# Patient Record
Sex: MALE | Race: WHITE | Employment: OTHER | URBAN - METROPOLITAN AREA
[De-identification: names, ages, dates, MRNs, and addresses within clinical notes are randomized per-mention and may not be internally consistent; named-entity substitution may affect disease eponyms.]

---

## 2017-02-26 ENCOUNTER — HOSPITAL ENCOUNTER (OUTPATIENT)
Dept: SLEEP CENTER | Age: 42
Discharge: HOME | End: 2017-02-26
Payer: MEDICAID

## 2017-02-26 PROCEDURE — 95810 POLYSOM 6/> YRS 4/> PARAM: CPT

## 2017-03-19 ENCOUNTER — HOSPITAL ENCOUNTER (OUTPATIENT)
Dept: SLEEP CENTER | Age: 42
Discharge: HOME | End: 2017-03-19
Payer: MEDICAID

## 2017-03-19 PROCEDURE — 95811 POLYSOM 6/>YRS CPAP 4/> PARM: CPT

## 2017-06-10 ENCOUNTER — HOSPITAL ENCOUNTER (EMERGENCY)
Age: 42
Discharge: HOME | End: 2017-06-10
Attending: EMERGENCY MEDICINE
Payer: MEDICAID

## 2017-06-10 ENCOUNTER — APPOINTMENT (OUTPATIENT)
Dept: GENERAL RADIOLOGY | Age: 42
End: 2017-06-10
Payer: MEDICAID

## 2017-06-10 VITALS
SYSTOLIC BLOOD PRESSURE: 113 MMHG | DIASTOLIC BLOOD PRESSURE: 78 MMHG | HEIGHT: 71 IN | BODY MASS INDEX: 33.6 KG/M2 | WEIGHT: 240 LBS | HEART RATE: 80 BPM | OXYGEN SATURATION: 96 % | TEMPERATURE: 98.3 F | RESPIRATION RATE: 18 BRPM

## 2017-06-10 DIAGNOSIS — J01.90 ACUTE NON-RECURRENT SINUSITIS, UNSPECIFIED LOCATION: ICD-10-CM

## 2017-06-10 DIAGNOSIS — J20.9 ACUTE BRONCHITIS, UNSPECIFIED ORGANISM: Primary | ICD-10-CM

## 2017-06-10 PROCEDURE — 94640 AIRWAY INHALATION TREATMENT: CPT

## 2017-06-10 PROCEDURE — 6360000002 HC RX W HCPCS: Performed by: EMERGENCY MEDICINE

## 2017-06-10 PROCEDURE — 71020 XR CHEST STANDARD TWO VW: CPT

## 2017-06-10 PROCEDURE — 99283 EMERGENCY DEPT VISIT LOW MDM: CPT

## 2017-06-10 PROCEDURE — 96374 THER/PROPH/DIAG INJ IV PUSH: CPT

## 2017-06-10 RX ORDER — METHYLPREDNISOLONE 4 MG/1
TABLET ORAL
Qty: 1 KIT | Refills: 0 | Status: SHIPPED | OUTPATIENT
Start: 2017-06-10 | End: 2017-06-16

## 2017-06-10 RX ORDER — IPRATROPIUM BROMIDE AND ALBUTEROL SULFATE 2.5; .5 MG/3ML; MG/3ML
1 SOLUTION RESPIRATORY (INHALATION)
Status: DISCONTINUED | OUTPATIENT
Start: 2017-06-11 | End: 2017-06-11 | Stop reason: HOSPADM

## 2017-06-10 RX ORDER — ALBUTEROL SULFATE 2.5 MG/3ML
2.5 SOLUTION RESPIRATORY (INHALATION) ONCE
Status: DISCONTINUED | OUTPATIENT
Start: 2017-06-10 | End: 2017-06-10

## 2017-06-10 RX ORDER — METHYLPREDNISOLONE SODIUM SUCCINATE 125 MG/2ML
125 INJECTION, POWDER, LYOPHILIZED, FOR SOLUTION INTRAMUSCULAR; INTRAVENOUS ONCE
Status: COMPLETED | OUTPATIENT
Start: 2017-06-10 | End: 2017-06-10

## 2017-06-10 RX ORDER — LEVOFLOXACIN 750 MG/1
750 TABLET ORAL DAILY
Qty: 7 TABLET | Refills: 0 | Status: SHIPPED | OUTPATIENT
Start: 2017-06-10 | End: 2017-06-17

## 2017-06-10 RX ORDER — GUAIFENESIN AND PSEUDOEPHEDRINE HCL 1200; 120 MG/1; MG/1
1 TABLET, EXTENDED RELEASE ORAL 2 TIMES DAILY
Qty: 20 TABLET | Refills: 0 | Status: SHIPPED | OUTPATIENT
Start: 2017-06-10 | End: 2019-11-25

## 2017-06-10 RX ADMIN — ALBUTEROL SULFATE 2.5 MG: 2.5 SOLUTION RESPIRATORY (INHALATION) at 21:55

## 2017-06-10 RX ADMIN — METHYLPREDNISOLONE SODIUM SUCCINATE 125 MG: 125 INJECTION, POWDER, FOR SOLUTION INTRAMUSCULAR; INTRAVENOUS at 21:54

## 2017-06-10 ASSESSMENT — PAIN DESCRIPTION - FREQUENCY: FREQUENCY: CONTINUOUS

## 2017-06-10 ASSESSMENT — ENCOUNTER SYMPTOMS
RHINORRHEA: 0
CONSTIPATION: 0
APNEA: 0
SHORTNESS OF BREATH: 0
ABDOMINAL DISTENTION: 0
ABDOMINAL PAIN: 0
NAUSEA: 0
PHOTOPHOBIA: 0
DIARRHEA: 0
EYE PAIN: 0
COLOR CHANGE: 0
SORE THROAT: 0
VOMITING: 0
BACK PAIN: 0
COUGH: 1
WHEEZING: 0
SINUS PRESSURE: 1

## 2017-06-10 ASSESSMENT — PAIN SCALES - GENERAL: PAINLEVEL_OUTOF10: 7

## 2017-06-10 ASSESSMENT — PAIN DESCRIPTION - ORIENTATION: ORIENTATION: MID

## 2017-06-10 ASSESSMENT — PAIN DESCRIPTION - DESCRIPTORS: DESCRIPTORS: DULL;THROBBING

## 2017-06-10 ASSESSMENT — PAIN DESCRIPTION - DIRECTION: RADIATING_TOWARDS: THROAT

## 2017-06-10 ASSESSMENT — PAIN DESCRIPTION - LOCATION: LOCATION: CHEST

## 2017-06-10 ASSESSMENT — PAIN DESCRIPTION - PAIN TYPE: TYPE: ACUTE PAIN

## 2017-07-06 ENCOUNTER — APPOINTMENT (OUTPATIENT)
Dept: CT IMAGING | Age: 42
End: 2017-07-06
Payer: MEDICAID

## 2017-07-06 ENCOUNTER — HOSPITAL ENCOUNTER (EMERGENCY)
Age: 42
Discharge: HOME | End: 2017-07-06
Attending: EMERGENCY MEDICINE
Payer: MEDICAID

## 2017-07-06 VITALS
OXYGEN SATURATION: 96 % | DIASTOLIC BLOOD PRESSURE: 70 MMHG | HEIGHT: 71 IN | SYSTOLIC BLOOD PRESSURE: 111 MMHG | RESPIRATION RATE: 16 BRPM | HEART RATE: 57 BPM | TEMPERATURE: 97.7 F

## 2017-07-06 DIAGNOSIS — F12.90 CANNABINOID HYPEREMESIS SYNDROME: ICD-10-CM

## 2017-07-06 DIAGNOSIS — R10.84 GENERALIZED ABDOMINAL PAIN: Primary | ICD-10-CM

## 2017-07-06 DIAGNOSIS — R11.2 CANNABINOID HYPEREMESIS SYNDROME: ICD-10-CM

## 2017-07-06 LAB
ALBUMIN SERPL-MCNC: 5.1 G/DL (ref 3.9–4.9)
ALP BLD-CCNC: 138 U/L (ref 35–104)
ALT SERPL-CCNC: 73 U/L (ref 0–41)
ANION GAP SERPL CALCULATED.3IONS-SCNC: 30 MEQ/L (ref 7–13)
AST SERPL-CCNC: 46 U/L (ref 0–40)
BASOPHILS ABSOLUTE: 0.1 K/UL (ref 0–0.2)
BASOPHILS RELATIVE PERCENT: 0.8 %
BILIRUB SERPL-MCNC: 1.2 MG/DL (ref 0–1.2)
BUN BLDV-MCNC: 20 MG/DL (ref 6–20)
CALCIUM SERPL-MCNC: 10.6 MG/DL (ref 8.6–10.2)
CHLORIDE BLD-SCNC: 99 MEQ/L (ref 98–107)
CO2: 14 MEQ/L (ref 22–29)
CREAT SERPL-MCNC: 1.06 MG/DL (ref 0.7–1.2)
EOSINOPHILS ABSOLUTE: 0.2 K/UL (ref 0–0.7)
EOSINOPHILS RELATIVE PERCENT: 1.2 %
GFR AFRICAN AMERICAN: >60
GFR NON-AFRICAN AMERICAN: >60
GLOBULIN: 2.9 G/DL (ref 2.3–3.5)
GLUCOSE BLD-MCNC: 177 MG/DL (ref 74–109)
HCT VFR BLD CALC: 45.7 % (ref 42–52)
HEMOGLOBIN: 16.1 G/DL (ref 14–18)
LACTIC ACID: 4 MMOL/L (ref 0.5–2.2)
LIPASE: 33 U/L (ref 13–60)
LYMPHOCYTES ABSOLUTE: 2.2 K/UL (ref 1–4.8)
LYMPHOCYTES RELATIVE PERCENT: 18 %
MCH RBC QN AUTO: 31.2 PG (ref 27–31.3)
MCHC RBC AUTO-ENTMCNC: 35.3 % (ref 33–37)
MCV RBC AUTO: 88.4 FL (ref 80–100)
MONOCYTES ABSOLUTE: 0.7 K/UL (ref 0.2–0.8)
MONOCYTES RELATIVE PERCENT: 5.7 %
NEUTROPHILS ABSOLUTE: 9.1 K/UL (ref 1.4–6.5)
NEUTROPHILS RELATIVE PERCENT: 74.3 %
PDW BLD-RTO: 13.5 % (ref 11.5–14.5)
PLATELET # BLD: 359 K/UL (ref 130–400)
POTASSIUM SERPL-SCNC: 3.8 MEQ/L (ref 3.5–5.1)
RBC # BLD: 5.17 M/UL (ref 4.7–6.1)
SODIUM BLD-SCNC: 143 MEQ/L (ref 132–144)
TOTAL PROTEIN: 8 G/DL (ref 6.4–8.1)
WBC # BLD: 12.3 K/UL (ref 4.8–10.8)

## 2017-07-06 PROCEDURE — 2580000003 HC RX 258: Performed by: EMERGENCY MEDICINE

## 2017-07-06 PROCEDURE — 96374 THER/PROPH/DIAG INJ IV PUSH: CPT

## 2017-07-06 PROCEDURE — 96375 TX/PRO/DX INJ NEW DRUG ADDON: CPT

## 2017-07-06 PROCEDURE — 36415 COLL VENOUS BLD VENIPUNCTURE: CPT

## 2017-07-06 PROCEDURE — 99284 EMERGENCY DEPT VISIT MOD MDM: CPT

## 2017-07-06 PROCEDURE — 83605 ASSAY OF LACTIC ACID: CPT

## 2017-07-06 PROCEDURE — 85025 COMPLETE CBC W/AUTO DIFF WBC: CPT

## 2017-07-06 PROCEDURE — 74176 CT ABD & PELVIS W/O CONTRAST: CPT

## 2017-07-06 PROCEDURE — 83690 ASSAY OF LIPASE: CPT

## 2017-07-06 PROCEDURE — 6360000002 HC RX W HCPCS: Performed by: EMERGENCY MEDICINE

## 2017-07-06 PROCEDURE — 80053 COMPREHEN METABOLIC PANEL: CPT

## 2017-07-06 RX ORDER — MORPHINE SULFATE 4 MG/ML
4 INJECTION, SOLUTION INTRAMUSCULAR; INTRAVENOUS ONCE
Status: COMPLETED | OUTPATIENT
Start: 2017-07-06 | End: 2017-07-06

## 2017-07-06 RX ORDER — 0.9 % SODIUM CHLORIDE 0.9 %
1000 INTRAVENOUS SOLUTION INTRAVENOUS ONCE
Status: COMPLETED | OUTPATIENT
Start: 2017-07-06 | End: 2017-07-06

## 2017-07-06 RX ORDER — SODIUM CHLORIDE 9 MG/ML
INJECTION, SOLUTION INTRAVENOUS CONTINUOUS
Status: DISCONTINUED | OUTPATIENT
Start: 2017-07-06 | End: 2017-07-06 | Stop reason: HOSPADM

## 2017-07-06 RX ORDER — ONDANSETRON 2 MG/ML
4 INJECTION INTRAMUSCULAR; INTRAVENOUS ONCE
Status: COMPLETED | OUTPATIENT
Start: 2017-07-06 | End: 2017-07-06

## 2017-07-06 RX ORDER — MORPHINE SULFATE 4 MG/ML
4 INJECTION, SOLUTION INTRAMUSCULAR; INTRAVENOUS
Status: DISCONTINUED | OUTPATIENT
Start: 2017-07-06 | End: 2017-07-06 | Stop reason: HOSPADM

## 2017-07-06 RX ADMIN — SODIUM CHLORIDE 1000 ML: 9 INJECTION, SOLUTION INTRAVENOUS at 14:21

## 2017-07-06 RX ADMIN — MORPHINE SULFATE 4 MG: 4 INJECTION, SOLUTION INTRAMUSCULAR; INTRAVENOUS at 14:21

## 2017-07-06 RX ADMIN — ONDANSETRON 4 MG: 2 INJECTION INTRAMUSCULAR; INTRAVENOUS at 14:21

## 2017-07-06 RX ADMIN — MORPHINE SULFATE 4 MG: 4 INJECTION, SOLUTION INTRAMUSCULAR; INTRAVENOUS at 14:38

## 2017-07-06 ASSESSMENT — ENCOUNTER SYMPTOMS
RHINORRHEA: 0
CHEST TIGHTNESS: 0
DIARRHEA: 0
BLOOD IN STOOL: 0
ABDOMINAL PAIN: 1
VOMITING: 1
NAUSEA: 1
BACK PAIN: 0
TROUBLE SWALLOWING: 0
COUGH: 0
WHEEZING: 0
EYE PAIN: 0
SHORTNESS OF BREATH: 0

## 2017-07-06 ASSESSMENT — PAIN SCALES - GENERAL
PAINLEVEL_OUTOF10: 10
PAINLEVEL_OUTOF10: 7
PAINLEVEL_OUTOF10: 10

## 2017-07-06 ASSESSMENT — PAIN DESCRIPTION - PAIN TYPE: TYPE: ACUTE PAIN

## 2017-07-06 ASSESSMENT — PAIN DESCRIPTION - LOCATION: LOCATION: ABDOMEN

## 2018-01-18 ENCOUNTER — HOSPITAL ENCOUNTER (EMERGENCY)
Age: 43
Discharge: HOME | End: 2018-01-18
Attending: EMERGENCY MEDICINE
Payer: MEDICAID

## 2018-01-18 VITALS
HEART RATE: 72 BPM | SYSTOLIC BLOOD PRESSURE: 138 MMHG | TEMPERATURE: 97.8 F | DIASTOLIC BLOOD PRESSURE: 82 MMHG | RESPIRATION RATE: 17 BRPM | OXYGEN SATURATION: 94 % | WEIGHT: 235 LBS | BODY MASS INDEX: 32.9 KG/M2 | HEIGHT: 71 IN

## 2018-01-18 DIAGNOSIS — S16.1XXA ACUTE STRAIN OF NECK MUSCLE, INITIAL ENCOUNTER: Primary | ICD-10-CM

## 2018-01-18 DIAGNOSIS — G44.209 TENSION HEADACHE: ICD-10-CM

## 2018-01-18 PROCEDURE — 96372 THER/PROPH/DIAG INJ SC/IM: CPT

## 2018-01-18 PROCEDURE — 6360000002 HC RX W HCPCS: Performed by: EMERGENCY MEDICINE

## 2018-01-18 PROCEDURE — 99283 EMERGENCY DEPT VISIT LOW MDM: CPT

## 2018-01-18 RX ORDER — KETOROLAC TROMETHAMINE 30 MG/ML
30 INJECTION, SOLUTION INTRAMUSCULAR; INTRAVENOUS ONCE
Status: COMPLETED | OUTPATIENT
Start: 2018-01-18 | End: 2018-01-18

## 2018-01-18 RX ORDER — NAPROXEN 500 MG/1
500 TABLET ORAL 2 TIMES DAILY
Qty: 10 TABLET | Refills: 0 | Status: SHIPPED | OUTPATIENT
Start: 2018-01-18 | End: 2019-11-25

## 2018-01-18 RX ORDER — DIAZEPAM 5 MG/1
5 TABLET ORAL 2 TIMES DAILY PRN
Qty: 10 TABLET | Refills: 0 | Status: SHIPPED | OUTPATIENT
Start: 2018-01-18 | End: 2018-01-28

## 2018-01-18 RX ADMIN — KETOROLAC TROMETHAMINE 30 MG: 30 INJECTION, SOLUTION INTRAMUSCULAR at 20:10

## 2018-01-18 ASSESSMENT — ENCOUNTER SYMPTOMS
VOMITING: 0
SHORTNESS OF BREATH: 0
COUGH: 0

## 2018-01-18 ASSESSMENT — PAIN DESCRIPTION - LOCATION
LOCATION: NECK
LOCATION: NECK;HEAD

## 2018-01-18 ASSESSMENT — PAIN SCALES - GENERAL
PAINLEVEL_OUTOF10: 1
PAINLEVEL_OUTOF10: 7
PAINLEVEL_OUTOF10: 7

## 2018-01-18 ASSESSMENT — PAIN DESCRIPTION - DESCRIPTORS
DESCRIPTORS: ACHING;BURNING;THROBBING
DESCRIPTORS: ACHING
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
## 2018-02-14 ENCOUNTER — APPOINTMENT (OUTPATIENT)
Dept: GENERAL RADIOLOGY | Age: 43
End: 2018-02-14
Payer: MEDICAID

## 2018-02-14 ENCOUNTER — HOSPITAL ENCOUNTER (EMERGENCY)
Age: 43
Discharge: HOME | End: 2018-02-14
Attending: EMERGENCY MEDICINE
Payer: MEDICAID

## 2018-02-14 VITALS
RESPIRATION RATE: 16 BRPM | SYSTOLIC BLOOD PRESSURE: 134 MMHG | BODY MASS INDEX: 32.2 KG/M2 | DIASTOLIC BLOOD PRESSURE: 74 MMHG | HEART RATE: 88 BPM | TEMPERATURE: 98.6 F | HEIGHT: 71 IN | WEIGHT: 230 LBS | OXYGEN SATURATION: 95 %

## 2018-02-14 DIAGNOSIS — R05.9 COUGH: Primary | ICD-10-CM

## 2018-02-14 DIAGNOSIS — R09.81 NASAL CONGESTION: ICD-10-CM

## 2018-02-14 DIAGNOSIS — J40 BRONCHITIS: ICD-10-CM

## 2018-02-14 LAB
RAPID INFLUENZA  B AGN: NEGATIVE
RAPID INFLUENZA A AGN: NEGATIVE

## 2018-02-14 PROCEDURE — 99283 EMERGENCY DEPT VISIT LOW MDM: CPT

## 2018-02-14 PROCEDURE — 71046 X-RAY EXAM CHEST 2 VIEWS: CPT

## 2018-02-14 PROCEDURE — 86403 PARTICLE AGGLUT ANTBDY SCRN: CPT

## 2018-02-14 RX ORDER — BENZONATATE 100 MG/1
100 CAPSULE ORAL 3 TIMES DAILY PRN
Qty: 20 CAPSULE | Refills: 0 | Status: SHIPPED | OUTPATIENT
Start: 2018-02-14 | End: 2019-11-25

## 2018-02-14 RX ORDER — AZITHROMYCIN 250 MG/1
TABLET, FILM COATED ORAL
Qty: 6 TABLET | Refills: 0 | Status: SHIPPED | OUTPATIENT
Start: 2018-02-14 | End: 2018-02-24

## 2018-02-14 ASSESSMENT — ENCOUNTER SYMPTOMS
STRIDOR: 0
FACIAL SWELLING: 0
VOICE CHANGE: 0
BACK PAIN: 0
CHOKING: 0
CONSTIPATION: 0
RHINORRHEA: 1
EYE PAIN: 0
TROUBLE SWALLOWING: 0
COUGH: 1
BLOOD IN STOOL: 0
SORE THROAT: 0
VOMITING: 0
DIARRHEA: 0
SINUS PRESSURE: 1
SINUS PAIN: 1
EYE DISCHARGE: 0
EYE REDNESS: 0
WHEEZING: 0
ABDOMINAL PAIN: 0
CHEST TIGHTNESS: 0
SHORTNESS OF BREATH: 0

## 2018-02-14 ASSESSMENT — PAIN DESCRIPTION - FREQUENCY: FREQUENCY: INTERMITTENT

## 2018-02-14 ASSESSMENT — PAIN DESCRIPTION - DESCRIPTORS: DESCRIPTORS: ACHING

## 2018-02-14 ASSESSMENT — PAIN DESCRIPTION - LOCATION: LOCATION: THROAT;CHEST

## 2018-02-14 ASSESSMENT — PAIN SCALES - GENERAL: PAINLEVEL_OUTOF10: 8

## 2018-02-14 ASSESSMENT — PAIN DESCRIPTION - PAIN TYPE: TYPE: ACUTE PAIN

## 2018-02-14 NOTE — ED PROVIDER NOTES
Neck: Neck supple. No tracheal deviation present. No thyromegaly present. Cardiovascular: Normal rate, regular rhythm and normal heart sounds. Exam reveals no gallop and no friction rub. No murmur heard. Pulmonary/Chest: Breath sounds normal. No respiratory distress. He has no wheezes. He has no rales. Abdominal: Soft. Bowel sounds are normal. He exhibits no mass. There is no rebound. Musculoskeletal: Normal range of motion. He exhibits no edema or tenderness. Neurological: He is alert and oriented to person, place, and time. No cranial nerve deficit. He exhibits normal muscle tone. Skin: Skin is warm. No rash noted. No erythema. Psychiatric: His behavior is normal. Thought content normal.       DIAGNOSTIC RESULTS     EKG: All EKG's are interpreted by the Emergency Department Physician who either signs or Co-signs this chart in the absence of a cardiologist.        RADIOLOGY:   Non-plain film images such as CT, Ultrasound and MRI are read by the radiologist. Plain radiographic images are visualized and preliminarily interpreted by the emergency physician with the below findings:        Interpretation per the Radiologist below, if available at the time of this note:    XR CHEST STANDARD (2 VW)    (Results Pending)         ED BEDSIDE ULTRASOUND:   Performed by ED Physician - none    LABS:  Labs Reviewed   RAPID INFLUENZA A/B ANTIGENS       All other labs were within normal range or not returned as of this dictation. EMERGENCY DEPARTMENT COURSE and DIFFERENTIAL DIAGNOSIS/MDM:   Vitals:    Vitals:    02/14/18 1719   BP: (!) 125/92   Pulse: 88   Resp: 16   Temp: 98.6 °F (37 °C)   SpO2: 96%   Weight: 230 lb (104.3 kg)   Height: 5' 11\" (1.803 m)           MDM    CRITICAL CARE TIME   Total Critical Care time was  minutes, excluding separately reportable procedures.   There was a high probability of clinically significant/life threatening deterioration in the patient's condition which required my urgent

## 2018-02-14 NOTE — ED TRIAGE NOTES
Pt reporting generalized body aches x 3 days. Cough that is painful in chest and throat when coughing. Low grade fever at home.

## 2019-05-15 ENCOUNTER — HOSPITAL ENCOUNTER (EMERGENCY)
Age: 44
Discharge: HOME | End: 2019-05-15
Attending: EMERGENCY MEDICINE
Payer: MEDICAID

## 2019-05-15 VITALS
SYSTOLIC BLOOD PRESSURE: 108 MMHG | HEIGHT: 71 IN | TEMPERATURE: 97.9 F | WEIGHT: 240 LBS | BODY MASS INDEX: 33.6 KG/M2 | OXYGEN SATURATION: 96 % | DIASTOLIC BLOOD PRESSURE: 74 MMHG | HEART RATE: 62 BPM | RESPIRATION RATE: 18 BRPM

## 2019-05-15 DIAGNOSIS — F41.1 ANXIETY STATE: Primary | ICD-10-CM

## 2019-05-15 LAB
ALBUMIN SERPL-MCNC: 5 G/DL (ref 3.5–4.6)
ALP BLD-CCNC: 147 U/L (ref 35–104)
ALT SERPL-CCNC: 126 U/L (ref 0–41)
ANION GAP SERPL CALCULATED.3IONS-SCNC: 17 MEQ/L (ref 9–15)
AST SERPL-CCNC: 82 U/L (ref 0–40)
BASOPHILS ABSOLUTE: 0.1 K/UL (ref 0–0.2)
BASOPHILS RELATIVE PERCENT: 0.7 %
BILIRUB SERPL-MCNC: 1.1 MG/DL (ref 0.2–0.7)
BUN BLDV-MCNC: 20 MG/DL (ref 6–20)
CALCIUM SERPL-MCNC: 10.7 MG/DL (ref 8.5–9.9)
CHLORIDE BLD-SCNC: 102 MEQ/L (ref 95–107)
CO2: 22 MEQ/L (ref 20–31)
CREAT SERPL-MCNC: 0.94 MG/DL (ref 0.7–1.2)
EOSINOPHILS ABSOLUTE: 0.3 K/UL (ref 0–0.7)
EOSINOPHILS RELATIVE PERCENT: 3.3 %
GFR AFRICAN AMERICAN: >60
GFR NON-AFRICAN AMERICAN: >60
GLOBULIN: 3.2 G/DL (ref 2.3–3.5)
GLUCOSE BLD-MCNC: 150 MG/DL (ref 70–99)
HCT VFR BLD CALC: 45.1 % (ref 42–52)
HEMOGLOBIN: 15.6 G/DL (ref 14–18)
LYMPHOCYTES ABSOLUTE: 2.2 K/UL (ref 1–4.8)
LYMPHOCYTES RELATIVE PERCENT: 26.8 %
MCH RBC QN AUTO: 30.7 PG (ref 27–31.3)
MCHC RBC AUTO-ENTMCNC: 34.7 % (ref 33–37)
MCV RBC AUTO: 88.5 FL (ref 80–100)
MONOCYTES ABSOLUTE: 0.5 K/UL (ref 0.2–0.8)
MONOCYTES RELATIVE PERCENT: 6.2 %
NEUTROPHILS ABSOLUTE: 5.3 K/UL (ref 1.4–6.5)
NEUTROPHILS RELATIVE PERCENT: 63 %
PDW BLD-RTO: 12.9 % (ref 11.5–14.5)
PLATELET # BLD: 344 K/UL (ref 130–400)
POTASSIUM SERPL-SCNC: 4.2 MEQ/L (ref 3.4–4.9)
RBC # BLD: 5.1 M/UL (ref 4.7–6.1)
SODIUM BLD-SCNC: 141 MEQ/L (ref 135–144)
TOTAL PROTEIN: 8.2 G/DL (ref 6.3–8)
WBC # BLD: 8.4 K/UL (ref 4.8–10.8)

## 2019-05-15 PROCEDURE — 85025 COMPLETE CBC W/AUTO DIFF WBC: CPT

## 2019-05-15 PROCEDURE — 6360000002 HC RX W HCPCS: Performed by: EMERGENCY MEDICINE

## 2019-05-15 PROCEDURE — 99283 EMERGENCY DEPT VISIT LOW MDM: CPT

## 2019-05-15 PROCEDURE — 96374 THER/PROPH/DIAG INJ IV PUSH: CPT

## 2019-05-15 PROCEDURE — 2580000003 HC RX 258: Performed by: EMERGENCY MEDICINE

## 2019-05-15 PROCEDURE — 36415 COLL VENOUS BLD VENIPUNCTURE: CPT

## 2019-05-15 PROCEDURE — 80053 COMPREHEN METABOLIC PANEL: CPT

## 2019-05-15 RX ORDER — LORAZEPAM 2 MG/ML
1 INJECTION INTRAMUSCULAR ONCE
Status: COMPLETED | OUTPATIENT
Start: 2019-05-15 | End: 2019-05-15

## 2019-05-15 RX ORDER — HYDROXYZINE PAMOATE 25 MG/1
50 CAPSULE ORAL 3 TIMES DAILY PRN
Qty: 30 CAPSULE | Refills: 0 | Status: SHIPPED | OUTPATIENT
Start: 2019-05-15 | End: 2022-05-12

## 2019-05-15 RX ORDER — 0.9 % SODIUM CHLORIDE 0.9 %
1000 INTRAVENOUS SOLUTION INTRAVENOUS ONCE
Status: COMPLETED | OUTPATIENT
Start: 2019-05-15 | End: 2019-05-15

## 2019-05-15 RX ADMIN — LORAZEPAM 1 MG: 2 INJECTION INTRAMUSCULAR; INTRAVENOUS at 14:21

## 2019-05-15 RX ADMIN — SODIUM CHLORIDE 1000 ML: 9 INJECTION, SOLUTION INTRAVENOUS at 14:21

## 2019-05-15 ASSESSMENT — ENCOUNTER SYMPTOMS
SINUS PRESSURE: 0
CONSTIPATION: 0
DIARRHEA: 0
VOMITING: 0
VOICE CHANGE: 0
WHEEZING: 0
COUGH: 0
CHOKING: 0
CHEST TIGHTNESS: 0
FACIAL SWELLING: 0
TROUBLE SWALLOWING: 0
EYE REDNESS: 0
SORE THROAT: 0
STRIDOR: 0
ABDOMINAL PAIN: 0
EYE DISCHARGE: 0
EYE PAIN: 0
BLOOD IN STOOL: 0
BACK PAIN: 0
SHORTNESS OF BREATH: 0

## 2019-05-15 NOTE — ED PROVIDER NOTES
[address] [place] [address] ED  eMERGENCY dEPARTMENT eNCOUnter      Pt Name: David Lui  MRN: [id number]  [place] 1975  Date of evaluation: 5/15/2019  Provider: Mary Lou Concepcion MD    [address] [place] [address]       Chief Complaint   Patient presents with    Anxiety       HISTORY OF PRESENT ILLNESS   (Location/Symptom, Timing/Onset,Context/Setting, Quality, Duration, Modifying Factors, Severity)  Note limiting factors. David Lui is a 53 y.o. male who presents to the emergency department patient with a history of axis alcohol abuse last alcohol was 3 years ago patient with history of anxiety and depression denies any suicidal homicidal ideation at this time but has some legal issues for which she is more anxious denies drinking any too much caffeine or taking any energy drink patient is smoker comes with the chief complaint unable to function for the last 2 days not able to eat or drink much because the shakiness and requesting something for calming down patient had no chest tightness no fever no chills no nausea no vomiting     HPI    NursingNotes were reviewed. REVIEW OF SYSTEMS    (2-9 systems for level 4, 10 or more for level 5)     Review of Systems   Constitutional: Positive for activity change and appetite change. Negative for fever. HENT: Negative for congestion, drooling, facial swelling, mouth sores, nosebleeds, sinus pressure, sore throat, trouble swallowing and voice change. Eyes: Negative for pain, discharge, redness and visual disturbance. Respiratory: Negative for cough, choking, chest tightness, shortness of breath, wheezing and stridor. Cardiovascular: Negative for chest pain, palpitations and leg swelling. Gastrointestinal: Negative for abdominal pain, blood in stool, constipation, diarrhea and vomiting. Endocrine: Negative for cold intolerance, polyphagia and polyuria. Genitourinary: Negative for dysuria, flank pain, frequency, genital sores and urgency.    Musculoskeletal: Negative for back pain, joint swelling, neck pain and neck stiffness. Skin: Negative for pallor and rash. Neurological: Negative for tremors, seizures, syncope, weakness, numbness and headaches. Hematological: Negative for adenopathy. Does not bruise/bleed easily. Psychiatric/Behavioral: Positive for decreased concentration. Negative for agitation, behavioral problems, hallucinations and sleep disturbance. The patient is nervous/anxious and is hyperactive. All other systems reviewed and are negative. Except as noted above the remainder of the review of systems was reviewed and negative. PAST MEDICAL HISTORY     Past Medical History:   Diagnosis Date    Depression     ETOH abuse     Sleep apnea          SURGICALHISTORY     History reviewed. No pertinent surgical history. CURRENT MEDICATIONS       Discharge Medication List as of 5/15/2019  3:05 PM      CONTINUE these medications which have NOT CHANGED    Details   benzonatate (TESSALON PERLES) 100 MG capsule Take 1 capsule by mouth 3 times daily as needed for Cough, Disp-20 capsule, R-0Print      naproxen (NAPROSYN) 500 MG tablet Take 1 tablet by mouth 2 times daily, Disp-10 tablet, R-0Print      Pseudoephedrine-guaiFENesin (MUCINEX D) 120-1200 MG TB12 Take 1 tablet by mouth 2 times daily, Disp-20 tablet, R-0Print             ALLERGIES     Patient has no known allergies. FAMILY HISTORY     History reviewed. No pertinent family history.        SOCIAL HISTORY       Social History     Socioeconomic History    Marital status:      Spouse name: None    Number of children: None    Years of education: None    Highest education level: None   Occupational History    None   Social Needs    Financial resource strain: None    Food insecurity:     Worry: None     Inability: None    Transportation needs:     Medical: None     Non-medical: None   Tobacco Use    Smoking status: Former Smoker     Types: Cigarettes     Last attempt to quit: 2010 Years since quittin.3    Smokeless tobacco: Never Used   Substance and Sexual Activity    Alcohol use: Not Currently     Comment: recovering ETOH    Drug use: No     Comment: Daily    Sexual activity: None   Lifestyle    Physical activity:     Days per week: None     Minutes per session: None    Stress: None   Relationships    Social connections:     Talks on phone: None     Gets together: None     Attends Christianity service: None     Active member of club or organization: None     Attends meetings of clubs or organizations: None     Relationship status: None    Intimate partner violence:     Fear of current or ex partner: None     Emotionally abused: None     Physically abused: None     Forced sexual activity: None   Other Topics Concern    None   Social History Narrative    None       SCREENINGS      @FLOW([id number])@      PHYSICAL EXAM    (up to 7 for level 4, 8 or more for level 5)     ED Triage Vitals [05/15/19 1400]   BP Temp Temp Source Pulse Resp SpO2 Height Weight   115/77 97.9 °F (36.6 °C) Oral 77 18 96 % 5' 11\" (1.803 m) 240 lb (108.9 kg)       Physical Exam   Constitutional: He is oriented to person, place, and time. He appears well-developed and well-nourished. Active alert cooperative patient but very anxious unable to stay still nontoxic afebrile   HENT:   Head: Normocephalic and atraumatic. Right Ear: External ear normal.   Left Ear: External ear normal.   Nose: Nose normal.   Mouth/Throat: Oropharynx is clear and moist.   Eyes: Pupils are equal, round, and reactive to light. Neck: Neck supple. Cardiovascular: Normal rate and normal heart sounds. Exam reveals no gallop. No murmur heard. Pulmonary/Chest: Breath sounds normal. No respiratory distress. He has no wheezes. Abdominal: Soft. Bowel sounds are normal. He exhibits no mass. There is no rebound. Musculoskeletal: Normal range of motion. He exhibits no edema or tenderness.    Neurological: He is alert and oriented to person, place, and time. No cranial nerve deficit. He exhibits normal muscle tone. Skin: Skin is warm. No rash noted. No erythema. Psychiatric: His behavior is normal. Thought content normal.   Patient does have good eye contact but patient is very anxious and slight hyperventilation unable to stay still   Vitals reviewed. DIAGNOSTIC RESULTS     EKG: All EKG's are interpreted by the Emergency Department Physician who either signs or Co-signsthis chart in the absence of a cardiologist.        RADIOLOGY:   Bobby Settle such as CT, Ultrasound and MRI are read by the radiologist. Barb Alarcon radiographic images are visualized and preliminarily interpreted by the emergency physician with the below findings:        Interpretation per the Radiologist below, if available at the time ofthis note:    No orders to display         ED BEDSIDE ULTRASOUND:   Performed by ED Physician - none    LABS:  Labs Reviewed   COMPREHENSIVE METABOLIC PANEL - Abnormal; Notable for the following components:       Result Value    Anion Gap 17 (*)     Glucose 150 (*)     Calcium 10.7 (*)     Total Protein 8.2 (*)     Alb 5.0 (*)     Total Bilirubin 1.1 (*)     Alkaline Phosphatase 147 (*)      (*)     AST 82 (*)     All other components within normal limits   CBC WITH AUTO DIFFERENTIAL       All other labs were within normal range or not returned as of this dictation.     EMERGENCY DEPARTMENT COURSE and DIFFERENTIAL DIAGNOSIS/MDM:   Vitals:    Vitals:    05/15/19 1400 05/15/19 1504   BP: 115/77 108/74   Pulse: 77 62   Resp: 18 18   Temp: 97.9 °F (36.6 °C)    TempSrc: Oral    SpO2: 96% 96%   Weight: 240 lb (108.9 kg)    Height: 5' 11\" (1.803 m)            MDM  Number of Diagnoses or Management Options  Anxiety state:   Diagnosis management comments: Patient is increase in anxiety attack for the last 2 days time unable to function very well not eating very well for the last 2 days patient is given hydration as well as patient given Ativan and resting comfortably at this time patient is going home with the Vistaril will be nonirritating for him as patient requested no narcotic for him at this time because of his history of ex-alcohol abuse but denies any alcohol at this time       Amount and/or Complexity of Data Reviewed  Clinical lab tests: ordered and reviewed        CRITICAL CARE TIME   Total Critical Care time was  minutes, excluding separately reportableprocedures. There was a high probability of clinicallysignificant/life threatening deterioration in the patient's condition which required my urgent intervention. CONSULTS:  None    PROCEDURES:  Unless otherwise noted below, none     Procedures    FINAL IMPRESSION      1.  Anxiety state          DISPOSITION/PLAN   DISPOSITION        PATIENT REFERRED TO:  [facility] [id number] [phone]      As needed      DISCHARGE MEDICATIONS:  Discharge Medication List as of 5/15/2019  3:05 PM      START taking these medications    Details   hydrOXYzine (VISTARIL) 25 MG capsule Take 2 capsules by mouth 3 times daily as needed for Anxiety, Disp-30 capsule, R-0Print                (Please note that portions of this note were completed with a voice recognition program.  Efforts were made to edit the dictations but occasionally words are mis-transcribed.)    Michele Kingston MD (electronically signed)  Attending Emergency Physician       Michele Kingston MD  05/15/19 0884

## 2019-05-15 NOTE — ED NOTES
Nursing Adult Assessment    General Appearance  [] Facial Expressions, extremities, & body posture are relaxed. [x] Exceptions:anxious, unable to sit still    Cognitive  [x] Alert, make eye contact when prompted. [x] Oriented to person, place, & situation. [] Exceptions:    Respiratory  [x] Unlabored breathing   [x] Speaks in clear and complete sentences   [x] Chest expansion is symmetrical with breaths   [x] Breath sounds are clear bilaterally. [] Exceptions:    Cardiovascular  [x] Regular apical heart sounds   [x] Peripheral pulses are palpable   [x] Capillary refill < 3 seconds in all extremities. [] Exceptions:    Abdomen  [x] Non-tender   [x] Non-distended   [x] Bowel sounds are present. [] Exceptions:    Skin  [x] Color appropriate for ethnicity   [x] No rash or discoloration present at the area(s) of complaint   [x] Warm and dry to touch. [] Exceptions:    Extremities  [x] Non-tender   [x] Normal range of motion   [x] Normal sensation   [x] Normal Appearance, No Edema.   [] Exceptions:      Ha Chaudhari RN  05/15/19 1429

## 2019-05-15 NOTE — ED NOTES
Patient resting in bed states he feels more relaxed. No signs or symptoms of pain or distress noted.      Carmine Duran RN  05/15/19 8873

## 2019-05-15 NOTE — ED TRIAGE NOTES
Patient complains of anxiety r/t personal legal situation. Complains of anxiety since Monday. He states he is a recovering alcoholic and does not want any narcotics. He states his anxiety was so bad today he couldn't get out of bed to take his daughter to school.

## 2019-06-08 ENCOUNTER — HOSPITAL ENCOUNTER (EMERGENCY)
Age: 44
Discharge: HOME | End: 2019-06-09
Attending: EMERGENCY MEDICINE
Payer: MEDICAID

## 2019-06-08 DIAGNOSIS — F10.20 CHRONIC ALCOHOLISM (HCC): Primary | ICD-10-CM

## 2019-06-08 PROCEDURE — 99284 EMERGENCY DEPT VISIT MOD MDM: CPT

## 2019-06-09 VITALS
DIASTOLIC BLOOD PRESSURE: 70 MMHG | BODY MASS INDEX: 31.5 KG/M2 | WEIGHT: 225 LBS | TEMPERATURE: 99.1 F | SYSTOLIC BLOOD PRESSURE: 126 MMHG | OXYGEN SATURATION: 95 % | HEART RATE: 96 BPM | HEIGHT: 71 IN | RESPIRATION RATE: 16 BRPM

## 2019-06-09 LAB
ALBUMIN SERPL-MCNC: 4.8 G/DL (ref 3.5–4.6)
ALP BLD-CCNC: 193 U/L (ref 35–104)
ALT SERPL-CCNC: 97 U/L (ref 0–41)
AMPHETAMINE SCREEN, URINE: NORMAL
ANION GAP SERPL CALCULATED.3IONS-SCNC: 26 MEQ/L (ref 9–15)
AST SERPL-CCNC: 74 U/L (ref 0–40)
BACTERIA: NORMAL /HPF
BARBITURATE SCREEN URINE: NORMAL
BASOPHILS ABSOLUTE: 0.1 K/UL (ref 0–0.2)
BASOPHILS RELATIVE PERCENT: 1 %
BENZODIAZEPINE SCREEN, URINE: NORMAL
BILIRUB SERPL-MCNC: 0.9 MG/DL (ref 0.2–0.7)
BILIRUBIN URINE: NEGATIVE
BLOOD, URINE: ABNORMAL
BUN BLDV-MCNC: 13 MG/DL (ref 6–20)
CALCIUM SERPL-MCNC: 9.5 MG/DL (ref 8.5–9.9)
CANNABINOID SCREEN URINE: NORMAL
CHLORIDE BLD-SCNC: 97 MEQ/L (ref 95–107)
CLARITY: CLEAR
CO2: 18 MEQ/L (ref 20–31)
COCAINE METABOLITE SCREEN URINE: NORMAL
COLOR: YELLOW
CREAT SERPL-MCNC: 0.84 MG/DL (ref 0.7–1.2)
EOSINOPHILS ABSOLUTE: 0.1 K/UL (ref 0–0.7)
EOSINOPHILS RELATIVE PERCENT: 0.7 %
ETHANOL PERCENT: 0.25 G/DL
ETHANOL: 284 MG/DL (ref 0–0.08)
GFR AFRICAN AMERICAN: >60
GFR NON-AFRICAN AMERICAN: >60
GLOBULIN: 3 G/DL (ref 2.3–3.5)
GLUCOSE BLD-MCNC: 175 MG/DL (ref 70–99)
GLUCOSE URINE: NEGATIVE MG/DL
HCT VFR BLD CALC: 47.1 % (ref 42–52)
HEMOGLOBIN: 16.2 G/DL (ref 14–18)
KETONES, URINE: 80 MG/DL
LEUKOCYTE ESTERASE, URINE: NEGATIVE
LYMPHOCYTES ABSOLUTE: 2.3 K/UL (ref 1–4.8)
LYMPHOCYTES RELATIVE PERCENT: 28.2 %
Lab: NORMAL
MAGNESIUM: 2.3 MG/DL (ref 1.7–2.4)
MCH RBC QN AUTO: 30.5 PG (ref 27–31.3)
MCHC RBC AUTO-ENTMCNC: 34.4 % (ref 33–37)
MCV RBC AUTO: 88.7 FL (ref 80–100)
MONOCYTES ABSOLUTE: 0.4 K/UL (ref 0.2–0.8)
MONOCYTES RELATIVE PERCENT: 5.4 %
NEUTROPHILS ABSOLUTE: 5.2 K/UL (ref 1.4–6.5)
NEUTROPHILS RELATIVE PERCENT: 64.7 %
NITRITE, URINE: NEGATIVE
OPIATE SCREEN URINE: NORMAL
PDW BLD-RTO: 13.1 % (ref 11.5–14.5)
PH UA: 5.5 (ref 5–9)
PHENCYCLIDINE SCREEN URINE: NORMAL
PLATELET # BLD: 379 K/UL (ref 130–400)
POTASSIUM SERPL-SCNC: 4.1 MEQ/L (ref 3.4–4.9)
PROTEIN UA: 100 MG/DL
RBC # BLD: 5.31 M/UL (ref 4.7–6.1)
RBC UA: NORMAL /HPF (ref 0–2)
SODIUM BLD-SCNC: 141 MEQ/L (ref 135–144)
SPECIFIC GRAVITY UA: >=1.03 (ref 1–1.03)
TOTAL PROTEIN: 7.8 G/DL (ref 6.3–8)
TRICYCLIC, URINE: NORMAL
URINE REFLEX TO CULTURE: YES
UROBILINOGEN, URINE: 0.2 E.U./DL
WBC # BLD: 8.1 K/UL (ref 4.8–10.8)
WBC UA: NORMAL /HPF (ref 0–5)

## 2019-06-09 PROCEDURE — 81001 URINALYSIS AUTO W/SCOPE: CPT

## 2019-06-09 PROCEDURE — 87086 URINE CULTURE/COLONY COUNT: CPT

## 2019-06-09 PROCEDURE — 36415 COLL VENOUS BLD VENIPUNCTURE: CPT

## 2019-06-09 PROCEDURE — 80306 DRUG TEST PRSMV INSTRMNT: CPT

## 2019-06-09 PROCEDURE — 85025 COMPLETE CBC W/AUTO DIFF WBC: CPT

## 2019-06-09 PROCEDURE — 6360000002 HC RX W HCPCS: Performed by: EMERGENCY MEDICINE

## 2019-06-09 PROCEDURE — 80053 COMPREHEN METABOLIC PANEL: CPT

## 2019-06-09 PROCEDURE — 2580000003 HC RX 258: Performed by: EMERGENCY MEDICINE

## 2019-06-09 PROCEDURE — 83735 ASSAY OF MAGNESIUM: CPT

## 2019-06-09 PROCEDURE — 96374 THER/PROPH/DIAG INJ IV PUSH: CPT

## 2019-06-09 PROCEDURE — G0480 DRUG TEST DEF 1-7 CLASSES: HCPCS

## 2019-06-09 RX ORDER — CHLORDIAZEPOXIDE HYDROCHLORIDE 25 MG/1
25 CAPSULE, GELATIN COATED ORAL 3 TIMES DAILY PRN
Qty: 12 CAPSULE | Refills: 3 | Status: SHIPPED | OUTPATIENT
Start: 2019-06-09 | End: 2019-06-13

## 2019-06-09 RX ORDER — FOLIC ACID 5 MG/ML
1 INJECTION, SOLUTION INTRAMUSCULAR; INTRAVENOUS; SUBCUTANEOUS DAILY
Status: DISCONTINUED | OUTPATIENT
Start: 2019-06-09 | End: 2019-06-09 | Stop reason: HOSPADM

## 2019-06-09 RX ORDER — CLOTRIMAZOLE 10 MG/1
10 LOZENGE ORAL; TOPICAL
Qty: 50 TABLET | Refills: 0 | Status: SHIPPED | OUTPATIENT
Start: 2019-06-09 | End: 2019-06-19

## 2019-06-09 RX ORDER — 0.9 % SODIUM CHLORIDE 0.9 %
1000 INTRAVENOUS SOLUTION INTRAVENOUS ONCE
Status: COMPLETED | OUTPATIENT
Start: 2019-06-09 | End: 2019-06-09

## 2019-06-09 RX ORDER — THIAMINE HYDROCHLORIDE 100 MG/ML
100 INJECTION, SOLUTION INTRAMUSCULAR; INTRAVENOUS DAILY
Status: DISCONTINUED | OUTPATIENT
Start: 2019-06-09 | End: 2019-06-09 | Stop reason: HOSPADM

## 2019-06-09 RX ORDER — ONDANSETRON 2 MG/ML
4 INJECTION INTRAMUSCULAR; INTRAVENOUS ONCE
Status: COMPLETED | OUTPATIENT
Start: 2019-06-09 | End: 2019-06-09

## 2019-06-09 RX ADMIN — SODIUM CHLORIDE 1000 ML: 9 INJECTION, SOLUTION INTRAVENOUS at 00:12

## 2019-06-09 RX ADMIN — ONDANSETRON 4 MG: 2 INJECTION INTRAMUSCULAR; INTRAVENOUS at 00:29

## 2019-06-09 RX ADMIN — SODIUM CHLORIDE 1000 ML: 9 INJECTION, SOLUTION INTRAVENOUS at 02:42

## 2019-06-09 ASSESSMENT — ENCOUNTER SYMPTOMS
ABDOMINAL PAIN: 0
CONSTIPATION: 0
TROUBLE SWALLOWING: 0
CHEST TIGHTNESS: 0
DIARRHEA: 0
SORE THROAT: 0
EYE REDNESS: 0
SINUS PRESSURE: 0
BACK PAIN: 0
CHOKING: 0
BLOOD IN STOOL: 0
VOMITING: 0
EYE PAIN: 0
FACIAL SWELLING: 0
EYE DISCHARGE: 0
WHEEZING: 0
COUGH: 0
VOICE CHANGE: 0
SHORTNESS OF BREATH: 0
STRIDOR: 0

## 2019-06-09 NOTE — ED PROVIDER NOTES
Neurological: Negative for tremors, seizures, syncope, weakness, numbness and headaches. Hematological: Negative for adenopathy. Does not bruise/bleed easily. Psychiatric/Behavioral: Negative for agitation, behavioral problems, hallucinations and sleep disturbance. The patient is not hyperactive. All other systems reviewed and are negative. Except as noted above the remainder of the review of systems was reviewed and negative. PAST MEDICAL HISTORY     Past Medical History:   Diagnosis Date    Depression     ETOH abuse     Sleep apnea          SURGICALHISTORY     History reviewed. No pertinent surgical history. CURRENT MEDICATIONS       Discharge Medication List as of 6/9/2019  1:40 AM      CONTINUE these medications which have NOT CHANGED    Details   hydrOXYzine (VISTARIL) 25 MG capsule Take 2 capsules by mouth 3 times daily as needed for Anxiety, Disp-30 capsule, R-0Print      benzonatate (TESSALON PERLES) 100 MG capsule Take 1 capsule by mouth 3 times daily as needed for Cough, Disp-20 capsule, R-0Print      naproxen (NAPROSYN) 500 MG tablet Take 1 tablet by mouth 2 times daily, Disp-10 tablet, R-0Print      Pseudoephedrine-guaiFENesin (MUCINEX D) 120-1200 MG TB12 Take 1 tablet by mouth 2 times daily, Disp-20 tablet, R-0Print             ALLERGIES     Patient has no known allergies. FAMILY HISTORY     History reviewed. No pertinent family history.        SOCIAL HISTORY       Social History     Socioeconomic History    Marital status:      Spouse name: None    Number of children: None    Years of education: None    Highest education level: None   Occupational History    None   Social Needs    Financial resource strain: None    Food insecurity:     Worry: None     Inability: None    Transportation needs:     Medical: None     Non-medical: None   Tobacco Use    Smoking status: Former Smoker     Types: Cigarettes     Last attempt to quit: 2010     Years since quitting: 9. 4    Smokeless tobacco: Never Used   Substance and Sexual Activity    Alcohol use: Yes     Comment: recovering ETOH    Drug use: No     Comment: Daily    Sexual activity: None   Lifestyle    Physical activity:     Days per week: None     Minutes per session: None    Stress: None   Relationships    Social connections:     Talks on phone: None     Gets together: None     Attends Sikh service: None     Active member of club or organization: None     Attends meetings of clubs or organizations: None     Relationship status: None    Intimate partner violence:     Fear of current or ex partner: None     Emotionally abused: None     Physically abused: None     Forced sexual activity: None   Other Topics Concern    None   Social History Narrative    None       SCREENINGS      @FLOW([id number])@      PHYSICAL EXAM    (up to 7 for level 4, 8 or more for level 5)     ED Triage Vitals [06/08/19 2356]   BP Temp Temp Source Pulse Resp SpO2 Height Weight   -- 99.1 °F (37.3 °C) Oral 109 14 96 % 5' 11\" (1.803 m) 225 lb (102.1 kg)       Physical Exam   Constitutional: He is oriented to person, place, and time. He appears well-developed. Alert and cooperative moving all the extremities very well   HENT:   Head: Normocephalic and atraumatic. Right Ear: External ear normal.   Left Ear: External ear normal.   Mouth/Throat: Oropharynx is clear and moist.   Eyes: Pupils are equal, round, and reactive to light. Neck: Neck supple. Cardiovascular: Normal rate and normal heart sounds. Exam reveals no gallop. No murmur heard. Pulmonary/Chest: Breath sounds normal. No respiratory distress. He has no wheezes. Abdominal: Soft. Bowel sounds are normal. He exhibits no mass. There is no rebound. Musculoskeletal: Normal range of motion. He exhibits no edema or tenderness. Neurological: He is alert and oriented to person, place, and time. No cranial nerve deficit. He exhibits normal muscle tone. Skin: Skin is warm.  No rash noted. No erythema. Psychiatric: His behavior is normal. Thought content normal.   Nursing note and vitals reviewed. DIAGNOSTIC RESULTS     EKG: All EKG's are interpreted by the Emergency Department Physician who either signs or Co-signsthis chart in the absence of a cardiologist.        RADIOLOGY:   Maebelle Sensing such as CT, Ultrasound and MRI are read by the radiologist. Xenia Miller radiographic images are visualized and preliminarily interpreted by the emergency physician with the below findings:        Interpretation per the Radiologist below, if available at the time ofthis note:    No orders to display         ED BEDSIDE ULTRASOUND:   Performed by ED Physician - none    LABS:  Labs Reviewed   COMPREHENSIVE METABOLIC PANEL - Abnormal; Notable for the following components:       Result Value    CO2 18 (*)     Anion Gap 26 (*)     Glucose 175 (*)     Alb 4.8 (*)     Total Bilirubin 0.9 (*)     Alkaline Phosphatase 193 (*)     ALT 97 (*)     AST 74 (*)     All other components within normal limits   URINE RT REFLEX TO CULTURE - Abnormal; Notable for the following components:    Protein,  (*)     All other components within normal limits   URINE CULTURE    Narrative:     ORDER#: [id number]                          ORDERED BY: Tracy Ferreira  SOURCE: Urine Clean Catch                  COLLECTED:  06/09/19 03:54  ANTIBIOTICS AT [facility].:                      RECEIVED :  06/09/19 15:55   CBC WITH AUTO DIFFERENTIAL   MAGNESIUM   URINE DRUG SCREEN, COMPREHENSIVE   ETHANOL   MICROSCOPIC URINALYSIS       All other labs were within normal range or not returned as of this dictation.     EMERGENCY DEPARTMENT COURSE and DIFFERENTIAL DIAGNOSIS/MDM:   Vitals:    Vitals:    06/08/19 2356 06/09/19 0530 06/09/19 0650   BP:  130/65 126/70   Pulse: 109 101 96   Resp: 14 16 16   Temp: 99.1 °F (37.3 °C)     TempSrc: Oral     SpO2: 96% 93% 95%   Weight: 225 lb (102.1 kg)     Height: 5' 11\" (1.803 m) MDM    CRITICAL CARE TIME   Total Critical Care time was  minutes, excluding separately reportableprocedures. There was a high probability of clinicallysignificant/life threatening deterioration in the patient's condition which required my urgent intervention. ONSULTS:  None    PROCEDURES:  Unless otherwise noted below, none     Procedures    FINAL IMPRESSION      1. Chronic alcoholism [facility])          DISPOSITION/PLAN   DISPOSITION Decision To Discharge 06/09/2019 01:34:12 AM      PATIENT REFERRED TO:  [facility]  [facility] [id number] [phone]    As needed      DISCHARGE MEDICATIONS:  Discharge Medication List as of 6/9/2019  1:40 AM      START taking these medications    Details   chlordiazePOXIDE (LIBRIUM) 25 MG capsule Take 1 capsule by mouth 3 times daily as needed for Anxiety for up to 4 days. , Disp-12 capsule, R-3Print                (Please note that portions of this note were completed with a voice recognition program.  Efforts were made to edit the dictations but occasionally words are mis-transcribed.)    Bridgette Richardson MD (electronically signed)  Attending Emergency Physician       Bridgette Richardson MD  06/09/19 Vinh Garcia MD  06/12/19 3243

## 2019-06-11 LAB — URINE CULTURE, ROUTINE: NORMAL

## 2019-08-09 DIAGNOSIS — F10.939 ALCOHOL WITHDRAWAL SYNDROME WITH COMPLICATION (HCC): Primary | ICD-10-CM

## 2019-08-09 RX ORDER — CHLORDIAZEPOXIDE HYDROCHLORIDE 25 MG/1
25 CAPSULE, GELATIN COATED ORAL DAILY
Qty: 7 CAPSULE | Refills: 0 | Status: SHIPPED | OUTPATIENT
Start: 2019-08-09 | End: 2019-08-16

## 2019-11-25 ENCOUNTER — HOSPITAL ENCOUNTER (EMERGENCY)
Age: 44
Discharge: HOME | End: 2019-11-25
Attending: EMERGENCY MEDICINE
Payer: MEDICAID

## 2019-11-25 VITALS
BODY MASS INDEX: 30.8 KG/M2 | RESPIRATION RATE: 18 BRPM | HEIGHT: 71 IN | WEIGHT: 220 LBS | TEMPERATURE: 98.3 F | SYSTOLIC BLOOD PRESSURE: 130 MMHG | OXYGEN SATURATION: 99 % | DIASTOLIC BLOOD PRESSURE: 84 MMHG | HEART RATE: 98 BPM

## 2019-11-25 DIAGNOSIS — J11.1 INFLUENZA-LIKE SYNDROME: Primary | ICD-10-CM

## 2019-11-25 PROCEDURE — 99282 EMERGENCY DEPT VISIT SF MDM: CPT

## 2019-11-25 PROCEDURE — 96372 THER/PROPH/DIAG INJ SC/IM: CPT

## 2019-11-25 PROCEDURE — 6360000002 HC RX W HCPCS: Performed by: EMERGENCY MEDICINE

## 2019-11-25 RX ORDER — ONDANSETRON 4 MG/1
4 TABLET, FILM COATED ORAL EVERY 8 HOURS PRN
Qty: 20 TABLET | Refills: 0 | Status: SHIPPED | OUTPATIENT
Start: 2019-11-25 | End: 2022-05-12

## 2019-11-25 RX ORDER — AZITHROMYCIN 250 MG/1
TABLET, FILM COATED ORAL
Qty: 1 PACKET | Refills: 0 | Status: ON HOLD | OUTPATIENT
Start: 2019-11-25 | End: 2020-02-11 | Stop reason: HOSPADM

## 2019-11-25 RX ORDER — DEXAMETHASONE SODIUM PHOSPHATE 10 MG/ML
6 INJECTION, SOLUTION INTRAMUSCULAR; INTRAVENOUS ONCE
Status: COMPLETED | OUTPATIENT
Start: 2019-11-25 | End: 2019-11-25

## 2019-11-25 RX ADMIN — DEXAMETHASONE SODIUM PHOSPHATE 6 MG: 10 INJECTION, SOLUTION INTRAMUSCULAR; INTRAVENOUS at 20:27

## 2019-11-25 ASSESSMENT — PAIN DESCRIPTION - PROGRESSION: CLINICAL_PROGRESSION: NOT CHANGED

## 2019-11-25 ASSESSMENT — PAIN DESCRIPTION - ORIENTATION
ORIENTATION: LEFT
ORIENTATION: POSTERIOR

## 2019-11-25 ASSESSMENT — PAIN DESCRIPTION - FREQUENCY
FREQUENCY: CONTINUOUS
FREQUENCY: CONTINUOUS

## 2019-11-25 ASSESSMENT — PAIN SCALES - GENERAL
PAINLEVEL_OUTOF10: 5
PAINLEVEL_OUTOF10: 6

## 2019-11-25 ASSESSMENT — PAIN DESCRIPTION - LOCATION
LOCATION: GENERALIZED
LOCATION: HEAD

## 2019-11-25 ASSESSMENT — PAIN DESCRIPTION - DESCRIPTORS: DESCRIPTORS: THROBBING

## 2019-11-25 ASSESSMENT — PAIN DESCRIPTION - PAIN TYPE
TYPE: ACUTE PAIN
TYPE: ACUTE PAIN

## 2019-11-25 ASSESSMENT — PAIN DESCRIPTION - ONSET: ONSET: ON-GOING

## 2019-11-25 ASSESSMENT — PAIN - FUNCTIONAL ASSESSMENT: PAIN_FUNCTIONAL_ASSESSMENT: 0-10

## 2020-02-10 ENCOUNTER — HOSPITAL ENCOUNTER (OUTPATIENT)
Age: 45
Setting detail: OBSERVATION
LOS: 1 days | Discharge: HOME | End: 2020-02-11
Attending: EMERGENCY MEDICINE | Admitting: INTERNAL MEDICINE
Payer: MEDICAID

## 2020-02-10 ENCOUNTER — APPOINTMENT (OUTPATIENT)
Dept: GENERAL RADIOLOGY | Age: 45
End: 2020-02-10
Payer: MEDICAID

## 2020-02-10 LAB
ALBUMIN SERPL-MCNC: 4.9 G/DL (ref 3.5–4.6)
ALP BLD-CCNC: 215 U/L (ref 35–104)
ALT SERPL-CCNC: 83 U/L (ref 0–41)
ANION GAP SERPL CALCULATED.3IONS-SCNC: 18 MEQ/L (ref 9–15)
AST SERPL-CCNC: 40 U/L (ref 0–40)
BASE EXCESS ARTERIAL: -2
BILIRUB SERPL-MCNC: 0.6 MG/DL (ref 0.2–0.7)
BILIRUBIN URINE: NEGATIVE
BLOOD, URINE: NEGATIVE
BUN BLDV-MCNC: 14 MG/DL (ref 6–20)
CALCIUM SERPL-MCNC: 10.7 MG/DL (ref 8.5–9.9)
CHLORIDE BLD-SCNC: 89 MEQ/L (ref 95–107)
CLARITY: CLEAR
CO2: 21 MEQ/L (ref 20–31)
COLOR: YELLOW
CREAT SERPL-MCNC: 0.89 MG/DL (ref 0.7–1.2)
GFR AFRICAN AMERICAN: >60
GFR NON-AFRICAN AMERICAN: >60
GLOBULIN: 2.7 G/DL (ref 2.3–3.5)
GLUCOSE BLD-MCNC: 704 MG/DL (ref 70–99)
GLUCOSE BLD-MCNC: >600 MG/DL (ref 60–115)
GLUCOSE URINE: 500 MG/DL
HCO3 ARTERIAL: 23 MMOL/L (ref 21–29)
HCT VFR BLD CALC: 42.4 % (ref 42–52)
HEMOGLOBIN: 14.7 G/DL (ref 14–18)
KETONES, URINE: NEGATIVE MG/DL
LACTATE: 2.43 MMOL/L (ref 0.4–2)
LACTIC ACID: 3 MMOL/L (ref 0.5–2.2)
LEUKOCYTE ESTERASE, URINE: NEGATIVE
LIPASE: 28 U/L (ref 12–95)
MCH RBC QN AUTO: 30.7 PG (ref 27–31.3)
MCHC RBC AUTO-ENTMCNC: 34.6 % (ref 33–37)
MCV RBC AUTO: 88.8 FL (ref 80–100)
NITRITE, URINE: NEGATIVE
O2 SAT, ARTERIAL: 97 % (ref 93–100)
PCO2 ARTERIAL: 36 MM HG (ref 35–45)
PDW BLD-RTO: 12.7 % (ref 11.5–14.5)
PERFORMED ON: ABNORMAL
PERFORMED ON: ABNORMAL
PH ARTERIAL: 7.42 (ref 7.35–7.45)
PH UA: 5.5 (ref 5–9)
PLATELET # BLD: 238 K/UL (ref 130–400)
PO2 ARTERIAL: 90 MM HG (ref 75–108)
POC SAMPLE TYPE: ABNORMAL
POTASSIUM SERPL-SCNC: 4.2 MEQ/L (ref 3.4–4.9)
PROTEIN UA: NEGATIVE MG/DL
RBC # BLD: 4.77 M/UL (ref 4.7–6.1)
SODIUM BLD-SCNC: 128 MEQ/L (ref 135–144)
SPECIFIC GRAVITY UA: 1.01 (ref 1–1.03)
TCO2 ARTERIAL: 24
TOTAL PROTEIN: 7.6 G/DL (ref 6.3–8)
URINE REFLEX TO CULTURE: ABNORMAL
UROBILINOGEN, URINE: 0.2 E.U./DL
WBC # BLD: 6 K/UL (ref 4.8–10.8)

## 2020-02-10 PROCEDURE — 96375 TX/PRO/DX INJ NEW DRUG ADDON: CPT

## 2020-02-10 PROCEDURE — 36600 WITHDRAWAL OF ARTERIAL BLOOD: CPT

## 2020-02-10 PROCEDURE — 71045 X-RAY EXAM CHEST 1 VIEW: CPT

## 2020-02-10 PROCEDURE — 36415 COLL VENOUS BLD VENIPUNCTURE: CPT

## 2020-02-10 PROCEDURE — 82803 BLOOD GASES ANY COMBINATION: CPT

## 2020-02-10 PROCEDURE — 96366 THER/PROPH/DIAG IV INF ADDON: CPT

## 2020-02-10 PROCEDURE — 2580000003 HC RX 258: Performed by: EMERGENCY MEDICINE

## 2020-02-10 PROCEDURE — 80053 COMPREHEN METABOLIC PANEL: CPT

## 2020-02-10 PROCEDURE — 81003 URINALYSIS AUTO W/O SCOPE: CPT

## 2020-02-10 PROCEDURE — 83690 ASSAY OF LIPASE: CPT

## 2020-02-10 PROCEDURE — 83605 ASSAY OF LACTIC ACID: CPT

## 2020-02-10 PROCEDURE — 82948 REAGENT STRIP/BLOOD GLUCOSE: CPT

## 2020-02-10 PROCEDURE — 85027 COMPLETE CBC AUTOMATED: CPT

## 2020-02-10 PROCEDURE — 96365 THER/PROPH/DIAG IV INF INIT: CPT

## 2020-02-10 PROCEDURE — 93005 ELECTROCARDIOGRAM TRACING: CPT

## 2020-02-10 PROCEDURE — 99285 EMERGENCY DEPT VISIT HI MDM: CPT

## 2020-02-10 PROCEDURE — 6370000000 HC RX 637 (ALT 250 FOR IP): Performed by: EMERGENCY MEDICINE

## 2020-02-10 RX ORDER — IBUPROFEN 400 MG/1
800 TABLET ORAL ONCE
Status: COMPLETED | OUTPATIENT
Start: 2020-02-10 | End: 2020-02-10

## 2020-02-10 RX ORDER — 0.9 % SODIUM CHLORIDE 0.9 %
1000 INTRAVENOUS SOLUTION INTRAVENOUS ONCE
Status: COMPLETED | OUTPATIENT
Start: 2020-02-10 | End: 2020-02-10

## 2020-02-10 RX ORDER — ONDANSETRON 2 MG/ML
4 INJECTION INTRAMUSCULAR; INTRAVENOUS ONCE
Status: DISCONTINUED | OUTPATIENT
Start: 2020-02-10 | End: 2020-02-11 | Stop reason: HOSPADM

## 2020-02-10 RX ORDER — 0.9 % SODIUM CHLORIDE 0.9 %
2500 INTRAVENOUS SOLUTION INTRAVENOUS ONCE
Status: COMPLETED | OUTPATIENT
Start: 2020-02-10 | End: 2020-02-11

## 2020-02-10 RX ORDER — SODIUM CHLORIDE 0.9 % (FLUSH) 0.9 %
3 SYRINGE (ML) INJECTION EVERY 8 HOURS
Status: DISCONTINUED | OUTPATIENT
Start: 2020-02-10 | End: 2020-02-11 | Stop reason: HOSPADM

## 2020-02-10 RX ORDER — SODIUM CHLORIDE 9 MG/ML
INJECTION, SOLUTION INTRAVENOUS
Status: DISCONTINUED
Start: 2020-02-10 | End: 2020-02-11 | Stop reason: HOSPADM

## 2020-02-10 RX ADMIN — SODIUM CHLORIDE 10 UNITS: 9 INJECTION, SOLUTION INTRAVENOUS at 22:57

## 2020-02-10 RX ADMIN — SODIUM CHLORIDE 1000 ML: 9 INJECTION, SOLUTION INTRAVENOUS at 22:31

## 2020-02-10 RX ADMIN — Medication 3 ML: at 22:45

## 2020-02-10 RX ADMIN — IBUPROFEN 800 MG: 400 TABLET, FILM COATED ORAL at 23:18

## 2020-02-10 RX ADMIN — SODIUM CHLORIDE 2500 ML: 9 INJECTION, SOLUTION INTRAVENOUS at 23:01

## 2020-02-10 RX ADMIN — INSULIN HUMAN 10 UNITS: 100 INJECTION, SOLUTION PARENTERAL at 22:57

## 2020-02-10 ASSESSMENT — PAIN DESCRIPTION - DESCRIPTORS: DESCRIPTORS: THROBBING

## 2020-02-10 ASSESSMENT — PAIN SCALES - GENERAL
PAINLEVEL_OUTOF10: 4
PAINLEVEL_OUTOF10: 4

## 2020-02-10 ASSESSMENT — PAIN DESCRIPTION - LOCATION: LOCATION: HEAD;FACE

## 2020-02-10 ASSESSMENT — PAIN DESCRIPTION - PAIN TYPE: TYPE: ACUTE PAIN

## 2020-02-10 ASSESSMENT — PAIN DESCRIPTION - FREQUENCY: FREQUENCY: INTERMITTENT

## 2020-02-10 NOTE — LETTER
[facility] Med Surg [facility]  [facility] [address]  [place] [address]  Phone: [phone]             February 11, 2020    Patient: Tahira Chacon   YOB: 1975   Date of Visit: 2/10/2020       To Whom It May Concern:    Tahira Chacon was seen and treated in our facility  beginning 2/10/2020 until 2/11/20  . He may return to work on February 14.       Sincerely,       Moriah Dillon MD         Signature:__________________________________

## 2020-02-11 VITALS
HEIGHT: 68 IN | WEIGHT: 234.44 LBS | BODY MASS INDEX: 35.53 KG/M2 | DIASTOLIC BLOOD PRESSURE: 98 MMHG | RESPIRATION RATE: 20 BRPM | OXYGEN SATURATION: 98 % | SYSTOLIC BLOOD PRESSURE: 150 MMHG | HEART RATE: 60 BPM | TEMPERATURE: 97.7 F

## 2020-02-11 PROBLEM — E13.10 SECONDARY DM WITH DKA (HCC): Status: ACTIVE | Noted: 2020-02-11

## 2020-02-11 LAB
ANION GAP SERPL CALCULATED.3IONS-SCNC: 14 MEQ/L (ref 9–15)
ANION GAP SERPL CALCULATED.3IONS-SCNC: 16 MEQ/L (ref 9–15)
BUN BLDV-MCNC: 13 MG/DL (ref 6–20)
BUN BLDV-MCNC: 13 MG/DL (ref 6–20)
CALCIUM SERPL-MCNC: 8.9 MG/DL (ref 8.5–9.9)
CALCIUM SERPL-MCNC: 9.1 MG/DL (ref 8.5–9.9)
CHLORIDE BLD-SCNC: 100 MEQ/L (ref 95–107)
CHLORIDE BLD-SCNC: 103 MEQ/L (ref 95–107)
CO2: 21 MEQ/L (ref 20–31)
CO2: 21 MEQ/L (ref 20–31)
CREAT SERPL-MCNC: 0.73 MG/DL (ref 0.7–1.2)
CREAT SERPL-MCNC: 0.75 MG/DL (ref 0.7–1.2)
EKG ATRIAL RATE: 58 BPM
EKG ATRIAL RATE: 88 BPM
EKG P AXIS: 10 DEGREES
EKG P AXIS: 29 DEGREES
EKG P-R INTERVAL: 168 MS
EKG P-R INTERVAL: 174 MS
EKG Q-T INTERVAL: 370 MS
EKG Q-T INTERVAL: 430 MS
EKG QRS DURATION: 104 MS
EKG QRS DURATION: 106 MS
EKG QTC CALCULATION (BAZETT): 422 MS
EKG QTC CALCULATION (BAZETT): 447 MS
EKG R AXIS: 12 DEGREES
EKG R AXIS: 12 DEGREES
EKG T AXIS: -3 DEGREES
EKG T AXIS: -5 DEGREES
EKG VENTRICULAR RATE: 58 BPM
EKG VENTRICULAR RATE: 88 BPM
GFR AFRICAN AMERICAN: >60
GFR AFRICAN AMERICAN: >60
GFR NON-AFRICAN AMERICAN: >60
GFR NON-AFRICAN AMERICAN: >60
GLUCOSE BLD-MCNC: 235 MG/DL (ref 60–115)
GLUCOSE BLD-MCNC: 249 MG/DL (ref 60–115)
GLUCOSE BLD-MCNC: 268 MG/DL (ref 70–99)
GLUCOSE BLD-MCNC: 274 MG/DL (ref 70–99)
GLUCOSE BLD-MCNC: 286 MG/DL (ref 60–115)
GLUCOSE BLD-MCNC: 393 MG/DL (ref 60–115)
HBA1C MFR BLD: 9.8 % (ref 4.8–5.9)
HCT VFR BLD CALC: 35.6 % (ref 42–52)
HEMOGLOBIN: 12.4 G/DL (ref 14–18)
MAGNESIUM: 2 MG/DL (ref 1.7–2.4)
MCH RBC QN AUTO: 30.9 PG (ref 27–31.3)
MCHC RBC AUTO-ENTMCNC: 34.7 % (ref 33–37)
MCV RBC AUTO: 89.1 FL (ref 80–100)
PDW BLD-RTO: 12.5 % (ref 11.5–14.5)
PERFORMED ON: ABNORMAL
PLATELET # BLD: 203 K/UL (ref 130–400)
POTASSIUM REFLEX MAGNESIUM: 3.9 MEQ/L (ref 3.4–4.9)
POTASSIUM SERPL-SCNC: 3.7 MEQ/L (ref 3.4–4.9)
RBC # BLD: 3.99 M/UL (ref 4.7–6.1)
SODIUM BLD-SCNC: 137 MEQ/L (ref 135–144)
SODIUM BLD-SCNC: 138 MEQ/L (ref 135–144)
TROPONIN: <0.01 NG/ML (ref 0–0.01)
TROPONIN: <0.01 NG/ML (ref 0–0.01)
WBC # BLD: 4.9 K/UL (ref 4.8–10.8)

## 2020-02-11 PROCEDURE — 85027 COMPLETE CBC AUTOMATED: CPT

## 2020-02-11 PROCEDURE — 83735 ASSAY OF MAGNESIUM: CPT

## 2020-02-11 PROCEDURE — 96372 THER/PROPH/DIAG INJ SC/IM: CPT

## 2020-02-11 PROCEDURE — 83036 HEMOGLOBIN GLYCOSYLATED A1C: CPT

## 2020-02-11 PROCEDURE — 84484 ASSAY OF TROPONIN QUANT: CPT

## 2020-02-11 PROCEDURE — 36415 COLL VENOUS BLD VENIPUNCTURE: CPT

## 2020-02-11 PROCEDURE — 93010 ELECTROCARDIOGRAM REPORT: CPT | Performed by: INTERNAL MEDICINE

## 2020-02-11 PROCEDURE — 93005 ELECTROCARDIOGRAM TRACING: CPT

## 2020-02-11 PROCEDURE — G0378 HOSPITAL OBSERVATION PER HR: HCPCS

## 2020-02-11 PROCEDURE — 80048 BASIC METABOLIC PNL TOTAL CA: CPT

## 2020-02-11 PROCEDURE — 6360000002 HC RX W HCPCS: Performed by: INTERNAL MEDICINE

## 2020-02-11 PROCEDURE — 6370000000 HC RX 637 (ALT 250 FOR IP): Performed by: INTERNAL MEDICINE

## 2020-02-11 RX ORDER — DEXTROSE MONOHYDRATE 25 G/50ML
12.5 INJECTION, SOLUTION INTRAVENOUS PRN
Status: DISCONTINUED | OUTPATIENT
Start: 2020-02-11 | End: 2020-02-11 | Stop reason: HOSPADM

## 2020-02-11 RX ORDER — SODIUM CHLORIDE 0.9 % (FLUSH) 0.9 %
10 SYRINGE (ML) INJECTION EVERY 12 HOURS SCHEDULED
Status: DISCONTINUED | OUTPATIENT
Start: 2020-02-11 | End: 2020-02-11 | Stop reason: HOSPADM

## 2020-02-11 RX ORDER — HYDROXYZINE HYDROCHLORIDE 25 MG/1
50 TABLET, FILM COATED ORAL 3 TIMES DAILY PRN
Status: DISCONTINUED | OUTPATIENT
Start: 2020-02-11 | End: 2020-02-11 | Stop reason: HOSPADM

## 2020-02-11 RX ORDER — NICOTINE POLACRILEX 4 MG
15 LOZENGE BUCCAL PRN
Status: DISCONTINUED | OUTPATIENT
Start: 2020-02-11 | End: 2020-02-11 | Stop reason: HOSPADM

## 2020-02-11 RX ORDER — GLUCOSAMINE HCL/CHONDROITIN SU 500-400 MG
1 CAPSULE ORAL DAILY
Qty: 5 EACH | Refills: 3 | Status: SHIPPED | OUTPATIENT
Start: 2020-02-11

## 2020-02-11 RX ORDER — INSULIN GLARGINE 100 [IU]/ML
25 INJECTION, SOLUTION SUBCUTANEOUS NIGHTLY
Status: DISCONTINUED | OUTPATIENT
Start: 2020-02-11 | End: 2020-02-11 | Stop reason: HOSPADM

## 2020-02-11 RX ORDER — DEXTROSE MONOHYDRATE 50 MG/ML
100 INJECTION, SOLUTION INTRAVENOUS PRN
Status: DISCONTINUED | OUTPATIENT
Start: 2020-02-11 | End: 2020-02-11 | Stop reason: HOSPADM

## 2020-02-11 RX ORDER — INSULIN GLARGINE 100 [IU]/ML
10 INJECTION, SOLUTION SUBCUTANEOUS ONCE
Status: COMPLETED | OUTPATIENT
Start: 2020-02-11 | End: 2020-02-11

## 2020-02-11 RX ORDER — SODIUM CHLORIDE 0.9 % (FLUSH) 0.9 %
10 SYRINGE (ML) INJECTION PRN
Status: DISCONTINUED | OUTPATIENT
Start: 2020-02-11 | End: 2020-02-11 | Stop reason: HOSPADM

## 2020-02-11 RX ORDER — ACETAMINOPHEN 325 MG/1
650 TABLET ORAL EVERY 6 HOURS PRN
Status: DISCONTINUED | OUTPATIENT
Start: 2020-02-11 | End: 2020-02-11 | Stop reason: HOSPADM

## 2020-02-11 RX ORDER — ONDANSETRON 2 MG/ML
4 INJECTION INTRAMUSCULAR; INTRAVENOUS EVERY 6 HOURS PRN
Status: DISCONTINUED | OUTPATIENT
Start: 2020-02-11 | End: 2020-02-11 | Stop reason: HOSPADM

## 2020-02-11 RX ORDER — SODIUM CHLORIDE AND POTASSIUM CHLORIDE .9; .15 G/100ML; G/100ML
SOLUTION INTRAVENOUS CONTINUOUS
Status: DISCONTINUED | OUTPATIENT
Start: 2020-02-11 | End: 2020-02-11 | Stop reason: HOSPADM

## 2020-02-11 RX ADMIN — INSULIN GLARGINE 25 UNITS: 100 INJECTION, SOLUTION SUBCUTANEOUS at 01:20

## 2020-02-11 RX ADMIN — POTASSIUM CHLORIDE AND SODIUM CHLORIDE: 900; 150 INJECTION, SOLUTION INTRAVENOUS at 01:21

## 2020-02-11 RX ADMIN — POTASSIUM CHLORIDE AND SODIUM CHLORIDE: 900; 150 INJECTION, SOLUTION INTRAVENOUS at 06:29

## 2020-02-11 RX ADMIN — SERTRALINE HYDROCHLORIDE 50 MG: 50 TABLET ORAL at 08:22

## 2020-02-11 RX ADMIN — INSULIN GLARGINE 10 UNITS: 100 INJECTION, SOLUTION SUBCUTANEOUS at 08:38

## 2020-02-11 RX ADMIN — ENOXAPARIN SODIUM 40 MG: 40 INJECTION SUBCUTANEOUS at 08:22

## 2020-02-11 ASSESSMENT — PAIN DESCRIPTION - LOCATION: LOCATION: HEAD

## 2020-02-11 ASSESSMENT — PAIN DESCRIPTION - PROGRESSION: CLINICAL_PROGRESSION: GRADUALLY IMPROVING

## 2020-02-11 ASSESSMENT — PAIN SCALES - GENERAL
PAINLEVEL_OUTOF10: 2
PAINLEVEL_OUTOF10: 0

## 2020-02-11 ASSESSMENT — PAIN DESCRIPTION - DESCRIPTORS: DESCRIPTORS: ACHING

## 2020-02-11 NOTE — ED PROVIDER NOTES
SURGICALHISTORY     History reviewed. No pertinent surgical history. CURRENT MEDICATIONS       Previous Medications    AZITHROMYCIN (ZITHROMAX) 250 MG TABLET    Take 2 tablets (500 mg) on Day 1, followed by 1 tablet (250 mg) once daily on Days 2 through 5. HYDROXYZINE (VISTARIL) 25 MG CAPSULE    Take 2 capsules by mouth 3 times daily as needed for Anxiety    ONDANSETRON (ZOFRAN) 4 MG TABLET    Take 1 tablet by mouth every 8 hours as needed for Nausea    SERTRALINE (ZOLOFT) 50 MG TABLET    Take 50 mg by mouth daily       ALLERGIES     Patient has no known allergies. FAMILY HISTORY     History reviewed. No pertinent family history.        SOCIAL HISTORY       Social History     Socioeconomic History    Marital status:      Spouse name: None    Number of children: None    Years of education: None    Highest education level: None   Occupational History    None   Social Needs    Financial resource strain: None    Food insecurity:     Worry: None     Inability: None    Transportation needs:     Medical: None     Non-medical: None   Tobacco Use    Smoking status: Former Smoker     Types: Cigarettes     Last attempt to quit: 2010     Years since quitting: 10.1    Smokeless tobacco: Never Used   Substance and Sexual Activity    Alcohol use: Yes     Comment: recovering ETOH last drink in August    Drug use: No     Comment: Daily    Sexual activity: None   Lifestyle    Physical activity:     Days per week: None     Minutes per session: None    Stress: None   Relationships    Social connections:     Talks on phone: None     Gets together: None     Attends Scientology service: None     Active member of club or organization: None     Attends meetings of clubs or organizations: None     Relationship status: None    Intimate partner violence:     Fear of current or ex partner: None     Emotionally abused: None     Physically abused: None     Forced sexual activity: None   Other Topics Concern    None   Social History Narrative    None       SCREENINGS    [place] Coma Scale  Eye Opening: Spontaneous  Best Verbal Response: Oriented  Best Motor Response: Obeys commands  [place] Coma Scale Score: 15 @FLOW([id number])@      PHYSICAL EXAM    (up to 7 for level 4, 8 or more for level 5)     ED Triage Vitals   BP Temp Temp Source Pulse Resp SpO2 Height Weight   02/10/20 2158 02/10/20 2158 02/10/20 2158 02/10/20 2217 02/10/20 2158 02/10/20 2158 02/10/20 2158 02/10/20 2158   (!) 148/90 98.4 °F (36.9 °C) Oral 86 18 94 % 5' 11\" (1.803 m) 235 lb (106.6 kg)       Physical Exam     CONST: -Well-developed well-nourished ;                -In no acute distress. -Vitals reviewed. EYES: -EOM intact, SHELLY:              -Sclera normal and conjunctiva: clear bilaterally. ENT: - Normal pharynx pink and moist.    NECK: -Supple (chin-to-chest). CARD: -Rate and rhythm: Regular              -Murmurs: No  RESP: -Respiratory effort and chest excursion with respirations: Normal             -Breath sounds equal bilaterally: Clear             -Wheezes: No             -Rales: No    BACK: -Flank pain: No              -Pain on palpation: No    ABD: -Distended: No           -Bruits: No           -Bowel sounds: Normal.           -Deep palpation: Non-tender           -Organomegaly palpable: No           -Abnormal masses: No    EXT: Gross appearance and use of all four extremities: Normal     SKIN: -Good turgor warm and dry. -Apparent lesions or rashes: No    NEURO: -Patient: alert                -Oriented to: person, place and time. -Appearance and judgment: appropriate.                -Cranial Nerves: Normal.                -Speech: Normal          DIAGNOSTIC RESULTS     EKG: All EKG's are interpreted by the Emergency Department Physician who either signs or Co-signsthis chart in the absence of a cardiologist.    EKG was revewed  and revealed normal sinus rhythm, rate is 70-85.  no acute ST elevations,no flipped T waves , no Q waves. RI interval is normal.QRS duration is normal. Axes are normal. There are no PVCs    RADIOLOGY:   Non-plain filmimages such as CT, Ultrasound and MRI are read by the radiologist. Plain radiographic images are visualized and preliminarily interpreted by the emergency physician with the below findings:    Patient is not in diabetic ketoacidosis, sugars somewhat corrected here.   I will contact hospitalist regarding the potential for admission versus transfer    Interpretation per the Radiologist below, if available at the time ofthis note:    XR CHEST PORTABLE    (Results Pending)         ED BEDSIDE ULTRASOUND:   Performed by ED Physician - none    LABS:  Labs Reviewed   COMPREHENSIVE METABOLIC PANEL - Abnormal; Notable for the following components:       Result Value    Sodium 128 (*)     Chloride 89 (*)     Anion Gap 18 (*)     Glucose 704 (*)     Calcium 10.7 (*)     Alb 4.9 (*)     Alkaline Phosphatase 215 (*)     ALT 83 (*)     All other components within normal limits    Narrative:     Cabotsavage Valenzuela tel. [phone],  Chemistry results called to and read back by alex fitzpatrick, 02/10/2020 22:43, by  [facility]   URINE RT REFLEX TO CULTURE - Abnormal; Notable for the following components:    Glucose, Ur 500 (*)     All other components within normal limits   LACTIC ACID, PLASMA - Abnormal; Notable for the following components:    Lactic Acid 3.0 (*)     All other components within normal limits    Narrative:     ODILON CRUZ tel. [phone],  Chemistry results called to and read back by princess fitzpatrick, 02/10/2020 23:17, by  [facility]   POCT GLUCOSE - Abnormal; Notable for the following components:    POC Glucose >600 (*)     All other components within normal limits   POCT ARTERIAL - Abnormal; Notable for the following components:    pO2, Arterial 90 (*)     O2 Sat, Arterial 97 (*)     Lactate 2.43 (*)     All other components within normal limits   POCT GLUCOSE - Abnormal; Notable for the following components:    POC Glucose 393 (*)     All other components within normal limits   CBC   LIPASE    Narrative:     CALL  [place]  [facility] tel. [phone],  Chemistry results called to and read back by princess fitzpatrick, 02/10/2020 23:17, by  [address], ARTERIAL       All other labs were within normal range or not returned as of this dictation. EMERGENCY DEPARTMENT COURSE and DIFFERENTIAL DIAGNOSIS/MDM:   Vitals:    Vitals:    02/10/20 2158 02/10/20 2217 02/10/20 2321   BP: (!) 148/90  (!) 148/88   Pulse:  86 90   Resp: 18     Temp: 98.4 °F (36.9 °C)     TempSrc: Oral     SpO2: 94% 94% 97%   Weight: 235 lb (106.6 kg)     Height: 5' 11\" (1.803 m)             MDM    CRITICAL CARE TIME   Total Critical Care time was  minutes, excluding separately reportableprocedures. There was a high probability of clinicallysignificant/life threatening deterioration in the patient's condition which required my urgent intervention. CONSULTS:  None    PROCEDURES:  Unless otherwise noted below, none     Procedures    FINAL IMPRESSION      1. New onset type 2 diabetes mellitus ([facility] [address]    2. Hyponatremia          DISPOSITION/PLAN   DISPOSITION Decision To Admit 02/11/2020 12:16:01 AM      PATIENT REFERRED TO:  No follow-up provider specified.     DISCHARGE MEDICATIONS:  New Prescriptions    No medications on file          (Please note that portions of this note were completed with a voice recognition program.  Efforts were made to edit the dictations but occasionally words are mis-transcribed.)    Callie Coronado MD (electronically signed)  Attending Emergency Physician          Callie Coronado MD  02/11/20 2873

## 2020-02-11 NOTE — ED NOTES
Report to Mike Lee, pt care transferred to [facility] for transfer to floor.       [address] [facility]  02/11/20 4739

## 2020-02-11 NOTE — ED NOTES
Registration notified pt's bright blue minivan taxi parked in emergency room parking lot and aware pt will be admitted upstairs if any concerns regarding vehicle.      [address] [facility], [address] [place] [address]  02/11/20 8902

## 2020-02-11 NOTE — PROGRESS NOTES
Nursing at bedside for morning medications and assessment. Educated patient on diabetic diet and life style changes. Suggested that he attend the diabetic classes at [address] Provided discharge instructions to patient and spouse.  Patient left unit ambulatory accompanied by spouse and nursing staff

## 2020-02-11 NOTE — ED NOTES
Pt medicated per EMAR, pt refusing dilaudid d/t medication being a narcotic. Pt requesting something not narcotic.  Physician aware     Bonita Brink RN  02/10/20 4524

## 2020-02-11 NOTE — PLAN OF CARE
Problem: Discharge Planning:  Goal: Discharged to appropriate level of care  Description  Discharged to appropriate level of care  2/11/2020 1153 by Vinod Skelton RN  Outcome: Met This Shift  2/11/2020 0054 by Marcello Madrid RN  Outcome: Ongoing     Problem: Serum Glucose Level - Abnormal:  Goal: Ability to maintain appropriate glucose levels will improve  Description  Ability to maintain appropriate glucose levels will improve  2/11/2020 1153 by Vinod Skelton RN  Outcome: Ongoing  2/11/2020 0054 by Marcello Madrid RN  Outcome: Ongoing     Problem: Discharge Planning:  Goal: Discharged to appropriate level of care  Description  Discharged to appropriate level of care  2/11/2020 1153 by Vinod Skelton RN  Outcome: Met This Shift  2/11/2020 0054 by Marcello Madrid RN  Outcome: Ongoing     Problem: Serum Glucose Level - Abnormal:  Goal: Ability to maintain appropriate glucose levels will improve  Description  Ability to maintain appropriate glucose levels will improve  2/11/2020 1153 by Vinod Skelton RN  Outcome: Ongoing  2/11/2020 0054 by Marcello Madrid RN  Outcome: Ongoing

## 2020-02-11 NOTE — H&P
Reviewed in detail and negative for DM, CAD, Cancer, CVA. Positive as follows:    History reviewed. No pertinent family history. REVIEW OF SYSTEMS:   Pertinent positives as noted in the HPI. All other systems reviewed and negative. PHYSICAL EXAM:    BP (!) 150/98   Pulse 60   Temp 97.7 °F (36.5 °C)   Resp 20   Ht 5' 8\" (1.727 m)   Wt 234 lb 7 oz (106.3 kg)   SpO2 98%   BMI 35.65 kg/m²     General appearance:  No apparent distress, appears stated age and cooperative. HEENT:  Normal cephalic, atraumatic without obvious deformity. Pupils equal, round, and reactive to light. Extra ocular muscles intact. Conjunctivae/corneas clear. Neck: Supple, with full range of motion. No jugular venous distention. Trachea midline. Respiratory:  Normal respiratory effort. Clear to auscultation, bilaterally without Rales/Wheezes/Rhonchi. Cardiovascular:  Regular rate and rhythm with normal S1/S2 without murmurs, rubs or gallops. Abdomen: Soft, non-tender, non-distended with normal bowel sounds. Musculoskeletal:  No clubbing, cyanosis or edema bilaterally. Full range of motion without deformity. Skin: Skin color, texture, turgor normal.  No rashes or lesions. Neurologic:  Neurovascularly intact without any focal sensory/motor deficits. Cranial nerves: II-XII intact, grossly non-focal.  Psychiatric:  Alert and oriented, thought content appropriate, normal insight  Capillary Refill: Brisk,< 3 seconds   Peripheral Pulses: +2 palpable, equal bilaterally       Labs:     Recent Labs     02/10/20  2214 02/11/20  0802   WBC 6.0 4.9   HGB 14.7 12.4*   HCT 42.4 35.6*    203     Recent Labs     02/10/20  2214 02/11/20  0057 02/11/20  0802   * 137 138   K 4.2 3.7 3.9   CL 89* 100 103   CO2 21 21 21   BUN 14 13 13   CREATININE 0.89 0.75 0.73   CALCIUM 10.7* 9.1 8.9     Recent Labs     02/10/20  2214   AST 40   ALT 83*   BILITOT 0.6   ALKPHOS 215*     No results for input(s): INR in the last 72 hours.   Recent Labs     02/11/20  0239 02/11/20  0802   TROPONINI <0.010 <0.010       Urinalysis:      Lab Results   Component Value Date    NITRU Negative 02/10/2020    WBCUA 0-2 06/09/2019    BACTERIA None 06/09/2019    RBCUA None seen 06/09/2019    BLOODU Negative 02/10/2020    SPECGRAV 1.010 02/10/2020    GLUCOSEU 500 02/10/2020    GLUCOSEU NEG 04/03/2012           XR CHEST PORTABLE    (Results Pending)       ASSESSMENT:    Active Hospital Problems    Diagnosis Date Noted    Secondary DM with DKA ([facility] [address] [E13.10] 02/11/2020       PLAN:        DVT Prophylaxis:   Diet: DIET CARB CONTROL; Carb Control: 4 carb choices (60 gms)/meal  Code Status: Full Code    PT/OT Eval Status:     Dispo - New onset DM with beginner of DKA- improved after insulin/hydration initiated  Dehydration due to above- resolved with IV NS  Morbid obesity with BMI 35% due to excessive calory intake- supportive care  Medically stable for DC home with insulin script, will set up appointment with endocrinology to follow as outpatient        Bel Guevara MD    Thank you No primary care provider on file. for the opportunity to be involved in this patient's care. If you have any questions or concerns please feel free to contact me.

## 2020-03-16 ENCOUNTER — OFFICE VISIT (OUTPATIENT)
Dept: ENDOCRINOLOGY | Age: 45
End: 2020-03-16
Payer: MEDICAID

## 2020-03-16 VITALS
SYSTOLIC BLOOD PRESSURE: 143 MMHG | BODY MASS INDEX: 36.37 KG/M2 | DIASTOLIC BLOOD PRESSURE: 93 MMHG | HEART RATE: 72 BPM | WEIGHT: 240 LBS | HEIGHT: 68 IN

## 2020-03-16 LAB
CHP ED QC CHECK: NORMAL
GLUCOSE BLD-MCNC: 103 MG/DL

## 2020-03-16 PROCEDURE — 2022F DILAT RTA XM EVC RTNOPTHY: CPT | Performed by: INTERNAL MEDICINE

## 2020-03-16 PROCEDURE — 82962 GLUCOSE BLOOD TEST: CPT | Performed by: INTERNAL MEDICINE

## 2020-03-16 PROCEDURE — G8484 FLU IMMUNIZE NO ADMIN: HCPCS | Performed by: INTERNAL MEDICINE

## 2020-03-16 PROCEDURE — G8417 CALC BMI ABV UP PARAM F/U: HCPCS | Performed by: INTERNAL MEDICINE

## 2020-03-16 PROCEDURE — 99243 OFF/OP CNSLTJ NEW/EST LOW 30: CPT | Performed by: INTERNAL MEDICINE

## 2020-03-16 PROCEDURE — G8427 DOCREV CUR MEDS BY ELIG CLIN: HCPCS | Performed by: INTERNAL MEDICINE

## 2020-03-16 RX ORDER — INSULIN GLARGINE 100 [IU]/ML
20 INJECTION, SOLUTION SUBCUTANEOUS NIGHTLY
Qty: 5 PEN | Refills: 3 | Status: SHIPPED | OUTPATIENT
Start: 2020-03-16 | End: 2020-04-13 | Stop reason: SDUPTHER

## 2020-03-16 NOTE — PROGRESS NOTES
Subjective:      Patient ID: Tammy Watson is a 50 y.o. male. Pt referred for uncontrolled diabetes new onset diabetes admitted at [facility] 2/11/2020 reviewed H&P from Dr. Jhoana Allen complaint of polyuria weakness dizziness and blurred vision patient started on Lantus was treated in the hospital with IV fluids insulin  Blood sugars have been lower since his discharge currently on Lantus 20 5 at night metformin and Januvia  Initial glucose was 704 on the chemistries from 210  Diabetes   He presents for his initial diabetic visit. He has type 2 diabetes mellitus. His disease course has been improving. Hypoglycemia symptoms include dizziness. Associated symptoms include blurred vision, fatigue, polydipsia and polyuria. There are no hypoglycemic complications. Symptoms are improving. Current diabetic treatment includes insulin injections (lantus metformin plus [place] and [place] ). He is currently taking insulin at bedtime. His overall blood glucose range is 180-200 mg/dl. (Lab Results       Component                Value               Date                       LABA1C                   9.8 (H)             02/11/2020                Blood sugars currently running on the low side in the morning  )     Results for Geoffry Maizes (MRN [id number]) as of 3/22/2020 12:31   Ref.  Range 2/10/2020 22:14   Sodium Latest Ref Range: 135 - 144 mEq/L 128 (L)   Potassium Latest Ref Range: 3.4 - 4.9 mEq/L 4.2   Chloride Latest Ref Range: 95 - 107 mEq/L 89 (L)   CO2 Latest Ref Range: 20 - 31 mEq/L 21   BUN Latest Ref Range: 6 - 20 mg/dL 14   Creatinine Latest Ref Range: 0.70 - 1.20 mg/dL 0.89   Anion Gap Latest Ref Range: 9 - 15 mEq/L 18 (H)   GFR Non- Latest Ref Range: >60  >60.0   GFR African American Latest Ref Range: >60  >60.0   Glucose Latest Ref Range: 70 - 99 mg/dL 704 (HH)   Calcium Latest Ref Range: 8.5 - 9.9 mg/dL 10.7 (H)   Total Protein Latest Ref Range: 6.3 - 8.0 g/dL 7.6       Results for Geoffry Maizes (MRN [id number]) as of 3/16/2020 14:31   Ref. Range 2/11/2020 00:57 2/11/2020 01:19   Sodium Latest Ref Range: 135 - 144 mEq/L 137    Potassium Latest Ref Range: 3.4 - 4.9 mEq/L 3.7    Chloride Latest Ref Range: 95 - 107 mEq/L 100    CO2 Latest Ref Range: 20 - 31 mEq/L 21    BUN Latest Ref Range: 6 - 20 mg/dL 13    Creatinine Latest Ref Range: 0.70 - 1.20 mg/dL 0.75    Anion Gap Latest Ref Range: 9 - 15 mEq/L 16 (H)    GFR Non- Latest Ref Range: >60  >60.0    GFR African American Latest Ref Range: >60  >60.0    Magnesium Latest Ref Range: 1.7 - 2.4 mg/dL 2.0    Glucose Latest Ref Range: 70 - 99 mg/dL 274 (H)    POC Glucose Latest Ref Range: 60 - 115 mg/dl  235 (H)   Calcium Latest Ref Range: 8.5 - 9.9 mg/dL 9.1    Hemoglobin A1C Latest Ref Range: 4.8 - 5.9 % 9.8 (H)      Results for Ami Gains (MRN [id number]) as of 3/16/2020 14:31   Ref. Range 2/11/2020 00:57   Hemoglobin A1C Latest Ref Range: 4.8 - 5.9 % 9.8 (H)     Results for Ami Gains (MRN [id number]) as of 3/16/2020 14:31   Ref.  Range 2/11/2020 08:02 3/16/2020 14:23   Sodium Latest Ref Range: 135 - 144 mEq/L 138    Potassium Latest Ref Range: 3.4 - 4.9 mEq/L 3.9    Chloride Latest Ref Range: 95 - 107 mEq/L 103    CO2 Latest Ref Range: 20 - 31 mEq/L 21    BUN Latest Ref Range: 6 - 20 mg/dL 13    Creatinine Latest Ref Range: 0.70 - 1.20 mg/dL 0.73    Anion Gap Latest Ref Range: 9 - 15 mEq/L 14    GFR Non- Latest Ref Range: >60  >60.0    GFR African American Latest Ref Range: >60  >60.0    Glucose Latest Units: mg/dL 268 (H) 103   Calcium Latest Ref Range: 8.5 - 9.9 mg/dL 8.9    Troponin Latest Ref Range: 0.000 - 0.010 ng/mL <0.010    WBC Latest Ref Range: 4.8 - 10.8 K/uL 4.9    RBC Latest Ref Range: 4.70 - 6.10 M/uL 3.99 (L)    Hemoglobin Quant Latest Ref Range: 14.0 - 18.0 g/dL 12.4 (L)    Hematocrit Latest Ref Range: 42.0 - 52.0 % 35.6 (L)    MCV Latest Ref Range: 80.0 - 100.0 fL 89.1    MCH Latest Ref Range: 27.0 - 31.3 pg 30.9    MCHC Latest Ref Range: 33.0 - 37.0 % 34.7    RDW Latest Ref Range: 11.5 - 14.5 % 12.5    Platelet Count Latest Ref Range: 130 - 400 K/uL 203          Patient Active Problem List   Diagnosis    Secondary DM with DKA ([facility] [address]     Social History     Socioeconomic History    Marital status:      Spouse name: Not on file    Number of children: Not on file    Years of education: Not on file    Highest education level: Not on file   Occupational History    Not on file   Social Needs    Financial resource strain: Not on file    Food insecurity     Worry: Not on file     Inability: Not on file    Transportation needs     Medical: Not on file     Non-medical: Not on file   Tobacco Use    Smoking status: Former Smoker     Types: Cigarettes     Last attempt to quit: 2010     Years since quitting: 10.2    Smokeless tobacco: Never Used   Substance and Sexual Activity    Alcohol use: Yes     Comment: recovering ETOH last drink in August    Drug use: No     Comment: Daily    Sexual activity: Not on file   Lifestyle    Physical activity     Days per week: Not on file     Minutes per session: Not on file    Stress: Not on file   Relationships    Social connections     Talks on phone: Not on file     Gets together: Not on file     Attends Hindu service: Not on file     Active member of club or organization: Not on file     Attends meetings of clubs or organizations: Not on file     Relationship status: Not on file    Intimate partner violence     Fear of current or ex partner: Not on file     Emotionally abused: Not on file     Physically abused: Not on file     Forced sexual activity: Not on file   Other Topics Concern    Not on file   Social History Narrative    Not on file       No past surgical history on file. No family history on file.     No Known Allergies      Current Outpatient Medications:     insulin glargine (LANTUS SOLOSTAR) 100 UNIT/ML injection pen, Inject 20 Units into the skin nightly,

## 2020-03-22 PROBLEM — E13.10 SECONDARY DM WITH DKA (HCC): Status: RESOLVED | Noted: 2020-02-11 | Resolved: 2020-03-22

## 2020-03-22 PROBLEM — E11.65 UNCONTROLLED TYPE 2 DIABETES MELLITUS WITH HYPERGLYCEMIA (HCC): Status: ACTIVE | Noted: 2020-03-22

## 2020-03-22 ASSESSMENT — ENCOUNTER SYMPTOMS: BLURRED VISION: 1

## 2020-03-26 ENCOUNTER — TELEPHONE (OUTPATIENT)
Dept: ENDOCRINOLOGY | Age: 45
End: 2020-03-26

## 2020-04-13 ENCOUNTER — VIRTUAL VISIT (OUTPATIENT)
Dept: ENDOCRINOLOGY | Age: 45
End: 2020-04-13
Payer: MEDICAID

## 2020-04-13 PROCEDURE — 2022F DILAT RTA XM EVC RTNOPTHY: CPT | Performed by: INTERNAL MEDICINE

## 2020-04-13 PROCEDURE — 3046F HEMOGLOBIN A1C LEVEL >9.0%: CPT | Performed by: INTERNAL MEDICINE

## 2020-04-13 PROCEDURE — G8428 CUR MEDS NOT DOCUMENT: HCPCS | Performed by: INTERNAL MEDICINE

## 2020-04-13 PROCEDURE — 99213 OFFICE O/P EST LOW 20 MIN: CPT | Performed by: INTERNAL MEDICINE

## 2020-04-13 RX ORDER — INSULIN GLARGINE 100 [IU]/ML
30 INJECTION, SOLUTION SUBCUTANEOUS NIGHTLY
Qty: 15 PEN | Refills: 3
Start: 2020-04-13 | End: 2020-04-24 | Stop reason: SDUPTHER

## 2020-04-13 NOTE — PROGRESS NOTES
MD Awilda   ondansetron (ZOFRAN) 4 MG tablet Take 1 tablet by mouth every 8 hours as needed for Nausea  Brain MD Nawaf   hydrOXYzine (VISTARIL) 25 MG capsule Take 2 capsules by mouth 3 times daily as needed for Anxiety  Patient not taking: Reported on 3/16/2020  Tammy Juarez MD       Social History     Tobacco Use    Smoking status: Former Smoker     Types: Cigarettes     Last attempt to quit: 2010     Years since quitting: 10.2    Smokeless tobacco: Never Used   Substance Use Topics    Alcohol use: Yes     Comment: recovering ETOH last drink in August    Drug use: No     Comment: Daily            PHYSICAL EXAMINATION:  [ INSTRUCTIONS:  \"[x]\" Indicates a positive item  \"[]\" Indicates a negative item  -- DELETE ALL ITEMS NOT EXAMINED]  [] Alert  [] Oriented to person/place/time    [] No apparent distress  [] Toxic appearing    [] Face flushed appearing [] Sclera clear  [] Lips are cyanotic      [] Breathing appears normal  [] Appears tachypneic      [] Rash on visible skin    [] Cranial Nerves II-XII grossly intact    [] Motor grossly intact in visible upper extremities    [] Motor grossly intact in visible lower extremities    [] Normal Mood  [] Anxious appearing    [] Depressed appearing  [] Confused appearing      [] Poor short term memory  [] Poor long term memory    [] OTHER:      Due to this being a TeleHealth encounter, evaluation of the following organ systems is limited: Vitals/Constitutional/EENT/Resp/CV/GI//MS/Neuro/Skin/Heme-Lymph-Imm. ASSESSMENT/PLAN:   Diagnosis Orders   1.  Type 2 diabetes mellitus with hyperosmolarity without coma, without long-term current use of insulin (HCC)  Basic Metabolic Panel    Hemoglobin A1C     Orders Placed This Encounter   Procedures    Basic Metabolic Panel     Standing Status:   Future     Standing Expiration Date:   4/13/2021    Hemoglobin A1C     Standing Status:   Future     Standing Expiration Date:   4/13/2021     Orders Placed This Encounter

## 2020-04-24 RX ORDER — INSULIN GLARGINE 100 [IU]/ML
30 INJECTION, SOLUTION SUBCUTANEOUS NIGHTLY
Qty: 15 PEN | Refills: 3 | Status: SHIPPED | OUTPATIENT
Start: 2020-04-24 | End: 2020-04-27 | Stop reason: SDUPTHER

## 2020-04-27 RX ORDER — PEN NEEDLE, DIABETIC 33 GX5/32"
NEEDLE, DISPOSABLE MISCELLANEOUS
Qty: 100 EACH | Refills: 3 | Status: SHIPPED | OUTPATIENT
Start: 2020-04-27

## 2020-04-27 RX ORDER — INSULIN GLARGINE 100 [IU]/ML
30 INJECTION, SOLUTION SUBCUTANEOUS NIGHTLY
Qty: 15 PEN | Refills: 3 | Status: SHIPPED | OUTPATIENT
Start: 2020-04-27 | End: 2020-08-19 | Stop reason: ALTCHOICE

## 2020-04-27 NOTE — TELEPHONE ENCOUNTER
Patient needs scripts sent to [facility] [address], he also is requesting 33 gauge needles be sent.

## 2020-08-19 ENCOUNTER — OFFICE VISIT (OUTPATIENT)
Dept: ENDOCRINOLOGY | Age: 45
End: 2020-08-19
Payer: MEDICAID

## 2020-08-19 VITALS
WEIGHT: 231 LBS | OXYGEN SATURATION: 98 % | SYSTOLIC BLOOD PRESSURE: 113 MMHG | BODY MASS INDEX: 35.12 KG/M2 | HEART RATE: 74 BPM | DIASTOLIC BLOOD PRESSURE: 80 MMHG

## 2020-08-19 LAB
CHP ED QC CHECK: NORMAL
GLUCOSE BLD-MCNC: 122 MG/DL
HBA1C MFR BLD: 5.4 %

## 2020-08-19 PROCEDURE — G8427 DOCREV CUR MEDS BY ELIG CLIN: HCPCS | Performed by: INTERNAL MEDICINE

## 2020-08-19 PROCEDURE — 83036 HEMOGLOBIN GLYCOSYLATED A1C: CPT | Performed by: INTERNAL MEDICINE

## 2020-08-19 PROCEDURE — 82962 GLUCOSE BLOOD TEST: CPT | Performed by: INTERNAL MEDICINE

## 2020-08-19 PROCEDURE — 99213 OFFICE O/P EST LOW 20 MIN: CPT | Performed by: INTERNAL MEDICINE

## 2020-08-19 PROCEDURE — 3044F HG A1C LEVEL LT 7.0%: CPT | Performed by: INTERNAL MEDICINE

## 2020-08-19 PROCEDURE — G8417 CALC BMI ABV UP PARAM F/U: HCPCS | Performed by: INTERNAL MEDICINE

## 2020-08-19 PROCEDURE — 1036F TOBACCO NON-USER: CPT | Performed by: INTERNAL MEDICINE

## 2020-08-19 PROCEDURE — 2022F DILAT RTA XM EVC RTNOPTHY: CPT | Performed by: INTERNAL MEDICINE

## 2020-08-19 RX ORDER — LANCETS 33 GAUGE
EACH MISCELLANEOUS
Qty: 100 EACH | Refills: 5 | Status: SHIPPED | OUTPATIENT
Start: 2020-08-19

## 2020-08-19 RX ORDER — BLOOD SUGAR DIAGNOSTIC
1 STRIP MISCELLANEOUS DAILY
Qty: 100 EACH | Refills: 3 | Status: SHIPPED | OUTPATIENT
Start: 2020-08-19

## 2020-08-19 RX ORDER — ERTUGLIFLOZIN 5 MG/1
TABLET, FILM COATED ORAL
Qty: 30 TABLET | Refills: 3 | Status: SHIPPED | OUTPATIENT
Start: 2020-08-19 | End: 2022-05-12

## 2020-08-19 RX ORDER — BLOOD-GLUCOSE METER
EACH MISCELLANEOUS
Qty: 1 KIT | Refills: 0 | Status: SHIPPED | OUTPATIENT
Start: 2020-08-19

## 2020-08-19 NOTE — PROGRESS NOTES
Subjective:      Patient ID: Poly Ochoa is a 50 y.o. male. 4-month follow-up on type 2 diabetes patient has been having high readings in his meter also has had a readings A1c today was 5.4 down from 9.86 months ago denies any significant changes in his weight denies any increased urination thirst currently on Lantus 30 units at night plus metformin 500 mg twice daily  Diabetes   He presents for his follow-up diabetic visit. He has type 2 diabetes mellitus. Symptoms are improving. Current diabetic treatment includes insulin injections (Lantus plus metformin). He is currently taking insulin at bedtime. His overall blood glucose range is 110-130 mg/dl. (Lab Results       Component                Value               Date                       LABA1C                   5.4                 08/19/2020            )       Results for Ashli Ferraro (MRN [id number]) as of 8/19/2020 16:40   Ref. Range 3/16/2020 14:23 8/19/2020 16:28 8/19/2020 16:36   Glucose Latest Units: mg/dL 103 122    Hemoglobin A1C Latest Units: %   5.4     Results for Ashli Ferraro (MRN [id number]) as of 8/19/2020 16:40   Ref. Range 2/11/2020 00:57 8/19/2020 16:36   Hemoglobin A1C Latest Units: % 9.8 (H) 5.4     Patient Active Problem List   Diagnosis    Uncontrolled type 2 diabetes mellitus with hyperglycemia (HCC)     No Known Allergies    Current Outpatient Medications:     metFORMIN (GLUCOPHAGE) 500 MG tablet, Take 1 tablet by mouth 2 times daily (with meals), Disp: 60 tablet, Rfl: 3    SITagliptin (JANUVIA) 100 MG tablet, Take 1 tablet by mouth daily, Disp: 90 tablet, Rfl: 3    Ertugliflozin L-PyroglutamicAc (STEGLATRO) 5 MG TABS, Once a day, Disp: 30 tablet, Rfl: 3    OneTouch Delica Lancets 90Z MISC, bid, Disp: 100 each, Rfl: 5    blood glucose test strips (ONETOUCH VERIO) strip, 1 each by In Vitro route daily As needed. , Disp: 100 each, Rfl: 3    Blood Glucose Monitoring Suppl (ONETOUCH VERIO) w/Device KIT, As directed, Disp: 1 kit, Rfl: 0    blood glucose test strips (ONE TOUCH ULTRA TEST) strip, Test TID Dx E11.65, Disp: 100 each, Rfl: 3    Insulin Pen Needle (PEN NEEDLES) 33G X 4 MM MISC, Use 1 daily with insulin, Disp: 100 each, Rfl: 3    Blood Glucose Monitoring Suppl (ACCU-CHEK COMPACT CARE KIT) KIT, 1 kit by Does not apply route daily, Disp: 1 kit, Rfl: 0    Alcohol Swabs 70 % PADS, 1 box by Does not apply route daily, Disp: 5 each, Rfl: 3    sertraline (ZOLOFT) 50 MG tablet, Take 50 mg by mouth daily, Disp: , Rfl:     ondansetron (ZOFRAN) 4 MG tablet, Take 1 tablet by mouth every 8 hours as needed for Nausea, Disp: 20 tablet, Rfl: 0    hydrOXYzine (VISTARIL) 25 MG capsule, Take 2 capsules by mouth 3 times daily as needed for Anxiety, Disp: 30 capsule, Rfl: 0      Review of Systems    Vitals:    08/19/20 1629   BP: 113/80   Pulse: 74   SpO2: 98%   Weight: 231 lb (104.8 kg)       Objective:   Physical Exam  Constitutional:       Appearance: Normal appearance. He is obese. HENT:      Head: Normocephalic and atraumatic. Neck:      Musculoskeletal: Normal range of motion and neck supple. Cardiovascular:      Rate and Rhythm: Normal rate. Musculoskeletal: Normal range of motion. Neurological:      Mental Status: He is alert. Psychiatric:         Mood and Affect: Mood normal.         Assessment:       Diagnosis Orders   1.  Type 2 diabetes mellitus with hyperosmolarity without coma, without long-term current use of insulin ([facility])  POCT Glucose    POCT glycosylated hemoglobin (Hb A1C)           Plan:      Orders Placed This Encounter   Procedures    POCT Glucose    POCT glycosylated hemoglobin (Hb A1C)     Orders Placed This Encounter   Procedures    Basic Metabolic Panel     Standing Status:   Future     Standing Expiration Date:   8/19/2021    Hemoglobin A1C     Standing Status:   Future     Standing Expiration Date:   8/19/2021    Microalbumin / Creatinine Urine Ratio     Standing Status:   Future     Standing

## 2021-02-17 ENCOUNTER — APPOINTMENT (OUTPATIENT)
Dept: GENERAL RADIOLOGY | Age: 46
End: 2021-02-17
Payer: MEDICAID

## 2021-02-17 ENCOUNTER — HOSPITAL ENCOUNTER (EMERGENCY)
Age: 46
Discharge: HOME | End: 2021-02-17
Attending: EMERGENCY MEDICINE
Payer: MEDICAID

## 2021-02-17 VITALS
TEMPERATURE: 98 F | OXYGEN SATURATION: 98 % | HEART RATE: 62 BPM | WEIGHT: 210 LBS | DIASTOLIC BLOOD PRESSURE: 78 MMHG | HEIGHT: 71 IN | BODY MASS INDEX: 29.4 KG/M2 | RESPIRATION RATE: 16 BRPM | SYSTOLIC BLOOD PRESSURE: 124 MMHG

## 2021-02-17 DIAGNOSIS — S39.012A STRAIN OF LUMBAR REGION, INITIAL ENCOUNTER: Primary | ICD-10-CM

## 2021-02-17 DIAGNOSIS — S39.012A LUMBOSACRAL STRAIN, INITIAL ENCOUNTER: ICD-10-CM

## 2021-02-17 PROCEDURE — 6360000002 HC RX W HCPCS: Performed by: EMERGENCY MEDICINE

## 2021-02-17 PROCEDURE — 96372 THER/PROPH/DIAG INJ SC/IM: CPT

## 2021-02-17 PROCEDURE — 72110 X-RAY EXAM L-2 SPINE 4/>VWS: CPT

## 2021-02-17 PROCEDURE — 99285 EMERGENCY DEPT VISIT HI MDM: CPT

## 2021-02-17 RX ORDER — KETOROLAC TROMETHAMINE 10 MG/1
10 TABLET, FILM COATED ORAL EVERY 6 HOURS PRN
Qty: 20 TABLET | Refills: 0 | Status: SHIPPED | OUTPATIENT
Start: 2021-02-17 | End: 2021-04-25 | Stop reason: SDUPTHER

## 2021-02-17 RX ORDER — KETOROLAC TROMETHAMINE 30 MG/ML
60 INJECTION, SOLUTION INTRAMUSCULAR; INTRAVENOUS ONCE
Status: COMPLETED | OUTPATIENT
Start: 2021-02-17 | End: 2021-02-17

## 2021-02-17 RX ORDER — ORPHENADRINE CITRATE 30 MG/ML
60 INJECTION INTRAMUSCULAR; INTRAVENOUS ONCE
Status: COMPLETED | OUTPATIENT
Start: 2021-02-17 | End: 2021-02-17

## 2021-02-17 RX ORDER — CHLORZOXAZONE 500 MG/1
500 TABLET ORAL 4 TIMES DAILY PRN
Qty: 20 TABLET | Refills: 0 | Status: SHIPPED | OUTPATIENT
Start: 2021-02-17 | End: 2021-02-27

## 2021-02-17 RX ORDER — CYCLOBENZAPRINE HCL 10 MG
10 TABLET ORAL 3 TIMES DAILY PRN
Qty: 30 TABLET | Refills: 0 | Status: SHIPPED | OUTPATIENT
Start: 2021-02-17 | End: 2021-02-17 | Stop reason: CLARIF

## 2021-02-17 RX ADMIN — ORPHENADRINE CITRATE 60 MG: 30 INJECTION INTRAMUSCULAR; INTRAVENOUS at 21:33

## 2021-02-17 RX ADMIN — KETOROLAC TROMETHAMINE 60 MG: 30 INJECTION, SOLUTION INTRAMUSCULAR at 21:33

## 2021-02-17 ASSESSMENT — ENCOUNTER SYMPTOMS
NAUSEA: 0
SHORTNESS OF BREATH: 0
ABDOMINAL DISTENTION: 0
DIARRHEA: 0
EYE PAIN: 0
CONSTIPATION: 0
WHEEZING: 0
SORE THROAT: 0
RHINORRHEA: 0
APNEA: 0
VOMITING: 0
SINUS PRESSURE: 0
COLOR CHANGE: 0
PHOTOPHOBIA: 0
BACK PAIN: 1
COUGH: 0
ABDOMINAL PAIN: 0

## 2021-02-17 ASSESSMENT — PAIN DESCRIPTION - LOCATION: LOCATION: BACK

## 2021-02-17 ASSESSMENT — PAIN DESCRIPTION - FREQUENCY
FREQUENCY: CONTINUOUS
FREQUENCY: CONTINUOUS

## 2021-02-17 ASSESSMENT — PAIN DESCRIPTION - PAIN TYPE
TYPE: ACUTE PAIN
TYPE: ACUTE PAIN

## 2021-02-17 ASSESSMENT — PAIN DESCRIPTION - DESCRIPTORS: DESCRIPTORS: DULL

## 2021-02-17 ASSESSMENT — PAIN DESCRIPTION - ORIENTATION: ORIENTATION: LOWER;RIGHT

## 2021-02-17 ASSESSMENT — PAIN DESCRIPTION - ONSET: ONSET: SUDDEN

## 2021-02-17 ASSESSMENT — PAIN SCALES - GENERAL: PAINLEVEL_OUTOF10: 7

## 2021-02-18 NOTE — ED NOTES
Discharge instructions given. Patient verbalizes understanding and denies any questions. Scripts x 2 sent to pharmacy. Patient ambulating with steady gait upon discharge.         Michelle Reyes RN  02/17/21 [id number]

## 2021-02-18 NOTE — ED TRIAGE NOTES
Patient to room #6 for c/o right lower back pain radiating down the right leg after playing softball 3 days ago.

## 2021-02-18 NOTE — ED PROVIDER NOTES
[address] [place] [address] ED  eMERGENCY dEPARTMENT eNCOUnter      Pt Name: Akila Geiger  MRN: [id number]  [place] 1975  Date of evaluation: 2/17/2021  Provider: Candelaria Burroughs MD    [address] [place] [address]       Chief Complaint   Patient presents with    Back Pain     Began 3 days prior After playing soft ball         HISTORY OF PRESENT ILLNESS   (Location/Symptom, Timing/Onset,Context/Setting, Quality, Duration, Modifying Factors, Severity)  Note limiting factors. Akila Geiger is a 48 y.o. male who presents to the emergency department with complaint of right low back pain after playing softball 3 days ago. Pain radiates down right leg. No extremity numbness or weakness. No bladder or bowel dysfunction. Pain is 8 in a scale of 1-10 and worse with weightbearing. Denies any other injuries. Pain is persistent. HPI    Nursing Notes were reviewed. REVIEW OF SYSTEMS    (2-9 systems for level 4, 10 or more for level 5)     Review of Systems   Constitutional: Negative. Negative for activity change, appetite change, chills, fatigue and fever. HENT: Negative for congestion, ear discharge, ear pain, hearing loss, rhinorrhea, sinus pressure and sore throat. Eyes: Negative for photophobia, pain and visual disturbance. Respiratory: Negative for apnea, cough, shortness of breath and wheezing. Cardiovascular: Negative for chest pain, palpitations and leg swelling. Gastrointestinal: Negative for abdominal distention, abdominal pain, constipation, diarrhea, nausea and vomiting. Endocrine: Negative for cold intolerance, heat intolerance and polyuria. Genitourinary: Negative for dysuria, flank pain, frequency and urgency. Musculoskeletal: Positive for arthralgias, back pain and myalgias. Negative for gait problem and neck stiffness. Skin: Negative for color change, pallor and rash. Allergic/Immunologic: Negative for food allergies and immunocompromised state.    Neurological: Negative for dizziness, tremors, syncope, weakness, light-headedness and headaches. Psychiatric/Behavioral: Negative for agitation, confusion and hallucinations. All other systems reviewed and are negative. Except as noted above the remainder of the review of systems was reviewed and negative. PAST MEDICAL HISTORY     Past Medical History:   Diagnosis Date    Depression     ETOH abuse     Sleep apnea          SURGICAL HISTORY     History reviewed. No pertinent surgical history. CURRENT MEDICATIONS       Discharge Medication List as of 2/17/2021 10:04 PM      CONTINUE these medications which have NOT CHANGED    Details   sertraline (ZOLOFT) 50 MG tablet Take 50 mg by mouth dailyHistorical Med      metFORMIN (GLUCOPHAGE) 500 MG tablet Take 1 tablet by mouth 2 times daily (with meals), Disp-60 tablet,R-3Normal      SITagliptin (JANUVIA) 100 MG tablet Take 1 tablet by mouth daily, Disp-90 tablet,R-3Normal      Ertugliflozin L-PyroglutamicAc (STEGLATRO) 5 MG TABS Once a day, Disp-30 tablet,R-3Normal      OneTouch Delica Lancets 78Z MISC bid, Disp-100 each,R-5Normal      !! blood glucose test strips (ONETOUCH VERIO) strip 1 each by In Vitro route daily As needed. , Disp-100 each,R-3Normal      Blood Glucose Monitoring Suppl ([place]) w/Device KIT As directed, Disp-1 kit,R-0Normal      !! blood glucose test strips (ONE TOUCH ULTRA TEST) strip Test TID Dx E11.65, Disp-100 each,R-3Normal      Insulin Pen Needle (PEN NEEDLES) 33G X 4 MM MISC Use 1 daily with insulin, Disp-100 each,R-3Normal      Blood Glucose Monitoring Suppl (ACCU-CHEK COMPACT CARE KIT) KIT DAILY Starting Tue 2/11/2020, Disp-1 kit, R-0, Print      Alcohol Swabs 70 % PADS DAILY Starting Tue 2/11/2020, Disp-5 each, R-3, Print      ondansetron (ZOFRAN) 4 MG tablet Take 1 tablet by mouth every 8 hours as needed for Nausea, Disp-20 tablet, R-0Print      hydrOXYzine (VISTARIL) 25 MG capsule Take 2 capsules by mouth 3 times daily as needed for Anxiety, Disp-30 capsule, R-0Print       !! - Potential duplicate medications found. Please discuss with provider. ALLERGIES     Patient has no known allergies. FAMILY HISTORY     History reviewed. No pertinent family history.        SOCIAL HISTORY       Social History     Socioeconomic History    Marital status:      Spouse name: None    Number of children: None    Years of education: None    Highest education level: None   Occupational History    None   Social Needs    Financial resource strain: None    Food insecurity     Worry: None     Inability: None    Transportation needs     Medical: None     Non-medical: None   Tobacco Use    Smoking status: Former Smoker     Types: Cigarettes     Quit date:      Years since quittin.1    Smokeless tobacco: Never Used   Substance and Sexual Activity    Alcohol use: Yes     Comment: recovering ETOH last drink in August    Drug use: No     Comment: Daily    Sexual activity: None   Lifestyle    Physical activity     Days per week: None     Minutes per session: None    Stress: None   Relationships    Social connections     Talks on phone: None     Gets together: None     Attends [employer] service: None     Active member of club or organization: None     Attends meetings of clubs or organizations: None     Relationship status: None    Intimate partner violence     Fear of current or ex partner: None     Emotionally abused: None     Physically abused: None     Forced sexual activity: None   Other Topics Concern    None   Social History Narrative    None       SCREENINGS    [place] Coma Scale  Eye Opening: Spontaneous  Best Verbal Response: Oriented  Best Motor Response: Obeys commands  Aylett Coma Scale Score: 15        PHYSICAL EXAM    (up to 7 for level 4, 8 or more for level 5)     ED Triage Vitals [21 2102]   BP Temp Temp Source Pulse Resp SpO2 Height Weight   138/88 97.9 °F (36.6 °C) Oral 65 15 97 % 5' 11\" (1.803 m) 210 lb (95.3 kg) Physical Exam  Vitals signs and nursing note reviewed. Constitutional:       General: He is not in acute distress. Appearance: Normal appearance. He is well-developed and normal weight. He is not ill-appearing, toxic-appearing or diaphoretic. HENT:      Head: Normocephalic and atraumatic. Nose: Nose normal. No congestion or rhinorrhea. Mouth/Throat:      Mouth: Mucous membranes are moist.      Pharynx: Oropharynx is clear. No oropharyngeal exudate or posterior oropharyngeal erythema. Eyes:      General: No scleral icterus. Right eye: No discharge. Left eye: No discharge. Extraocular Movements: Extraocular movements intact. Conjunctiva/sclera: Conjunctivae normal.      Pupils: Pupils are equal, round, and reactive to light. Neck:      Musculoskeletal: Normal range of motion and neck supple. No neck rigidity or muscular tenderness. Thyroid: No thyromegaly. Vascular: No carotid bruit or JVD. Trachea: No tracheal deviation. Cardiovascular:      Rate and Rhythm: Normal rate and regular rhythm. Heart sounds: Normal heart sounds. No murmur. No friction rub. No gallop. Pulmonary:      Effort: Pulmonary effort is normal. No respiratory distress. Breath sounds: Normal breath sounds. No stridor. No wheezing, rhonchi or rales. Chest:      Chest wall: No tenderness. Abdominal:      General: Bowel sounds are normal. There is no distension. Palpations: Abdomen is soft. There is no mass. Tenderness: There is no abdominal tenderness. There is no right CVA tenderness, left CVA tenderness, guarding or rebound. Hernia: No hernia is present. Musculoskeletal: Normal range of motion. General: Tenderness and signs of injury present. No swelling or deformity. Right lower leg: No edema. Left lower leg: No edema. Comments: Right paraspinal lumbar tenderness and spasms.    Lymphadenopathy:      Cervical: No cervical radiology section of CPT®: reviewed and ordered    Risk of Complications, Morbidity, and/or Mortality  Presenting problems: moderate  Diagnostic procedures: moderate  Management options: moderate    Patient Progress  Patient progress: improved      CRITICAL CARE TIME   Total Critical Care time was  minutes, excluding separately reportable procedures. There was a high probability of clinically significant/life threatening deterioration in the patient's condition which required my urgentintervention. CONSULTS:  None    PROCEDURES:  Unless otherwise noted below, none     Procedures    FINAL IMPRESSION      1. Strain of lumbar region, initial encounter    2.  Lumbosacral strain, initial encounter          DISPOSITION/PLAN   DISPOSITION Decision To Discharge 02/17/2021 10:21:11 PM      PATIENT REFERRED TO:  Keyona Oakley MD  [facility] [phone]    In 3 days        DISCHARGE MEDICATIONS:  Discharge Medication List as of 2/17/2021 10:04 PM      START taking these medications    Details   ketorolac (TORADOL) 10 MG tablet Take 1 tablet by mouth every 6 hours as needed for Pain, Disp-20 tablet, R-0Normal      chlorzoxazone (PARAFON FORTE DSC) 500 MG tablet Take 1 tablet by mouth 4 times daily as needed for Muscle spasms, Disp-20 tablet, R-0Normal                (Please note that portions of this note were completed with a voice recognitionprogram.  Efforts were made to edit the dictations but occasionally words are mis-transcribed.)    Landy Evans MD (electronically signed)  Attending Emergency Physician          Landy Evans MD  02/17/21 [address], MD  02/17/21 [id number]

## 2021-02-24 ENCOUNTER — HOSPITAL ENCOUNTER (EMERGENCY)
Age: 46
Discharge: HOME | End: 2021-02-24
Attending: EMERGENCY MEDICINE
Payer: MEDICAID

## 2021-02-24 ENCOUNTER — APPOINTMENT (OUTPATIENT)
Dept: CT IMAGING | Age: 46
End: 2021-02-24
Payer: MEDICAID

## 2021-02-24 VITALS
HEART RATE: 62 BPM | DIASTOLIC BLOOD PRESSURE: 74 MMHG | WEIGHT: 210 LBS | OXYGEN SATURATION: 98 % | BODY MASS INDEX: 29.4 KG/M2 | SYSTOLIC BLOOD PRESSURE: 124 MMHG | HEIGHT: 71 IN | TEMPERATURE: 97.5 F | RESPIRATION RATE: 20 BRPM

## 2021-02-24 DIAGNOSIS — R31.0 GROSS HEMATURIA: ICD-10-CM

## 2021-02-24 DIAGNOSIS — N30.01 ACUTE CYSTITIS WITH HEMATURIA: ICD-10-CM

## 2021-02-24 DIAGNOSIS — S39.012A BACK STRAIN, INITIAL ENCOUNTER: Primary | ICD-10-CM

## 2021-02-24 LAB
ALBUMIN SERPL-MCNC: 4.8 G/DL (ref 3.5–4.6)
ALP BLD-CCNC: 119 U/L (ref 35–104)
ALT SERPL-CCNC: 24 U/L (ref 0–41)
ANION GAP SERPL CALCULATED.3IONS-SCNC: 10 MEQ/L (ref 9–15)
AST SERPL-CCNC: 16 U/L (ref 0–40)
BACTERIA: ABNORMAL /HPF
BASOPHILS ABSOLUTE: 0 K/UL (ref 0–0.2)
BASOPHILS RELATIVE PERCENT: 0.8 %
BILIRUB SERPL-MCNC: 0.6 MG/DL (ref 0.2–0.7)
BILIRUBIN URINE: ABNORMAL
BLOOD, URINE: ABNORMAL
BUN BLDV-MCNC: 17 MG/DL (ref 6–20)
CALCIUM SERPL-MCNC: 10.1 MG/DL (ref 8.5–9.9)
CHLORIDE BLD-SCNC: 104 MEQ/L (ref 95–107)
CLARITY: ABNORMAL
CO2: 26 MEQ/L (ref 20–31)
COLOR: ABNORMAL
CREAT SERPL-MCNC: 0.95 MG/DL (ref 0.7–1.2)
EOSINOPHILS ABSOLUTE: 0.3 K/UL (ref 0–0.7)
EOSINOPHILS RELATIVE PERCENT: 5.1 %
EPITHELIAL CELLS, UA: ABNORMAL /HPF
GFR AFRICAN AMERICAN: >60
GFR NON-AFRICAN AMERICAN: >60
GLOBULIN: 2.5 G/DL (ref 2.3–3.5)
GLUCOSE BLD-MCNC: 90 MG/DL (ref 70–99)
GLUCOSE URINE: NEGATIVE MG/DL
HCT VFR BLD CALC: 39 % (ref 42–52)
HEMOGLOBIN: 13.4 G/DL (ref 14–18)
INR BLD: 1
KETONES, URINE: ABNORMAL MG/DL
LEUKOCYTE ESTERASE, URINE: NEGATIVE
LYMPHOCYTES ABSOLUTE: 1.9 K/UL (ref 1–4.8)
LYMPHOCYTES RELATIVE PERCENT: 30.9 %
MCH RBC QN AUTO: 30.6 PG (ref 27–31.3)
MCHC RBC AUTO-ENTMCNC: 34.4 % (ref 33–37)
MCV RBC AUTO: 89.1 FL (ref 80–100)
MONOCYTES ABSOLUTE: 0.4 K/UL (ref 0.2–0.8)
MONOCYTES RELATIVE PERCENT: 6.4 %
NEUTROPHILS ABSOLUTE: 3.5 K/UL (ref 1.4–6.5)
NEUTROPHILS RELATIVE PERCENT: 56.8 %
NITRITE, URINE: NEGATIVE
PDW BLD-RTO: 13.1 % (ref 11.5–14.5)
PH UA: 5.5 (ref 5–9)
PLATELET # BLD: 275 K/UL (ref 130–400)
POTASSIUM SERPL-SCNC: 4.4 MEQ/L (ref 3.4–4.9)
PROTEIN UA: 30 MG/DL
PROTHROMBIN TIME: 13.3 SEC (ref 12.3–14.9)
RBC # BLD: 4.38 M/UL (ref 4.7–6.1)
RBC UA: >100 /HPF (ref 0–2)
SODIUM BLD-SCNC: 140 MEQ/L (ref 135–144)
SPECIFIC GRAVITY UA: >=1.03 (ref 1–1.03)
TOTAL PROTEIN: 7.3 G/DL (ref 6.3–8)
URINE REFLEX TO CULTURE: ABNORMAL
UROBILINOGEN, URINE: 0.2 E.U./DL
WBC # BLD: 6.2 K/UL (ref 4.8–10.8)
WBC UA: ABNORMAL /HPF (ref 0–5)

## 2021-02-24 PROCEDURE — 85025 COMPLETE CBC W/AUTO DIFF WBC: CPT

## 2021-02-24 PROCEDURE — 96374 THER/PROPH/DIAG INJ IV PUSH: CPT

## 2021-02-24 PROCEDURE — 6370000000 HC RX 637 (ALT 250 FOR IP): Performed by: EMERGENCY MEDICINE

## 2021-02-24 PROCEDURE — 6360000002 HC RX W HCPCS: Performed by: EMERGENCY MEDICINE

## 2021-02-24 PROCEDURE — 74176 CT ABD & PELVIS W/O CONTRAST: CPT

## 2021-02-24 PROCEDURE — 99285 EMERGENCY DEPT VISIT HI MDM: CPT

## 2021-02-24 PROCEDURE — 2580000003 HC RX 258: Performed by: EMERGENCY MEDICINE

## 2021-02-24 PROCEDURE — 85610 PROTHROMBIN TIME: CPT

## 2021-02-24 PROCEDURE — 81001 URINALYSIS AUTO W/SCOPE: CPT

## 2021-02-24 PROCEDURE — 36415 COLL VENOUS BLD VENIPUNCTURE: CPT

## 2021-02-24 PROCEDURE — 80053 COMPREHEN METABOLIC PANEL: CPT

## 2021-02-24 RX ORDER — CIPROFLOXACIN 500 MG/1
500 TABLET, FILM COATED ORAL 2 TIMES DAILY
Qty: 14 TABLET | Refills: 0 | Status: SHIPPED | OUTPATIENT
Start: 2021-02-24 | End: 2021-03-03

## 2021-02-24 RX ORDER — 0.9 % SODIUM CHLORIDE 0.9 %
1000 INTRAVENOUS SOLUTION INTRAVENOUS ONCE
Status: COMPLETED | OUTPATIENT
Start: 2021-02-24 | End: 2021-02-24

## 2021-02-24 RX ORDER — CIPROFLOXACIN 500 MG/1
500 TABLET, FILM COATED ORAL ONCE
Status: COMPLETED | OUTPATIENT
Start: 2021-02-24 | End: 2021-02-24

## 2021-02-24 RX ORDER — KETOROLAC TROMETHAMINE 30 MG/ML
30 INJECTION, SOLUTION INTRAMUSCULAR; INTRAVENOUS ONCE
Status: COMPLETED | OUTPATIENT
Start: 2021-02-24 | End: 2021-02-24

## 2021-02-24 RX ORDER — SODIUM CHLORIDE 0.9 % (FLUSH) 0.9 %
3 SYRINGE (ML) INJECTION EVERY 8 HOURS
Status: DISCONTINUED | OUTPATIENT
Start: 2021-02-24 | End: 2021-02-24 | Stop reason: HOSPADM

## 2021-02-24 RX ADMIN — CIPROFLOXACIN HYDROCHLORIDE 500 MG: 500 TABLET, FILM COATED ORAL at 17:51

## 2021-02-24 RX ADMIN — SODIUM CHLORIDE 1000 ML: 9 INJECTION, SOLUTION INTRAVENOUS at 16:39

## 2021-02-24 RX ADMIN — KETOROLAC TROMETHAMINE 30 MG: 30 INJECTION, SOLUTION INTRAMUSCULAR at 16:39

## 2021-02-24 RX ADMIN — Medication 3 ML: at 16:39

## 2021-02-24 ASSESSMENT — ENCOUNTER SYMPTOMS
FACIAL SWELLING: 0
SORE THROAT: 0
NAUSEA: 1
COUGH: 0
WHEEZING: 0
BACK PAIN: 1
EYE DISCHARGE: 0
VOICE CHANGE: 0
CHOKING: 0
CONSTIPATION: 0
TROUBLE SWALLOWING: 0
CHEST TIGHTNESS: 0
BLOOD IN STOOL: 0
SINUS PRESSURE: 0
EYE PAIN: 0
SHORTNESS OF BREATH: 0
VOMITING: 0
DIARRHEA: 0
EYE REDNESS: 0
STRIDOR: 0
ABDOMINAL PAIN: 1

## 2021-02-24 ASSESSMENT — PAIN DESCRIPTION - ORIENTATION: ORIENTATION: RIGHT;LOWER

## 2021-02-24 ASSESSMENT — PAIN SCALES - GENERAL: PAINLEVEL_OUTOF10: 1

## 2021-02-24 ASSESSMENT — PAIN DESCRIPTION - LOCATION: LOCATION: FLANK;BACK

## 2021-02-24 NOTE — ED TRIAGE NOTES
Patient presents to ED with c/o lower right side back/flank pain that started about 2 weeks ago and has not improved.  He reports this morning his urine was very dark colored and he went to his primary MD and was told to come to ED for eval because he had blood in his urine

## 2021-02-24 NOTE — ED PROVIDER NOTES
[address] ED  eMERGENCY dEPARTMENT eNCOUnter      Pt Name: Viola Vanegas  MRN: [id number]  [place] 1975  Date of evaluation: 2/24/2021  Provider: Thony Cabello MD    [address] [place] [address]       Chief Complaint   Patient presents with    Hematuria     Onset- 10am today.  Back Pain     Onset- 2 wks. - right lower flank region         HISTORY OF PRESENT ILLNESS   (Location/Symptom, Timing/Onset,Context/Setting, Quality, Duration, Modifying Factors, Severity)  Note limiting factors. Viola Vanegas is a 48 y.o. male who presents to the emergency department patient with history of anxiety depression alcohol abuse diabetes mellitus sleep apnea patient is a  seen in this emergency for back pain and patient was diagnosed with a lumbar sprain given pain medication and muscle relaxant patient noticed some blood in the urine today has positive history of kidney stone in the family no fever no chills no vomiting slight nausea but able to eat and drink. His pain is 6-7 out of 10 pain in the lower back slightly to the left no numbness tingling to the legs    HPI    NursingNotes were reviewed. REVIEW OF SYSTEMS    (2-9 systems for level 4, 10 or more for level 5)     Review of Systems   Constitutional: Negative. Negative for activity change and fever. HENT: Negative for congestion, drooling, facial swelling, mouth sores, nosebleeds, sinus pressure, sore throat, trouble swallowing and voice change. Eyes: Negative for pain, discharge, redness and visual disturbance. Respiratory: Negative for cough, choking, chest tightness, shortness of breath, wheezing and stridor. Cardiovascular: Negative for chest pain, palpitations and leg swelling. Gastrointestinal: Positive for abdominal pain and nausea. Negative for blood in stool, constipation, diarrhea and vomiting. Endocrine: Negative for cold intolerance, polyphagia and polyuria. Genitourinary: Positive for hematuria.  Negative for dysuria, flank pain, frequency, genital sores and urgency. Musculoskeletal: Positive for back pain. Negative for joint swelling, neck pain and neck stiffness. Skin: Negative for pallor and rash. Neurological: Negative for tremors, seizures, syncope, weakness, numbness and headaches. Hematological: Negative for adenopathy. Does not bruise/bleed easily. Psychiatric/Behavioral: Negative for agitation, behavioral problems, hallucinations and sleep disturbance. The patient is not hyperactive. All other systems reviewed and are negative. Except as noted above the remainder of the review of systems was reviewed and negative. PAST MEDICAL HISTORY     Past Medical History:   Diagnosis Date    Depression     Diabetes mellitus ([facility] [address]     ETOH abuse     Sleep apnea          SURGICALHISTORY     No past surgical history on file. CURRENT MEDICATIONS       Previous Medications    ALCOHOL SWABS 70 % PADS    1 box by Does not apply route daily    BLOOD GLUCOSE MONITORING SUPPL (ACCU-CHEK COMPACT CARE KIT) KIT    1 kit by Does not apply route daily    BLOOD GLUCOSE MONITORING SUPPL (ONETOUCH VERIO) W/DEVICE KIT    As directed    BLOOD GLUCOSE TEST STRIPS (ONE TOUCH ULTRA TEST) STRIP    Test TID Dx E11.65    BLOOD GLUCOSE TEST STRIPS (ONETOUCH VERIO) STRIP    1 each by In Vitro route daily As needed.     CHLORZOXAZONE (PARAFON FORTE DSC) 500 MG TABLET    Take 1 tablet by mouth 4 times daily as needed for Muscle spasms    ERTUGLIFLOZIN L-PYROGLUTAMICAC (STEGLATRO) 5 MG TABS    Once a day    HYDROXYZINE (VISTARIL) 25 MG CAPSULE    Take 2 capsules by mouth 3 times daily as needed for Anxiety    INSULIN PEN NEEDLE (PEN NEEDLES) 33G X 4 MM MISC    Use 1 daily with insulin    KETOROLAC (TORADOL) 10 MG TABLET    Take 1 tablet by mouth every 6 hours as needed for Pain    METFORMIN (GLUCOPHAGE) 500 MG TABLET    Take 1 tablet by mouth 2 times daily (with meals)    ONDANSETRON (ZOFRAN) 4 MG TABLET    Take 1 tablet by mouth every 8 hours as needed for Nausea    ONETOUCH DELICA LANCETS 74L MISC    bid    SERTRALINE (ZOLOFT) 50 MG TABLET    Take 50 mg by mouth daily    SITAGLIPTIN (JANUVIA) 100 MG TABLET    Take 1 tablet by mouth daily       ALLERGIES     Patient has no known allergies. FAMILY HISTORY     No family history on file.        SOCIAL HISTORY       Social History     Socioeconomic History    Marital status:      Spouse name: Not on file    Number of children: Not on file    Years of education: Not on file    Highest education level: Not on file   Occupational History    Not on file   Social Needs    Financial resource strain: Not on file    Food insecurity     Worry: Not on file     Inability: Not on file    Transportation needs     Medical: Not on file     Non-medical: Not on file   Tobacco Use    Smoking status: Former Smoker     Types: Cigarettes     Quit date:      Years since quittin.1    Smokeless tobacco: Never Used   Substance and Sexual Activity    Alcohol use: Yes     Comment: recovering ETOH last drink in August    Drug use: No     Comment: Daily    Sexual activity: Yes     Partners: Female   Lifestyle    Physical activity     Days per week: Not on file     Minutes per session: Not on file    Stress: Not on file   Relationships    Social connections     Talks on phone: Not on file     Gets together: Not on file     Attends Baptism service: Not on file     Active member of club or organization: Not on file     Attends meetings of clubs or organizations: Not on file     Relationship status: Not on file    Intimate partner violence     Fear of current or ex partner: Not on file     Emotionally abused: Not on file     Physically abused: Not on file     Forced sexual activity: Not on file   Other Topics Concern    Not on file   Social History Narrative    Not on file       SCREENINGS    [place] Coma Scale  Eye Opening: Spontaneous  Best Verbal Response: Oriented  Best Motor Response: Obeys commands  [place] Coma Scale Score: 15 @FLOW([id number])@      PHYSICAL EXAM    (up to 7 for level 4, 8 or more for level 5)     ED Triage Vitals [02/24/21 1614]   BP Temp Temp Source Pulse Resp SpO2 Height Weight   125/76 97.5 °F (36.4 °C) Oral 64 18 97 % 5' 11\" (1.803 m) 210 lb (95.3 kg)       Physical Exam  Vitals signs and nursing note reviewed. Constitutional:       General: He is not in acute distress. Appearance: Normal appearance. He is well-developed. He is not ill-appearing, toxic-appearing or diaphoretic. HENT:      Head: Normocephalic and atraumatic. Right Ear: Tympanic membrane, ear canal and external ear normal. There is no impacted cerumen. Left Ear: Tympanic membrane, ear canal and external ear normal.      Nose: No congestion or rhinorrhea. Mouth/Throat:      Pharynx: No oropharyngeal exudate or posterior oropharyngeal erythema. Eyes:      General: No scleral icterus. Right eye: No discharge. Left eye: No discharge. Extraocular Movements: Extraocular movements intact. Pupils: Pupils are equal, round, and reactive to light. Neck:      Musculoskeletal: Neck supple. No neck rigidity or muscular tenderness. Vascular: No carotid bruit. Cardiovascular:      Rate and Rhythm: Normal rate. Heart sounds: Normal heart sounds. No murmur. No gallop. Pulmonary:      Effort: No respiratory distress. Breath sounds: Normal breath sounds. No stridor. No wheezing or rhonchi. Chest:      Chest wall: No tenderness. Abdominal:      General: Bowel sounds are normal. There is no distension. Palpations: Abdomen is soft. There is no mass. Tenderness: There is no abdominal tenderness. There is no right CVA tenderness, guarding or rebound. Musculoskeletal: Normal range of motion. General: Tenderness present. No swelling or deformity. Right lower leg: No edema.       Comments: Attention to the back examined in supine and sitting position paralumbar spasm tenderness elicited on examination/CVA tenderness patient has no point tenderness no hematoma no bruise noted straight leg raising is negative has no sensory deficit to the lower extremities   Lymphadenopathy:      Cervical: No cervical adenopathy. Skin:     General: Skin is warm. Capillary Refill: Capillary refill takes less than 2 seconds. Findings: No erythema or rash. Neurological:      General: No focal deficit present. Mental Status: He is alert and oriented to person, place, and time. Cranial Nerves: No cranial nerve deficit. Motor: No abnormal muscle tone. Psychiatric:         Behavior: Behavior normal.         Thought Content: Thought content normal.         DIAGNOSTIC RESULTS     EKG: All EKG's are interpreted by the Emergency Department Physician who either signs or Co-signsthis chart in the absence of a cardiologist.        RADIOLOGY:   [place] such as CT, Ultrasound and MRI are read by the radiologist. Plain radiographic images are visualized and preliminarily interpreted by the emergency physician with the below findings:      Interpretation per the Radiologist below, if available at the time ofthis note:    CT ABDOMEN PELVIS WO CONTRAST Additional Contrast? None   Final Result   1. NO FINDINGS OF UROLITHIASIS. LACK OF IV CONTRAST LIMITS FULL EVALUATION. IF THERE IS FINDINGS OF HEMATURIA THEN CONSIDER FOLLOW-UP UROLOGY AND CT UROGRAM TO FURTHER EVALUATE   2. THE REMAINDER THE CT SCAN THE ABDOMEN PELVIS OTHERWISE UNREMARKABLE         All CT scans at this facility use dose modulation, iterative reconstruction, and/or weight based dosing when appropriate to reduce radiation dose to as low as reasonably achievable.             ED BEDSIDE ULTRASOUND:   Performed by ED Physician - none    LABS:  Labs Reviewed   COMPREHENSIVE METABOLIC PANEL - Abnormal; Notable for the following components:       Result Value    Calcium 10.1 (*)     Albumin 4.8 (*)     Alkaline Phosphatase 119 (*)     All other components within normal limits   CBC WITH AUTO DIFFERENTIAL - Abnormal; Notable for the following components:    RBC 4.38 (*)     Hemoglobin 13.4 (*)     Hematocrit 39.0 (*)     All other components within normal limits   URINE RT REFLEX TO CULTURE - Abnormal; Notable for the following components:    Protein, UA 30 (*)     All other components within normal limits   MICROSCOPIC URINALYSIS - Abnormal; Notable for the following components:    RBC, UA >100 (*)     Bacteria, UA RARE (*)     All other components within normal limits   PROTIME-INR       All other labs were within normal range or not returned as of this dictation. EMERGENCY DEPARTMENT COURSE and DIFFERENTIAL DIAGNOSIS/MDM:   Vitals:    Vitals:    02/24/21 1614   BP: 125/76   Pulse: 64   Resp: 18   Temp: 97.5 °F (36.4 °C)   TempSrc: Oral   SpO2: 97%   Weight: 210 lb (95.3 kg)   Height: 5' 11\" (1.803 m)           MDM    CRITICAL CARE TIME   Total Critical Care time was  minutes, excluding separately reportableprocedures. There was a high probability of clinicallysignificant/life threatening deterioration in the patient's condition which required my urgent intervention. CONSULTS:  None    PROCEDURES:  Unless otherwise noted below, none     Procedures    FINAL IMPRESSION      1. Back strain, initial encounter    2. Gross hematuria    3.  Acute cystitis with hematuria          DISPOSITION/PLAN   DISPOSITION Decision To Discharge 02/24/2021 05:33:45 PM      PATIENT REFERRED TO:  Kalie Pearson MD  [address]  [phone]    In 2 days  If symptoms worsen      DISCHARGE MEDICATIONS:  New Prescriptions    CIPROFLOXACIN (CIPRO) 500 MG TABLET    Take 1 tablet by mouth 2 times daily for 7 days          (Please note that portions of this note were completed with a voice recognition program.  Efforts were made to edit the dictations but occasionally words are mis-transcribed.)    Gail Coronado MD (electronically signed)  Attending Emergency Physician       Gail Coronado MD  02/24/21 2356

## 2021-04-25 ENCOUNTER — APPOINTMENT (OUTPATIENT)
Dept: GENERAL RADIOLOGY | Age: 46
End: 2021-04-25
Payer: MEDICAID

## 2021-04-25 ENCOUNTER — HOSPITAL ENCOUNTER (EMERGENCY)
Age: 46
Discharge: HOME | End: 2021-04-25
Attending: EMERGENCY MEDICINE
Payer: MEDICAID

## 2021-04-25 VITALS
HEART RATE: 64 BPM | OXYGEN SATURATION: 98 % | DIASTOLIC BLOOD PRESSURE: 77 MMHG | TEMPERATURE: 97.2 F | RESPIRATION RATE: 20 BRPM | SYSTOLIC BLOOD PRESSURE: 117 MMHG

## 2021-04-25 DIAGNOSIS — B34.9 ACUTE VIRAL SYNDROME: Primary | ICD-10-CM

## 2021-04-25 LAB — SARS-COV-2, NAAT: NOT DETECTED

## 2021-04-25 PROCEDURE — 99282 EMERGENCY DEPT VISIT SF MDM: CPT

## 2021-04-25 PROCEDURE — 87635 SARS-COV-2 COVID-19 AMP PRB: CPT

## 2021-04-25 PROCEDURE — 71045 X-RAY EXAM CHEST 1 VIEW: CPT

## 2021-04-25 RX ORDER — KETOROLAC TROMETHAMINE 10 MG/1
10 TABLET, FILM COATED ORAL EVERY 6 HOURS PRN
Qty: 20 TABLET | Refills: 0 | Status: SHIPPED | OUTPATIENT
Start: 2021-04-25 | End: 2022-05-12

## 2021-04-25 ASSESSMENT — ENCOUNTER SYMPTOMS
ABDOMINAL DISTENTION: 0
DIARRHEA: 0
VOMITING: 0
COLOR CHANGE: 0
RHINORRHEA: 0
SHORTNESS OF BREATH: 0
ABDOMINAL PAIN: 0
WHEEZING: 0
SORE THROAT: 0
APNEA: 0
PHOTOPHOBIA: 0
COUGH: 0
EYE PAIN: 0
NAUSEA: 0
CONSTIPATION: 0
BACK PAIN: 0
SINUS PRESSURE: 0

## 2021-04-25 ASSESSMENT — PAIN DESCRIPTION - DESCRIPTORS: DESCRIPTORS: ACHING

## 2021-04-25 ASSESSMENT — PAIN DESCRIPTION - PAIN TYPE: TYPE: ACUTE PAIN

## 2021-04-25 ASSESSMENT — PAIN DESCRIPTION - FREQUENCY: FREQUENCY: CONTINUOUS

## 2021-04-26 NOTE — ED PROVIDER NOTES
[address] [place] [address] ED  eMERGENCY dEPARTMENT eNCOUnter      Pt Name: Javier Jaicnto  MRN: [id number]  [place] 1975  Date of evaluation: 4/25/2021  Provider: Ivy Man MD    CHIEF COMPLAINT       Chief Complaint   Patient presents with    Illness     started today, pt c/o shortness of breath and body aches, daughter has been sick since [place]   (Location/Symptom, Timing/Onset,Context/Setting, Quality, Duration, Modifying Factors, Severity)  Note limiting factors. Javier Jacinto is a 48 y.o. male who presents to the emergency department with complaint of flulike symptoms since this morning. Daughter has had nonproductive cough, loss of taste and smell, generalized body aches for 3 days. No exposure to Covid. No other systemic symptoms. Pain is 5 in a scale 1-10 and achy. Pain does not radiate. HPI    Nursing Notes were reviewed. REVIEW OF SYSTEMS    (2-9 systems for level 4, 10 or more for level 5)     Review of Systems   Constitutional: Positive for fatigue. Negative for activity change, appetite change, chills and fever. HENT: Negative for congestion, ear discharge, ear pain, hearing loss, rhinorrhea, sinus pressure and sore throat. Eyes: Negative for photophobia, pain and visual disturbance. Respiratory: Negative for apnea, cough, shortness of breath and wheezing. Cardiovascular: Negative for chest pain, palpitations and leg swelling. Gastrointestinal: Negative for abdominal distention, abdominal pain, constipation, diarrhea, nausea and vomiting. Endocrine: Negative for cold intolerance, heat intolerance and polyuria. Genitourinary: Negative for dysuria, flank pain, frequency and urgency. Musculoskeletal: Positive for myalgias. Negative for arthralgias, back pain, gait problem and neck stiffness. Skin: Negative for color change, pallor and rash. Allergic/Immunologic: Negative for food allergies and immunocompromised state. Neurological: Negative for dizziness, tremors, syncope, weakness, light-headedness and headaches. Psychiatric/Behavioral: Negative for agitation, confusion and hallucinations. All other systems reviewed and are negative. Except as noted above the remainder of the review of systems was reviewed and negative. PAST MEDICAL HISTORY     Past Medical History:   Diagnosis Date    Depression     Diabetes mellitus ([facility] [address]     ETOH abuse     Sleep apnea          SURGICAL HISTORY     History reviewed. No pertinent surgical history. CURRENT MEDICATIONS       Discharge Medication List as of 4/25/2021  8:10 PM      CONTINUE these medications which have NOT CHANGED    Details   metFORMIN (GLUCOPHAGE) 500 MG tablet Take 1 tablet by mouth 2 times daily (with meals), Disp-60 tablet,R-3Normal      sertraline (ZOLOFT) 50 MG tablet Take 50 mg by mouth dailyHistorical Med      SITagliptin (JANUVIA) 100 MG tablet Take 1 tablet by mouth daily, Disp-90 tablet,R-3Normal      Ertugliflozin L-PyroglutamicAc (STEGLATRO) 5 MG TABS Once a day, Disp-30 tablet,R-3Normal      OneTouch Delica Lancets 11L MISC bid, Disp-100 each,R-5Normal      !! blood glucose test strips (ONETOUCH VERIO) strip 1 each by In Vitro route daily As needed. , Disp-100 each,R-3Normal      Blood Glucose Monitoring Suppl ([place]) w/Device KIT As directed, Disp-1 kit,R-0Normal      !! blood glucose test strips (ONE TOUCH ULTRA TEST) strip Test TID Dx E11.65, Disp-100 each,R-3Normal      Insulin Pen Needle (PEN NEEDLES) 33G X 4 MM MISC Use 1 daily with insulin, Disp-100 each,R-3Normal      Blood Glucose Monitoring Suppl (ACCU-CHEK COMPACT CARE KIT) KIT DAILY Starting Tue 2/11/2020, Disp-1 kit, R-0, Print      Alcohol Swabs 70 % PADS DAILY Starting Tue 2/11/2020, Disp-5 each, R-3, Print      ondansetron (ZOFRAN) 4 MG tablet Take 1 tablet by mouth every 8 hours as needed for Nausea, Disp-20 tablet, R-0Print      hydrOXYzine (VISTARIL) 25 MG capsule Take 2 capsules by mouth 3 times daily as needed for Anxiety, Disp-30 capsule, R-0Print       !! - Potential duplicate medications found. Please discuss with provider. ALLERGIES     Patient has no known allergies. FAMILY HISTORY     History reviewed. No pertinent family history. SOCIAL HISTORY       Social History     Socioeconomic History    Marital status:      Spouse name: None    Number of children: None    Years of education: None    Highest education level: None   Occupational History    None   Social Needs    Financial resource strain: None    Food insecurity     Worry: None     Inability: None    Transportation needs     Medical: None     Non-medical: None   Tobacco Use    Smoking status: Former Smoker     Types: Cigarettes     Quit date:      Years since quittin.3    Smokeless tobacco: Never Used   Substance and Sexual Activity    Alcohol use: Yes     Comment: recovering ETOH last drink in August    Drug use: No     Comment: Daily    Sexual activity: Yes     Partners: Female   Lifestyle    Physical activity     Days per week: None     Minutes per session: None    Stress: None   Relationships    Social connections     Talks on phone: None     Gets together: None     Attends [employer]ism service: None     Active member of club or organization: None     Attends meetings of clubs or organizations: None     Relationship status: None    Intimate partner violence     Fear of current or ex partner: None     Emotionally abused: None     Physically abused: None     Forced sexual activity: None   Other Topics Concern    None   Social History Narrative    None       SCREENINGS             PHYSICAL EXAM    (up to 7 for level 4, 8 or more for level 5)     ED Triage Vitals [21]   BP Temp Temp Source Pulse Resp SpO2 Height Weight   117/77 97.2 °F (36.2 °C) Temporal 64 20 98 % -- --       Physical Exam  Vitals signs and nursing note reviewed.    Constitutional: Neurological:      General: No focal deficit present. Mental Status: He is alert and oriented to person, place, and time. Mental status is at baseline. Cranial Nerves: No cranial nerve deficit. Sensory: No sensory deficit. Motor: No weakness or abnormal muscle tone. Coordination: Coordination normal.      Gait: Gait normal.      Deep Tendon Reflexes: Reflexes are normal and symmetric. Reflexes normal.   Psychiatric:         Behavior: Behavior normal.         Thought Content: Thought content normal.         Judgment: Judgment normal.         DIAGNOSTIC RESULTS     EKG: All EKG's are interpreted by the Emergency Department Physician who either signs or Co-signs this chart in the absence of a cardiologist.        RADIOLOGY:   Non-plain film images such as CT, Ultrasound and MRI are read by the radiologist. [place] radiographicimages are visualized and preliminarily interpreted by the emergency physician with the below findings:    Chest x-ray showed no acute cardiopulmonary process. Interpretation per the Radiologist below, if available at the time of this note:    XR CHEST PORTABLE    (Results Pending)         ED BEDSIDE ULTRASOUND:   Performed by ED Physician - none    LABS:  Labs Reviewed   COVID-19, RAPID       All other labs were within normal range or not returned as of this dictation.     EMERGENCY DEPARTMENT COURSE and DIFFERENTIALDIAGNOSIS/MDM:   Vitals:    Vitals:    04/25/21 1925   BP: 117/77   Pulse: 64   Resp: 20   Temp: 97.2 °F (36.2 °C)   TempSrc: Temporal   SpO2: 98%           MDM  Number of Diagnoses or Management Options     Amount and/or Complexity of Data Reviewed  Clinical lab tests: reviewed and ordered  Tests in the radiology section of CPT®: reviewed and ordered    Risk of Complications, Morbidity, and/or Mortality  Presenting problems: moderate  Diagnostic procedures: moderate  Management options: moderate    Patient Progress  Patient progress: improved      CRITICAL CARE TIME   Total Critical Care time was  minutes, excluding separately reportable procedures. There was a high probability of clinically significant/life threatening deterioration in the patient's condition which required my urgentintervention. CONSULTS:  None    PROCEDURES:  Unless otherwise noted below, none     Procedures    FINAL IMPRESSION      1.  Acute viral syndrome          DISPOSITION/PLAN   DISPOSITION Decision To Discharge 04/25/2021 08:08:59 PM      PATIENT REFERRED TO:  Little López MD  [facility]  [phone]    In 3 days        DISCHARGE MEDICATIONS:  Discharge Medication List as of 4/25/2021  8:10 PM             (Please note that portions of this note were completed with a voice recognitionprogram.  Efforts were made to edit the dictations but occasionally words are mis-transcribed.)    Sravan Weinstein MD (electronically signed)  Attending Emergency Physician          Sravan Weinstein MD  04/26/21 0025

## 2021-10-31 ENCOUNTER — HOSPITAL ENCOUNTER (EMERGENCY)
Age: 46
Discharge: HOME | End: 2021-10-31
Attending: EMERGENCY MEDICINE
Payer: MEDICAID

## 2021-10-31 ENCOUNTER — APPOINTMENT (OUTPATIENT)
Dept: GENERAL RADIOLOGY | Age: 46
End: 2021-10-31
Payer: MEDICAID

## 2021-10-31 VITALS
HEART RATE: 78 BPM | SYSTOLIC BLOOD PRESSURE: 160 MMHG | RESPIRATION RATE: 16 BRPM | TEMPERATURE: 98.1 F | WEIGHT: 240 LBS | BODY MASS INDEX: 33.6 KG/M2 | OXYGEN SATURATION: 97 % | DIASTOLIC BLOOD PRESSURE: 92 MMHG | HEIGHT: 71 IN

## 2021-10-31 DIAGNOSIS — J20.9 ACUTE BRONCHITIS, UNSPECIFIED ORGANISM: Primary | ICD-10-CM

## 2021-10-31 PROCEDURE — U0005 INFEC AGEN DETEC AMPLI PROBE: HCPCS

## 2021-10-31 PROCEDURE — 99282 EMERGENCY DEPT VISIT SF MDM: CPT

## 2021-10-31 PROCEDURE — 71045 X-RAY EXAM CHEST 1 VIEW: CPT

## 2021-10-31 PROCEDURE — U0003 INFECTIOUS AGENT DETECTION BY NUCLEIC ACID (DNA OR RNA); SEVERE ACUTE RESPIRATORY SYNDROME CORONAVIRUS 2 (SARS-COV-2) (CORONAVIRUS DISEASE [COVID-19]), AMPLIFIED PROBE TECHNIQUE, MAKING USE OF HIGH THROUGHPUT TECHNOLOGIES AS DESCRIBED BY CMS-2020-01-R: HCPCS

## 2021-10-31 RX ORDER — GUAIFENESIN 600 MG/1
1200 TABLET, EXTENDED RELEASE ORAL 2 TIMES DAILY
Qty: 40 TABLET | Refills: 0 | Status: SHIPPED | OUTPATIENT
Start: 2021-10-31 | End: 2021-11-10

## 2021-10-31 RX ORDER — AZITHROMYCIN 500 MG/1
500 TABLET, FILM COATED ORAL DAILY
Qty: 3 TABLET | Refills: 0 | Status: SHIPPED | OUTPATIENT
Start: 2021-10-31 | End: 2021-11-03

## 2021-10-31 RX ORDER — DEXAMETHASONE 4 MG/1
4 TABLET ORAL 2 TIMES DAILY WITH MEALS
Qty: 20 TABLET | Refills: 0 | Status: SHIPPED | OUTPATIENT
Start: 2021-10-31 | End: 2021-11-10

## 2021-10-31 RX ORDER — BENZONATATE 100 MG/1
100 CAPSULE ORAL 2 TIMES DAILY PRN
Qty: 20 CAPSULE | Refills: 0 | Status: SHIPPED | OUTPATIENT
Start: 2021-10-31 | End: 2021-11-07

## 2021-10-31 RX ORDER — AMOXICILLIN 500 MG/1
500 CAPSULE ORAL 3 TIMES DAILY
COMMUNITY
End: 2022-05-12

## 2021-10-31 ASSESSMENT — ENCOUNTER SYMPTOMS
PHOTOPHOBIA: 0
RHINORRHEA: 0
SORE THROAT: 0
COUGH: 1
NAUSEA: 0
ABDOMINAL DISTENTION: 0
CONSTIPATION: 0
VOMITING: 0
BACK PAIN: 0
EYE PAIN: 0
WHEEZING: 0
APNEA: 0
SINUS PRESSURE: 0
COLOR CHANGE: 0
ABDOMINAL PAIN: 0
SHORTNESS OF BREATH: 1
DIARRHEA: 0

## 2021-10-31 ASSESSMENT — PAIN SCALES - GENERAL: PAINLEVEL_OUTOF10: 7

## 2021-10-31 ASSESSMENT — PAIN DESCRIPTION - LOCATION: LOCATION: GENERALIZED;HEAD

## 2021-10-31 ASSESSMENT — PAIN DESCRIPTION - PAIN TYPE: TYPE: ACUTE PAIN

## 2021-10-31 ASSESSMENT — PAIN DESCRIPTION - ONSET: ONSET: SUDDEN

## 2021-10-31 ASSESSMENT — PAIN DESCRIPTION - DESCRIPTORS: DESCRIPTORS: ACHING

## 2021-10-31 ASSESSMENT — PAIN DESCRIPTION - FREQUENCY: FREQUENCY: CONTINUOUS

## 2021-10-31 ASSESSMENT — PAIN DESCRIPTION - PROGRESSION: CLINICAL_PROGRESSION: GRADUALLY WORSENING

## 2021-10-31 NOTE — Clinical Note
Patient discharged to home with instructions for bronchitis and concern for COVID. Increase fluid. Monitor for fever . Quarantine  Until your test is resulted then if test is Positive for 10 days.  Rx for amoxicillin

## 2021-10-31 NOTE — ED PROVIDER NOTES
[facility] ED  eMERGENCY dEPARTMENT eNCOUnter      Pt Name: Tahira Aponte  MRN: [id number]  [place] 1975  Date of evaluation: 10/31/2021  Provider: Arnaldo Bear MD    CHIEF COMPLAINT       Chief Complaint   Patient presents with    Cough     for the last three days     Chills     started today     Generalized Body Aches     started today     Shortness of Breath     started todday Pt talking in full sentences non labored breathing at this time          HISTORY OF PRESENT ILLNESS   (Location/Symptom, Timing/Onset,Context/Setting, Quality, Duration, Modifying Factors, Severity)  Note limiting factors. Tahira Aponte is a 48 y.o. male who presents to the emergency department with complaint of cough, congestion, generalized body aches, fever, chills and shortness of breath of 3 days duration. Cough is nonproductive. History of type 2 diabetes. Exposure to Covid or sick contacts. Pain is 7 in a scale of 1-10 and achy. Non-smoker. Did not get vaccinated against Covid. HPI    Nursing Notes were reviewed. REVIEW OF SYSTEMS    (2-9 systems for level 4, 10 or more for level 5)     Review of Systems   Constitutional: Positive for chills and fever. Negative for activity change, appetite change and fatigue. HENT: Positive for congestion. Negative for ear discharge, ear pain, hearing loss, rhinorrhea, sinus pressure and sore throat. Eyes: Negative for photophobia, pain and visual disturbance. Respiratory: Positive for cough and shortness of breath. Negative for apnea and wheezing. Cardiovascular: Negative for chest pain, palpitations and leg swelling. Gastrointestinal: Negative for abdominal distention, abdominal pain, constipation, diarrhea, nausea and vomiting. Endocrine: Negative for cold intolerance, heat intolerance and polyuria. Genitourinary: Negative for dysuria, flank pain, frequency and urgency. Musculoskeletal: Positive for myalgias.  Negative for arthralgias, back pain, gait problem and neck stiffness. Skin: Negative for color change, pallor and rash. Allergic/Immunologic: Negative for food allergies and immunocompromised state. Neurological: Negative for dizziness, tremors, syncope, weakness, light-headedness and headaches. Psychiatric/Behavioral: Negative for agitation, confusion and hallucinations. All other systems reviewed and are negative. Except as noted above the remainder of the review of systems was reviewed and negative. PAST MEDICAL HISTORY     Past Medical History:   Diagnosis Date    Depression     Diabetes mellitus ([facility] [address]     ETOH abuse     Sleep apnea          SURGICAL HISTORY     History reviewed. No pertinent surgical history. CURRENT MEDICATIONS       Previous Medications    ALCOHOL SWABS 70 % PADS    1 box by Does not apply route daily    AMOXICILLIN (AMOXIL) 500 MG CAPSULE    Take 500 mg by mouth 3 times daily    BLOOD GLUCOSE MONITORING SUPPL (ACCU-CHEK COMPACT CARE KIT) KIT    1 kit by Does not apply route daily    BLOOD GLUCOSE MONITORING SUPPL (ONETOUCH VERIO) W/DEVICE KIT    As directed    BLOOD GLUCOSE TEST STRIPS (ONE TOUCH ULTRA TEST) STRIP    Test TID Dx G56.57    BLOOD GLUCOSE TEST STRIPS (ONETOUCH VERIO) STRIP    1 each by In Vitro route daily As needed.     ERTUGLIFLOZIN L-PYROGLUTAMICAC (STEGLATRO) 5 MG TABS    Once a day    HYDROXYZINE (VISTARIL) 25 MG CAPSULE    Take 2 capsules by mouth 3 times daily as needed for Anxiety    INSULIN PEN NEEDLE (PEN NEEDLES) 33G X 4 MM MISC    Use 1 daily with insulin    KETOROLAC (TORADOL) 10 MG TABLET    Take 1 tablet by mouth every 6 hours as needed for Pain    METFORMIN (GLUCOPHAGE) 500 MG TABLET    Take 1 tablet by mouth 2 times daily (with meals)    ONDANSETRON (ZOFRAN) 4 MG TABLET    Take 1 tablet by mouth every 8 hours as needed for Nausea    [facility]J M[facility]    bid    SERTRALINE (ZOLOFT) 50 MG TABLET    Take 50 mg by mouth daily    SITAGLIPTIN ([place]) 100 MG TABLET    Take 1 tablet by mouth daily       ALLERGIES     Patient has no known allergies. FAMILY HISTORY     History reviewed. No pertinent family history. SOCIAL HISTORY       Social History     Socioeconomic History    Marital status:      Spouse name: None    Number of children: None    Years of education: None    Highest education level: None   Occupational History    None   Tobacco Use    Smoking status: Former Smoker     Types: Cigarettes     Quit date:      Years since quittin.8    Smokeless tobacco: Never Used   Substance and Sexual Activity    Alcohol use: Not Currently     Comment: recovering ETOH last drink in August    Drug use: No     Comment: Daily    Sexual activity: Yes     Partners: Female   Other Topics Concern    None   Social History Narrative    None     Social Determinants of Health     Financial Resource Strain:     Difficulty of Paying Living Expenses:    Food Insecurity:     Worried About Running Out of Food in the Last Year:     [address] in the Last Year:    Transportation Needs:     Lack of Transportation (Medical):      Lack of Transportation (Non-Medical):    Physical Activity:     Days of Exercise per Week:     Minutes of Exercise per Session:    Stress:     Feeling of Stress :    Social Connections:     Frequency of Communication with Friends and Family:     Frequency of Social Gatherings with Friends and Family:     Attends Rastafarian Services:     Active Member of Clubs or Organizations:     Attends Club or Organization Meetings:     Marital Status:    Intimate Partner Violence:     Fear of Current or Ex-Partner:     Emotionally Abused:     Physically Abused:     Sexually Abused:        SCREENINGS             PHYSICAL EXAM    (up to 7 for level 4, 8 or more for level 5)     ED Triage Vitals [10/31/21 1715]   BP Temp Temp Source Pulse Resp SpO2 Height Weight   (!) 160/92 98.1 °F (36.7 °C) Oral 78 16 97 % 5' 11\" (1.803 m) 240 lb (108.9 kg)       Physical Exam  Vitals and nursing note reviewed. Constitutional:       General: He is not in acute distress. Appearance: He is well-developed. He is obese. He is not ill-appearing, toxic-appearing or diaphoretic. Interventions: He is not intubated. HENT:      Head: Normocephalic and atraumatic. Nose: Nose normal.      Mouth/Throat:      Mouth: Mucous membranes are moist.      Pharynx: No pharyngeal swelling or oropharyngeal exudate. Eyes:      General: No scleral icterus. Right eye: No discharge. Left eye: No discharge. Conjunctiva/sclera: Conjunctivae normal.      Pupils: Pupils are equal, round, and reactive to light. Neck:      Thyroid: No thyromegaly. Vascular: No hepatojugular reflux or JVD. Trachea: No tracheal deviation. Cardiovascular:      Rate and Rhythm: Normal rate and regular rhythm. No extrasystoles are present. Pulses: Normal pulses. No decreased pulses. Heart sounds: Normal heart sounds. No murmur heard. No friction rub. No gallop. Pulmonary:      Effort: Pulmonary effort is normal. No tachypnea, bradypnea, accessory muscle usage or respiratory distress. He is not intubated. Breath sounds: Normal breath sounds. No stridor. No decreased breath sounds, wheezing, rhonchi or rales. Chest:      Chest wall: No mass, deformity, tenderness, crepitus or edema. There is no dullness to percussion. Abdominal:      General: Bowel sounds are normal. There is no distension. Palpations: Abdomen is soft. There is no hepatomegaly, splenomegaly or mass. Tenderness: There is no abdominal tenderness. There is no guarding or rebound. Genitourinary:     Rectum: Guaiac result negative. Musculoskeletal:         General: No tenderness or deformity. Normal range of motion. Cervical back: Normal range of motion and neck supple. Right lower leg: No tenderness. No edema.       Left lower leg: No tenderness. No edema. Lymphadenopathy:      Cervical: No cervical adenopathy. Skin:     General: Skin is warm and dry. Capillary Refill: Capillary refill takes less than 2 seconds. Coloration: Skin is not cyanotic or pale. Findings: No ecchymosis, erythema or rash. Nails: There is no clubbing. Neurological:      General: No focal deficit present. Mental Status: He is alert. He is disoriented. Cranial Nerves: No cranial nerve deficit. Motor: No weakness or abnormal muscle tone. Coordination: Coordination normal.      Deep Tendon Reflexes: Reflexes are normal and symmetric. Reflexes normal.   Psychiatric:         Mood and Affect: Mood is not anxious. Behavior: Behavior normal. Behavior is not agitated. Thought Content: Thought content normal.         Judgment: Judgment normal.         DIAGNOSTIC RESULTS     EKG: All EKG's are interpreted by the Emergency Department Physician who either signs or Co-signs this chart in the absence of a cardiologist.        RADIOLOGY:   Non-plain film images such as CT, Ultrasound and MRI are read by the radiologist. [facility] are [facility] and preliminarily interpreted by the emergency physician with the below findings:    Chest x-ray shows no acute cardiopulmonary process. Interpretation per the Radiologist below, if available at the time of this note:    XR CHEST PORTABLE    (Results Pending)         ED BEDSIDE ULTRASOUND:   Performed by ED Physician - none    LABS:  Labs Reviewed   COVID-19   COVID-19       All other labs were within normal range or not returned as of this dictation.     EMERGENCY DEPARTMENT COURSE and DIFFERENTIALDIAGNOSIS/MDM:   Vitals:    Vitals:    10/31/21 1715   BP: (!) 160/92   Pulse: 78   Resp: 16   Temp: 98.1 °F (36.7 °C)   TempSrc: Oral   SpO2: 97%   Weight: 240 lb (108.9 kg)   Height: 5' 11\" (1.803 m)           MDM  Number of Diagnoses or Management Options     Amount and/or Complexity of Data Reviewed  Clinical lab tests: ordered  Tests in the radiology section of CPT®: reviewed and ordered    Risk of Complications, Morbidity, and/or Mortality  Presenting problems: moderate  Diagnostic procedures: moderate  Management options: moderate    Patient Progress  Patient progress: improved      CRITICAL CARE TIME   Total Critical Care time was  minutes, excluding separately reportable procedures. There was a high probability of clinically significant/life threatening deterioration in the patient's condition which required my urgentintervention. CONSULTS:  None    PROCEDURES:  Unless otherwise noted below, none     Procedures    FINAL IMPRESSION      1.  Acute bronchitis, unspecified organism          DISPOSITION/PLAN   DISPOSITION Decision To Discharge 10/31/2021 06:10:04 PM      PATIENT REFERRED TO:  Gertrudis Marshall MD  [facility]  [phone]    In 3 days        DISCHARGE MEDICATIONS:  New Prescriptions    AZITHROMYCIN (ZITHROMAX) 500 MG TABLET    Take 1 tablet by mouth daily for 3 days    BENZONATATE (TESSALON) 100 MG CAPSULE    Take 1 capsule by mouth 2 times daily as needed for Cough    DEXAMETHASONE (DECADRON) 4 MG TABLET    Take 1 tablet by mouth 2 times daily (with meals) for 10 days    GUAIFENESIN (MUCINEX) 600 MG EXTENDED RELEASE TABLET    Take 2 tablets by mouth 2 times daily for 10 days          (Please note that portions of this note were completed with a voice recognitionprogram.  Efforts were made to edit the dictations but occasionally words are mis-transcribed.)    Ranjana Su MD (electronically signed)  Attending Emergency Physician          Ranjana Su MD  10/31/21 8806

## 2021-11-01 ENCOUNTER — CARE COORDINATION (OUTPATIENT)
Dept: CARE COORDINATION | Age: 46
End: 2021-11-01

## 2021-11-02 ENCOUNTER — CARE COORDINATION (OUTPATIENT)
Dept: CARE COORDINATION | Age: 46
End: 2021-11-02

## 2021-11-02 LAB
SARS-COV-2: DETECTED
SOURCE: ABNORMAL

## 2021-12-21 ENCOUNTER — APPOINTMENT (OUTPATIENT)
Dept: CT IMAGING | Age: 46
End: 2021-12-21
Payer: MEDICAID

## 2021-12-21 ENCOUNTER — HOSPITAL ENCOUNTER (EMERGENCY)
Age: 46
Discharge: HOME | End: 2021-12-21
Payer: MEDICAID

## 2021-12-21 VITALS
BODY MASS INDEX: 33.6 KG/M2 | RESPIRATION RATE: 18 BRPM | OXYGEN SATURATION: 96 % | DIASTOLIC BLOOD PRESSURE: 104 MMHG | SYSTOLIC BLOOD PRESSURE: 158 MMHG | TEMPERATURE: 98.6 F | WEIGHT: 240 LBS | HEART RATE: 73 BPM | HEIGHT: 71 IN
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
## 2021-12-21 DIAGNOSIS — S13.4XXA WHIPLASH INJURY TO NECK, INITIAL ENCOUNTER: Primary | ICD-10-CM

## 2021-12-21 DIAGNOSIS — S09.90XA CLOSED HEAD INJURY, INITIAL ENCOUNTER: ICD-10-CM

## 2021-12-21 PROCEDURE — 96372 THER/PROPH/DIAG INJ SC/IM: CPT

## 2021-12-21 PROCEDURE — 99284 EMERGENCY DEPT VISIT MOD MDM: CPT

## 2021-12-21 PROCEDURE — 6360000002 HC RX W HCPCS: Performed by: PHYSICIAN ASSISTANT

## 2021-12-21 PROCEDURE — 70450 CT HEAD/BRAIN W/O DYE: CPT

## 2021-12-21 PROCEDURE — 72125 CT NECK SPINE W/O DYE: CPT

## 2021-12-21 RX ORDER — CYCLOBENZAPRINE HCL 5 MG
5 TABLET ORAL 2 TIMES DAILY PRN
Qty: 10 TABLET | Refills: 0 | Status: SHIPPED | OUTPATIENT
Start: 2021-12-21 | End: 2021-12-31

## 2021-12-21 RX ORDER — ETODOLAC 400 MG/1
400 TABLET, FILM COATED ORAL 2 TIMES DAILY
Qty: 14 TABLET | Refills: 0 | Status: SHIPPED | OUTPATIENT
Start: 2021-12-21 | End: 2022-05-12

## 2021-12-21 RX ORDER — KETOROLAC TROMETHAMINE 30 MG/ML
60 INJECTION, SOLUTION INTRAMUSCULAR; INTRAVENOUS ONCE
Status: COMPLETED | OUTPATIENT
Start: 2021-12-21 | End: 2021-12-21

## 2021-12-21 RX ORDER — ORPHENADRINE CITRATE 30 MG/ML
60 INJECTION INTRAMUSCULAR; INTRAVENOUS ONCE
Status: COMPLETED | OUTPATIENT
Start: 2021-12-21 | End: 2021-12-21

## 2021-12-21 RX ADMIN — ORPHENADRINE CITRATE 60 MG: 30 INJECTION INTRAMUSCULAR; INTRAVENOUS at 19:58

## 2021-12-21 RX ADMIN — KETOROLAC TROMETHAMINE 60 MG: 30 INJECTION, SOLUTION INTRAMUSCULAR at 19:58

## 2021-12-21 ASSESSMENT — ENCOUNTER SYMPTOMS
BACK PAIN: 0
ALLERGIC/IMMUNOLOGIC NEGATIVE: 1
TROUBLE SWALLOWING: 0
APNEA: 0
EYE PAIN: 0
SHORTNESS OF BREATH: 0
COLOR CHANGE: 0
ABDOMINAL PAIN: 0

## 2021-12-21 ASSESSMENT — PAIN SCALES - GENERAL
PAINLEVEL_OUTOF10: 8
PAINLEVEL_OUTOF10: 8

## 2021-12-21 ASSESSMENT — PAIN DESCRIPTION - DESCRIPTORS: DESCRIPTORS: ACHING

## 2021-12-21 ASSESSMENT — PAIN DESCRIPTION - ORIENTATION: ORIENTATION: POSTERIOR

## 2021-12-21 ASSESSMENT — PAIN DESCRIPTION - LOCATION: LOCATION: HEAD;NECK

## 2021-12-21 ASSESSMENT — PAIN DESCRIPTION - PAIN TYPE: TYPE: ACUTE PAIN

## 2021-12-21 NOTE — Clinical Note
Antonio Tellez was seen and treated in our emergency department on 12/21/2021. He may return to work on 12/24/2021. If you have any questions or concerns, please don't hesitate to call.       Basilia Stewart PA-C
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
## 2022-05-12 ENCOUNTER — HOSPITAL ENCOUNTER (EMERGENCY)
Age: 47
Discharge: HOME | End: 2022-05-12
Attending: EMERGENCY MEDICINE
Payer: MEDICAID

## 2022-05-12 VITALS
HEIGHT: 71 IN | HEART RATE: 73 BPM | BODY MASS INDEX: 33.6 KG/M2 | RESPIRATION RATE: 16 BRPM | DIASTOLIC BLOOD PRESSURE: 94 MMHG | WEIGHT: 240 LBS | TEMPERATURE: 97.8 F | SYSTOLIC BLOOD PRESSURE: 154 MMHG | OXYGEN SATURATION: 95 %

## 2022-05-12 DIAGNOSIS — R73.9 HYPERGLYCEMIA: Primary | ICD-10-CM

## 2022-05-12 LAB
ANION GAP SERPL CALCULATED.3IONS-SCNC: 15 MEQ/L (ref 9–15)
BASOPHILS ABSOLUTE: 0.1 K/UL (ref 0–0.1)
BASOPHILS RELATIVE PERCENT: 1.2 % (ref 0.2–1.2)
BILIRUBIN URINE: NEGATIVE
BLOOD, URINE: NEGATIVE
BUN BLDV-MCNC: 13 MG/DL (ref 6–20)
CALCIUM SERPL-MCNC: 9.9 MG/DL (ref 8.5–9.9)
CHLORIDE BLD-SCNC: 104 MEQ/L (ref 95–107)
CLARITY: CLEAR
CO2: 21 MEQ/L (ref 20–31)
COLOR: YELLOW
CREAT SERPL-MCNC: 0.91 MG/DL (ref 0.7–1.2)
EOSINOPHILS ABSOLUTE: 0.3 K/UL (ref 0–0.5)
EOSINOPHILS RELATIVE PERCENT: 5.1 % (ref 0.8–7)
GFR AFRICAN AMERICAN: >60
GFR NON-AFRICAN AMERICAN: >60
GLUCOSE BLD-MCNC: 108 MG/DL (ref 70–99)
GLUCOSE BLD-MCNC: 111 MG/DL (ref 70–99)
GLUCOSE URINE: NEGATIVE MG/DL
HCT VFR BLD CALC: 37.9 % (ref 42–52)
HEMOGLOBIN: 13.1 G/DL (ref 13.7–17.5)
IMMATURE GRANULOCYTES #: 0 K/UL
IMMATURE GRANULOCYTES %: 0.5 %
KETONES, URINE: NEGATIVE MG/DL
LEUKOCYTE ESTERASE, URINE: NEGATIVE
LYMPHOCYTES ABSOLUTE: 1.8 K/UL (ref 1.3–3.6)
LYMPHOCYTES RELATIVE PERCENT: 31.2 %
MCH RBC QN AUTO: 29.5 PG (ref 25.7–32.2)
MCHC RBC AUTO-ENTMCNC: 34.6 % (ref 32.3–36.5)
MCV RBC AUTO: 85.4 FL (ref 79–92.2)
MONOCYTES ABSOLUTE: 0.3 K/UL (ref 0.3–0.8)
MONOCYTES RELATIVE PERCENT: 5.6 % (ref 5.3–12.2)
NEUTROPHILS ABSOLUTE: 3.2 K/UL (ref 1.8–5.4)
NEUTROPHILS RELATIVE PERCENT: 56.4 % (ref 34–67.9)
NITRITE, URINE: NEGATIVE
PDW BLD-RTO: 12.6 % (ref 11.6–14.4)
PERFORMED ON: ABNORMAL
PH UA: 5 (ref 5–9)
PLATELET # BLD: 314 K/UL (ref 163–337)
POTASSIUM SERPL-SCNC: 4.3 MEQ/L (ref 3.4–4.9)
PROTEIN UA: NEGATIVE MG/DL
RBC # BLD: 4.44 M/UL (ref 4.63–6.08)
SODIUM BLD-SCNC: 140 MEQ/L (ref 135–144)
SPECIFIC GRAVITY UA: 1.02 (ref 1–1.03)
URINE REFLEX TO CULTURE: NORMAL
UROBILINOGEN, URINE: 0.2 E.U./DL
WBC # BLD: 5.7 K/UL (ref 4.2–9)

## 2022-05-12 PROCEDURE — 80048 BASIC METABOLIC PNL TOTAL CA: CPT

## 2022-05-12 PROCEDURE — 99284 EMERGENCY DEPT VISIT MOD MDM: CPT

## 2022-05-12 PROCEDURE — 36415 COLL VENOUS BLD VENIPUNCTURE: CPT

## 2022-05-12 PROCEDURE — 85025 COMPLETE CBC W/AUTO DIFF WBC: CPT

## 2022-05-12 PROCEDURE — 81003 URINALYSIS AUTO W/O SCOPE: CPT

## 2022-05-12 PROCEDURE — 2580000003 HC RX 258: Performed by: EMERGENCY MEDICINE

## 2022-05-12 RX ORDER — 0.9 % SODIUM CHLORIDE 0.9 %
500 INTRAVENOUS SOLUTION INTRAVENOUS ONCE
Status: COMPLETED | OUTPATIENT
Start: 2022-05-12 | End: 2022-05-12

## 2022-05-12 RX ADMIN — SODIUM CHLORIDE 500 ML: 9 INJECTION, SOLUTION INTRAVENOUS at 14:32

## 2022-05-12 ASSESSMENT — ENCOUNTER SYMPTOMS
SHORTNESS OF BREATH: 0
NAUSEA: 0
ABDOMINAL PAIN: 0
DIARRHEA: 0
VOMITING: 0
BACK PAIN: 0
SINUS PAIN: 0
COUGH: 0
EYE REDNESS: 0
EYE DISCHARGE: 0
COLOR CHANGE: 0
SORE THROAT: 0

## 2022-05-12 ASSESSMENT — PAIN - FUNCTIONAL ASSESSMENT
PAIN_FUNCTIONAL_ASSESSMENT: NONE - DENIES PAIN
PAIN_FUNCTIONAL_ASSESSMENT: NONE - DENIES PAIN

## 2022-05-12 NOTE — ED PROVIDER NOTES
[address] [place] [address] ED  EMERGENCY DEPARTMENT ENCOUNTER      Pt Name: Ludivina Champion  MRN: [id number]  [place] 1975  Date of evaluation: 5/12/2022  Provider: Dat Vasquez DO    CHIEF COMPLAINT       Chief Complaint   Patient presents with    Hyperglycemia     last 3-4 days has been taking metformin for a year and has not been working last few days      Chief complaint: High blood sugars   History of chief complaint: This 24-year-old gentleman presents the emergency department complaining of his blood sugars are running high. Patient states he was previously on insulin states his doctor switched him to metformin about a year ago. Patient states the past month his blood sugar started running in the range of 200-250. Patient states he did call his doctor and went in and had blood work done, plan was that if his hemoglobin A1c is high may restart him on insulin. Patient states the last 3 to 4 days its been much higher in the 400-500 range and thought he should get checked. Patient states he called his doctor's office but they advised him just to come to the ER. Patient denies any cough cold congestion fevers or chills no chest pain palpitation short of breath weak or dizzy no abdominal pain nausea vomiting or diarrhea no dysuria hematuria frequency or urgency. Patient states he has been eating and drinking normal there is been no change in medications. Nursing Notes were reviewed. REVIEW OF SYSTEMS    (2-9 systems for level 4, 10 or more for level 5)     Review of Systems   Constitutional: Negative for chills, diaphoresis and fever. HENT: Negative for congestion, sinus pain and sore throat. Eyes: Negative for discharge and redness. Respiratory: Negative for cough and shortness of breath. Cardiovascular: Negative for chest pain, palpitations and leg swelling. Gastrointestinal: Negative for abdominal pain, diarrhea, nausea and vomiting.    Genitourinary: Negative for dysuria, flank pain and frequency. Musculoskeletal: Negative for back pain and neck pain. Skin: Negative for color change and rash. Neurological: Negative for dizziness, weakness, numbness and headaches. Hematological: Negative for adenopathy. Except as noted above the remainder of the review of systems was reviewed and negative. PAST MEDICAL HISTORY     Past Medical History:   Diagnosis Date    Depression     Diabetes mellitus ([facility] [address]     ETOH abuse     Sleep apnea          SURGICAL HISTORY     History reviewed. No pertinent surgical history. CURRENT MEDICATIONS       Previous Medications    ALCOHOL SWABS 70 % PADS    1 box by Does not apply route daily    BLOOD GLUCOSE MONITORING SUPPL (ACCU-CHEK COMPACT CARE KIT) KIT    1 kit by Does not apply route daily    BLOOD GLUCOSE MONITORING SUPPL (ONETOUCH VERIO) W/DEVICE KIT    As directed    BLOOD GLUCOSE TEST STRIPS (ONE TOUCH ULTRA TEST) STRIP    Test TID Dx E11.65    BLOOD GLUCOSE TEST STRIPS (ONETOUCH VERIO) STRIP    1 each by In Vitro route daily As needed. INSULIN PEN NEEDLE (PEN NEEDLES) 33G X 4 MM MISC    Use 1 daily with insulin    METFORMIN (GLUCOPHAGE) 500 MG TABLET    Take 1 tablet by mouth 2 times daily (with meals)    ONETOUCH DELICA LANCETS 68L MISC    bid    SERTRALINE (ZOLOFT) 50 MG TABLET    Take 50 mg by mouth daily       ALLERGIES     Patient has no known allergies. FAMILY HISTORY     History reviewed. No pertinent family history.        SOCIAL HISTORY       Social History     Socioeconomic History    Marital status:      Spouse name: None    Number of children: None    Years of education: None    Highest education level: None   Occupational History    None   Tobacco Use    Smoking status: Former Smoker     Types: Cigarettes     Quit date:      Years since quittin.3    Smokeless tobacco: Never Used   Vaping Use    Vaping Use: Never used   Substance and Sexual Activity    Alcohol use: Not Currently     Comment: recovering ETOH last drink in August    Drug use: No     Comment: Daily    Sexual activity: Yes     Partners: Female   Other Topics Concern    None   Social History Narrative    None     Social Determinants of Health     Financial Resource Strain:     Difficulty of Paying Living Expenses: Not on file   Food Insecurity:     Worried About Running Out of Food in the Last Year: Not on file    Dave of Food in the Last Year: Not on file   Transportation Needs:     Lack of Transportation (Medical): Not on file    Lack of Transportation (Non-Medical): Not on file   Physical Activity:     Days of Exercise per Week: Not on file    Minutes of Exercise per Session: Not on file   Stress:     Feeling of Stress : Not on file   Social Connections:     Frequency of Communication with Friends and Family: Not on file    Frequency of Social Gatherings with Friends and Family: Not on file    Attends Sikhism Services: Not on file    Active Member of [address] [place] [address] [employer] or Organizations: Not on file    Attends Club or Organization Meetings: Not on file    Marital Status: Not on file   Intimate Partner Violence:     Fear of Current or Ex-Partner: Not on file    Emotionally Abused: Not on file    Physically Abused: Not on file    Sexually Abused: Not on file   Housing Stability:     Unable to Pay for Housing in the Last Year: Not on file    Number of [place] in the Last Year: Not on file    Unstable Housing in the Last Year: Not on file         PHYSICAL EXAM    (up to 7 for level 4, 8 or more for level 5)     ED Triage Vitals [05/12/22 1330]   BP Temp Temp Source Pulse Resp SpO2 Height Weight   (!) 154/94 97.8 °F (36.6 °C) Temporal 73 16 95 % 5' 11\" (1.803 m) 240 lb (108.9 kg)       Physical Exam   General appearance: Patient is awake alert interactive appropriate nontoxic in no acute distress.    Head is atraumatic normocephalic  Eyes pupils are equal and reactive sclera white conjunctive are pink  Oral pharyngeal cavity is pink with good moisture, no exudates or ulcerations no asymmetry, the airway is widely patent  Neck: Supple no meningeal signs no adenopathy no JVD  Heart: Regular rate and rhythm no gross murmurs rubs or clicks  Lungs: Breath sounds are clear with good air movement throughout no active wheezes rales or rhonchi no respiratory distress  Abdomen: Soft nontender with good bowel sounds no rebound rigidity or guarding no firm or pulsatile masses, no gross distention, femoral pulses full and symmetric  Back: Nontender to palpation no costovertebral angle tenderness  Skin: Warm and dry without rashes  Lower extremities: No edema or calf tenderness or asymmetry. DIAGNOSTIC RESULTS     LABS:  Labs Reviewed   BASIC METABOLIC PANEL - Abnormal; Notable for the following components:       Result Value    Glucose 108 (*)     All other components within normal limits   CBC WITH AUTO DIFFERENTIAL - Abnormal; Notable for the following components:    RBC 4.44 (*)     Hemoglobin 13.1 (*)     Hematocrit 37.9 (*)     All other components within normal limits   POCT GLUCOSE - Abnormal; Notable for the following components:    POC Glucose 111 (*)     All other components within normal limits   URINALYSIS WITH REFLEX TO CULTURE   Laboratory review: CBC BMP within normal limits urinalysis reveals no ketones     All other labs were within normal range or not returned as of this dictation. EMERGENCY DEPARTMENT COURSE and DIFFERENTIAL DIAGNOSIS/MDM:   Vitals:    Vitals:    05/12/22 1330   BP: (!) 154/94   Pulse: 73   Resp: 16   Temp: 97.8 °F (36.6 °C)   TempSrc: Temporal   SpO2: 95%   Weight: 240 lb (108.9 kg)   Height: 5' 11\" (1.803 m)     Treatment and course: Bedside blood sugar check at 111. Patient had an IV established screening blood work was sent normal saline 500 mL IV bolus was given. Patient resting comfortably asymptomatic throughout the ED course. FINAL IMPRESSION      1.  Hyperglycemia DISPOSITION/PLAN   DISPOSITION    Patient discharged home advised to increase fluids decrease carbohydrates in the diet. Continue metformin as directed check your blood sugars at least twice a day and record them for your doctor. Monitor closely and return if any change or worsening nausea vomiting pain weak or dizzy. And to follow-up with primary physician for repeat assessment in the next 2 to 3 days    PATIENT REFERRED TO:  Your own family doctor    In 2 days        DISCHARGE MEDICATIONS:  New Prescriptions    No medications on file     Controlled Substances Monitoring:     No flowsheet data found.     (Please note that portions of this note were completed with a voice recognition program.  Efforts were made to edit the dictations but occasionally words are mis-transcribed.)    Moon Goncalves DO (electronically signed)  Attending Emergency Physician            Moon Goncalves DO  05/12/22 3067

## 2022-09-25 ENCOUNTER — HOSPITAL ENCOUNTER (EMERGENCY)
Age: 47
Discharge: HOME | End: 2022-09-25
Payer: MEDICAID

## 2022-09-25 VITALS
RESPIRATION RATE: 18 BRPM | TEMPERATURE: 97.4 F | DIASTOLIC BLOOD PRESSURE: 77 MMHG | WEIGHT: 240 LBS | HEIGHT: 71 IN | HEART RATE: 97 BPM | BODY MASS INDEX: 33.6 KG/M2 | SYSTOLIC BLOOD PRESSURE: 128 MMHG | OXYGEN SATURATION: 97 %

## 2022-09-25 DIAGNOSIS — R11.0 NAUSEA: ICD-10-CM

## 2022-09-25 DIAGNOSIS — F10.10 ALCOHOL ABUSE: ICD-10-CM

## 2022-09-25 DIAGNOSIS — K29.20 ALCOHOLIC GASTRITIS WITHOUT BLEEDING, UNSPECIFIED CHRONICITY: Primary | ICD-10-CM

## 2022-09-25 LAB
ALBUMIN SERPL-MCNC: 4.9 G/DL (ref 3.5–4.6)
ALP BLD-CCNC: 141 U/L (ref 35–104)
ALT SERPL-CCNC: 70 U/L (ref 0–41)
AMYLASE: 31 U/L (ref 22–93)
ANION GAP SERPL CALCULATED.3IONS-SCNC: 28 MEQ/L (ref 9–15)
AST SERPL-CCNC: 50 U/L (ref 0–40)
BASOPHILS ABSOLUTE: 0.1 K/UL (ref 0–0.1)
BASOPHILS RELATIVE PERCENT: 0.7 % (ref 0.2–1.2)
BILIRUB SERPL-MCNC: 0.6 MG/DL (ref 0.2–0.7)
BUN BLDV-MCNC: 21 MG/DL (ref 6–20)
CALCIUM SERPL-MCNC: 9.4 MG/DL (ref 8.5–9.9)
CHLORIDE BLD-SCNC: 96 MEQ/L (ref 95–107)
CO2: 14 MEQ/L (ref 20–31)
CREAT SERPL-MCNC: 0.9 MG/DL (ref 0.7–1.2)
EOSINOPHILS ABSOLUTE: 0 K/UL (ref 0–0.5)
EOSINOPHILS RELATIVE PERCENT: 0.3 % (ref 0.8–7)
ETHANOL PERCENT: 0.09 G/DL
ETHANOL: 107 MG/DL (ref 0–0.08)
GFR AFRICAN AMERICAN: >60
GFR NON-AFRICAN AMERICAN: >60
GLOBULIN: 3.2 G/DL (ref 2.3–3.5)
GLUCOSE BLD-MCNC: 170 MG/DL (ref 70–99)
HCT VFR BLD CALC: 42.9 % (ref 42–52)
HEMOGLOBIN: 15.2 G/DL (ref 13.7–17.5)
IMMATURE GRANULOCYTES #: 0.1 K/UL
IMMATURE GRANULOCYTES %: 0.5 %
LIPASE: 22 U/L (ref 12–95)
LYMPHOCYTES ABSOLUTE: 2.5 K/UL (ref 1.3–3.6)
LYMPHOCYTES RELATIVE PERCENT: 23 %
MCH RBC QN AUTO: 29.5 PG (ref 25.7–32.2)
MCHC RBC AUTO-ENTMCNC: 35.4 % (ref 32.3–36.5)
MCV RBC AUTO: 83.1 FL (ref 79–92.2)
MONOCYTES ABSOLUTE: 0.6 K/UL (ref 0.3–0.8)
MONOCYTES RELATIVE PERCENT: 5.4 % (ref 5.3–12.2)
NEUTROPHILS ABSOLUTE: 7.7 K/UL (ref 1.8–5.4)
NEUTROPHILS RELATIVE PERCENT: 70.1 % (ref 34–67.9)
PDW BLD-RTO: 12.4 % (ref 11.6–14.4)
PLATELET # BLD: 355 K/UL (ref 163–337)
POTASSIUM REFLEX MAGNESIUM: 4.4 MEQ/L (ref 3.4–4.9)
RBC # BLD: 5.16 M/UL (ref 4.63–6.08)
SODIUM BLD-SCNC: 138 MEQ/L (ref 135–144)
TOTAL PROTEIN: 8.1 G/DL (ref 6.3–8)
TROPONIN: <0.01 NG/ML (ref 0–0.01)
WBC # BLD: 11 K/UL (ref 4.2–9)

## 2022-09-25 PROCEDURE — 82150 ASSAY OF AMYLASE: CPT

## 2022-09-25 PROCEDURE — 80053 COMPREHEN METABOLIC PANEL: CPT

## 2022-09-25 PROCEDURE — 6370000000 HC RX 637 (ALT 250 FOR IP)

## 2022-09-25 PROCEDURE — 6360000002 HC RX W HCPCS

## 2022-09-25 PROCEDURE — 96374 THER/PROPH/DIAG INJ IV PUSH: CPT

## 2022-09-25 PROCEDURE — 96375 TX/PRO/DX INJ NEW DRUG ADDON: CPT

## 2022-09-25 PROCEDURE — 83690 ASSAY OF LIPASE: CPT

## 2022-09-25 PROCEDURE — 85025 COMPLETE CBC W/AUTO DIFF WBC: CPT

## 2022-09-25 PROCEDURE — 36415 COLL VENOUS BLD VENIPUNCTURE: CPT

## 2022-09-25 PROCEDURE — 2580000003 HC RX 258

## 2022-09-25 PROCEDURE — 99284 EMERGENCY DEPT VISIT MOD MDM: CPT

## 2022-09-25 PROCEDURE — 93005 ELECTROCARDIOGRAM TRACING: CPT

## 2022-09-25 PROCEDURE — 82077 ASSAY SPEC XCP UR&BREATH IA: CPT

## 2022-09-25 PROCEDURE — 84484 ASSAY OF TROPONIN QUANT: CPT

## 2022-09-25 PROCEDURE — 2500000003 HC RX 250 WO HCPCS

## 2022-09-25 RX ORDER — CHLORDIAZEPOXIDE HYDROCHLORIDE 10 MG/1
10 CAPSULE, GELATIN COATED ORAL 3 TIMES DAILY PRN
Qty: 15 CAPSULE | Refills: 0 | Status: SHIPPED | OUTPATIENT
Start: 2022-09-25 | End: 2022-09-30

## 2022-09-25 RX ORDER — 0.9 % SODIUM CHLORIDE 0.9 %
1000 INTRAVENOUS SOLUTION INTRAVENOUS ONCE
Status: COMPLETED | OUTPATIENT
Start: 2022-09-25 | End: 2022-09-25

## 2022-09-25 RX ORDER — ONDANSETRON 2 MG/ML
4 INJECTION INTRAMUSCULAR; INTRAVENOUS ONCE
Status: COMPLETED | OUTPATIENT
Start: 2022-09-25 | End: 2022-09-25

## 2022-09-25 RX ORDER — PANTOPRAZOLE SODIUM 20 MG/1
20 TABLET, DELAYED RELEASE ORAL DAILY
Qty: 30 TABLET | Refills: 0 | Status: SHIPPED | OUTPATIENT
Start: 2022-09-25

## 2022-09-25 RX ORDER — ONDANSETRON 4 MG/1
4 TABLET, ORALLY DISINTEGRATING ORAL EVERY 8 HOURS PRN
Qty: 12 TABLET | Refills: 0 | Status: SHIPPED | OUTPATIENT
Start: 2022-09-25

## 2022-09-25 RX ORDER — CHLORDIAZEPOXIDE HYDROCHLORIDE 5 MG/1
5 CAPSULE, GELATIN COATED ORAL ONCE
Status: COMPLETED | OUTPATIENT
Start: 2022-09-25 | End: 2022-09-25

## 2022-09-25 RX ADMIN — CHLORDIAZEPOXIDE HYDROCHLORIDE 5 MG: 5 CAPSULE ORAL at 15:32

## 2022-09-25 RX ADMIN — ONDANSETRON 4 MG: 2 INJECTION INTRAMUSCULAR; INTRAVENOUS at 14:11

## 2022-09-25 RX ADMIN — FAMOTIDINE 20 MG: 10 INJECTION, SOLUTION INTRAVENOUS at 14:12

## 2022-09-25 RX ADMIN — SODIUM CHLORIDE 1000 ML: 9 INJECTION, SOLUTION INTRAVENOUS at 14:11

## 2022-09-25 ASSESSMENT — PAIN DESCRIPTION - LOCATION: LOCATION: ABDOMEN

## 2022-09-25 ASSESSMENT — PAIN SCALES - GENERAL: PAINLEVEL_OUTOF10: 8

## 2022-09-25 ASSESSMENT — PAIN DESCRIPTION - PAIN TYPE: TYPE: ACUTE PAIN

## 2022-09-25 ASSESSMENT — PAIN DESCRIPTION - DESCRIPTORS: DESCRIPTORS: ACHING

## 2022-09-25 ASSESSMENT — PAIN - FUNCTIONAL ASSESSMENT: PAIN_FUNCTIONAL_ASSESSMENT: 0-10

## 2022-09-25 ASSESSMENT — PAIN DESCRIPTION - FREQUENCY: FREQUENCY: CONTINUOUS

## 2022-09-25 NOTE — ED TRIAGE NOTES
Pt arrives to ED, from home, via his son's personal vehicle. Pt presents with mid epigastric pain and \"vomiting blood. \"  Pt admits to going on a \"four day urbano\" with ETOH. Pt states his last drink was, today, \"a few hours ago. \"  Pt states his last drinks were \"shots of Vodka. \"  Pt admits he had been sober \"from alcohol for awhile, but I still smoked weed. \"

## 2022-09-25 NOTE — ED PROVIDER NOTES
[address] ED  eMERGENCYdEPARTMENT eNCOUnter      Pt Name: Elsie Delaney  MRN: [id number]  [place] 1975of evaluation: 9/25/2022  Provider:KEARA Obrien - NOLVIA    CHIEF COMPLAINT       Chief Complaint   Patient presents with    Alcohol Problem     Last drink was \"shots of Vodka, a few hour ago. \"           HISTORY OF PRESENT ILLNESS  (Location/Symptom, Timing/Onset, Context/Setting, Quality, Duration, Modifying Factors, Severity.)   Elsie Delaney is a 50 y.o. male with a hx of Depression, Sleep apnea, DM, and ETOH abuse who presents to the emergency department with complaints of nausea and emesis. Patient  states he has been drinking alcohol frequently the last 4 days without eating much food. He had 3 episodes of emesis this morning after drinking shots of alcohol and some epigastric discomfort. Patient states he has been feeling slightly depressed lately so he went back to drinking ETOH denies any SI/HI ideations. He had his son drive him to ED. Denies CP, SOB, fever, chills. HPI    Nursing Notes were reviewed and I agree. REVIEW OF SYSTEMS    (2-9 systems for level 4, 10 or more for level 5)     Review of Systems   Constitutional:  Negative for activity change, chills and fever. HENT:  Negative for ear pain and sore throat. Eyes:  Negative for visual disturbance. Respiratory:  Negative for cough and shortness of breath. Cardiovascular:  Negative for chest pain, palpitations and leg swelling. Gastrointestinal:  Positive for abdominal pain, nausea and vomiting. Negative for diarrhea. Genitourinary:  Negative for dysuria. Musculoskeletal:  Negative for back pain. Skin:  Negative for rash. Neurological:  Negative for dizziness and weakness. as noted above the remainder of the review of systems was reviewed and negative.        PAST MEDICAL HISTORY     Past Medical History:   Diagnosis Date    Depression     Diabetes mellitus ([facility] [address]     ETOH abuse     Sleep apnea SURGICAL HISTORY     History reviewed. No pertinent surgical history. CURRENT MEDICATIONS       Previous Medications    ALCOHOL SWABS 70 % PADS    1 box by Does not apply route daily    BLOOD GLUCOSE MONITORING SUPPL (ACCU-CHEK COMPACT CARE KIT) KIT    1 kit by Does not apply route daily    BLOOD GLUCOSE MONITORING SUPPL (ONETOUCH VERIO) W/DEVICE KIT    As directed    BLOOD GLUCOSE TEST STRIPS (ONE TOUCH ULTRA TEST) STRIP    Test TID Dx E11.65    BLOOD GLUCOSE TEST STRIPS (ONETOUCH VERIO) STRIP    1 each by In Vitro route daily As needed. INSULIN PEN NEEDLE (PEN NEEDLES) 33G X 4 MM MISC    Use 1 daily with insulin    METFORMIN (GLUCOPHAGE) 500 MG TABLET    Take 1 tablet by mouth 2 times daily (with meals)    ONETOUCH DELICA LANCETS 56N MISC    bid    SERTRALINE (ZOLOFT) 50 MG TABLET    Take 50 mg by mouth daily       ALLERGIES     Patient has no known allergies. HISTORY     History reviewed. No pertinent family history. SOCIAL HISTORY       Social History     Socioeconomic History    Marital status:      Spouse name: None    Number of children: None    Years of education: None    Highest education level: None   Tobacco Use    Smoking status: Former     Types: Cigarettes     Quit date:      Years since quittin.7    Smokeless tobacco: Never   Vaping Use    Vaping Use: Never used   Substance and Sexual Activity    Alcohol use: Not Currently     Comment: recovering ETOH last drink in August    Drug use: No     Comment: Daily    Sexual activity: Yes     Partners: Female       SCREENINGS           PHYSICAL EXAM    (up to 7 forlevel 4, 8 or more for level 5)     ED Triage Vitals   BP Temp Temp src Pulse Resp SpO2 Height Weight   -- -- -- -- -- -- -- --       Physical Exam  Vitals and nursing note reviewed. Constitutional:       Appearance: He is well-developed. HENT:      Head: Normocephalic.       Right Ear: External ear normal.      Left Ear: External ear normal.      Nose: Nose normal.      Mouth/Throat:      Mouth: Mucous membranes are moist.   Eyes:      Conjunctiva/sclera: Conjunctivae normal.      Pupils: Pupils are equal, round, and reactive to light. Cardiovascular:      Rate and Rhythm: Normal rate and regular rhythm. Pulses: Normal pulses. Heart sounds: Normal heart sounds. No murmur heard. No friction rub. Pulmonary:      Effort: Pulmonary effort is normal.      Breath sounds: Normal breath sounds. No wheezing or rhonchi. Abdominal:      General: Bowel sounds are normal. There is no distension. Palpations: Abdomen is soft. Tenderness: There is no abdominal tenderness. Musculoskeletal:         General: Normal range of motion. Cervical back: Normal range of motion and neck supple. Skin:     General: Skin is warm and dry. Capillary Refill: Capillary refill takes less than 2 seconds. Neurological:      General: No focal deficit present. Mental Status: He is alert and oriented to person, place, and time. Mental status is at baseline. Psychiatric:         Mood and Affect: Mood normal.         Behavior: Behavior normal.         Thought Content:  Thought content normal.         Judgment: Judgment normal.         DIAGNOSTIC RESULTS     RADIOLOGY:   Non-plain film images such as CT, Ultrasound and MRI are read by the radiologist. Plain radiographic images are visualized and preliminarilyinterpreted by KEARA Montoya CNP with the below findings:        Interpretation per the Radiologist below, if available at the time of this note:    No orders to display       LABS:  Labs Reviewed   CBC WITH AUTO DIFFERENTIAL - Abnormal; Notable for the following components:       Result Value    WBC 11.0 (*)     Platelets 770 (*)     Neutrophils % 70.1 (*)     Eosinophils % 0.3 (*)     Neutrophils Absolute 7.7 (*)     All other components within normal limits   COMPREHENSIVE METABOLIC PANEL W/ REFLEX TO MG FOR LOW K - Abnormal; Notable for the following components:    CO2 14 (*)     Anion Gap 28 (*)     Glucose 170 (*)     BUN 21 (*)     Total Protein 8.1 (*)     Albumin 4.9 (*)     Alkaline Phosphatase 141 (*)     ALT 70 (*)     AST 50 (*)     All other components within normal limits   AMYLASE   LIPASE   ETHANOL   TROPONIN       All other labs were within normal range or not returnedas of this dictation. EMERGENCYDEPARTMENT COURSE and DIFFERENTIAL DIAGNOSIS/MDM:   Vitals:    Vitals:    09/25/22 1357 09/25/22 1433 09/25/22 1503 09/25/22 1530   BP: 126/89 113/86 (!) 123/105 128/77   Pulse: (!) 110 95 89 97   Resp: 17 18 18 18   Temp: 97.4 °F (36.3 °C)      TempSrc: Oral      SpO2: 96% 96% 96% 97%   Weight: 240 lb (108.9 kg)      Height: 5' 11\" (1.803 m)          REASSESSMENT            MDM  RW 50year old male presents to ED with nausea, emesis, epigastric discomfort. Patient afebrile and hemodynamically stable. Medicated with NS IVF bolus, Zofran IV, and pepcid IV. EKG shows sinus tachycardia with PVC with , normal axis, normal intervals, no ST changes. Labs show ethanol level 0.094, Anion GAP 28, CO2 14, Glucose 170. Discussed labs with attending Dr. Fam Blankenship states patient has compensating metabolic acidosis r/t recent increased alcohol intake/dehydration. Patient tolerating oral fluids denies any abdominal pain/emesis. State he feels anxious and concerned about tremors/withdraw symptoms Librium po given. Offer to set patient up with [facility] and call them into the ER for consultation, he denied need and states he has AA contacts. Patient is requesting PRN medication for withdraw symptoms, discussed with Attending who advised to DC patient home with ETOH gastritis dx on Librium po. Patient states he has used Librium in the past PRN for treatment. Suspect patient has Alcohol induced gastritis, with alcohol abuse. He refused need for treatment or referral at this time. Rx Protonix, Zofran, and Librium given.  Patient states he feels much better refuses any further treatment. Discussed Rx, Dx, Tx, follow up, and reasons to return to ED for further treatment with patient and son both states understanding and denies any questions. Son to transport patient home. KEARA Regan CNP    PROCEDURES:    Procedures      FINAL IMPRESSION      1. Alcoholic gastritis without bleeding, unspecified chronicity Stable   2. Nausea Stable   3. Alcohol abuse Stable         DISPOSITION/PLAN   DISPOSITION Discharge - Pending Orders Complete 09/25/2022 04:21:33 PM      PATIENT REFERRED TO:  [facility]  [address] [facility]  [phone]    If symptoms worsen    DISCHARGE MEDICATIONS:  New Prescriptions    CHLORDIAZEPOXIDE (LIBRIUM) 10 MG CAPSULE    Take 1 capsule by mouth 3 times daily as needed for Anxiety for up to 5 days.     ONDANSETRON (ZOFRAN ODT) 4 MG DISINTEGRATING TABLET    Take 1 tablet by mouth every 8 hours as needed for Nausea or Vomiting    PANTOPRAZOLE (PROTONIX) 20 MG TABLET    Take 1 tablet by mouth daily       (Please note that portions of this note were completed with a voice recognition program.  Efforts were made to edit the dictations but occasionally words are mis-transcribed.)    KEAAR Regan - KEARA Byrd CNP  09/25/22 5751

## 2022-09-25 NOTE — ED PROVIDER NOTES
[address] ED  eMERGENCYdEPARTMENT eNCOUnter      Pt Name: Dylan Whelan  MRN: [id number]  [place] 1975of evaluation: 9/25/2022  Provider:KEARA Sanabria CNP    CHIEF COMPLAINT     No chief complaint on file. HISTORY OF PRESENT ILLNESS  (Location/Symptom, Timing/Onset, Context/Setting, Quality, Duration, Modifying Factors, Severity.)   Dylan Whelan is a 50 y.o. male with a hx of Depression, Sleep apnea, DM, and ETOH abuse who presents to the emergency department with complaints of nausea and emesis. Patient  states he has been drinking alcohol frequently the last 4 days without eating much food. He had 3 episodes of emesis this morning after drinking shots of alcohol and some epigastric discomfort. Patient states he has been feeling slightly depressed lately so he went back to drinking ETOH denies any SI/HI ideations. He had his son drive him to ED. Denies CP, SOB, fever, chills. HPI    Nursing Notes were reviewed and I agree. REVIEW OF SYSTEMS    (2-9 systems for level 4, 10 or more for level 5)     Review of Systems   Constitutional:  Negative for activity change, chills and fever. HENT:  Negative for ear pain and sore throat. Eyes:  Negative for visual disturbance. Respiratory:  Negative for cough and shortness of breath. Cardiovascular:  Negative for chest pain, palpitations and leg swelling. Gastrointestinal:  Positive for abdominal pain, nausea and vomiting. Negative for diarrhea. Genitourinary:  Negative for dysuria. Musculoskeletal:  Negative for back pain. Skin:  Negative for rash. Neurological:  Negative for dizziness and weakness. as noted above the remainder of the review of systems was reviewed and negative. PAST MEDICAL HISTORY     Past Medical History:   Diagnosis Date    Depression     Diabetes mellitus ([facility] [address]     ETOH abuse     Sleep apnea          SURGICAL HISTORY     No past surgical history on file.       CURRENT MEDICATIONS Normal rate and regular rhythm. Pulses: Normal pulses. Heart sounds: Normal heart sounds. No murmur heard. No friction rub. Pulmonary:      Effort: Pulmonary effort is normal.      Breath sounds: Normal breath sounds. No wheezing or rhonchi. Abdominal:      General: Bowel sounds are normal. There is no distension. Palpations: Abdomen is soft. Tenderness: There is no abdominal tenderness. Musculoskeletal:         General: Normal range of motion. Cervical back: Normal range of motion and neck supple. Skin:     General: Skin is warm and dry. Capillary Refill: Capillary refill takes less than 2 seconds. Neurological:      General: No focal deficit present. Mental Status: He is alert and oriented to person, place, and time. Mental status is at baseline. Psychiatric:         Mood and Affect: Mood normal.         Behavior: Behavior normal.         Thought Content: Thought content normal.         Judgment: Judgment normal.         DIAGNOSTIC RESULTS     RADIOLOGY:   Non-plain film images such as CT, Ultrasound and MRI are read by the radiologist. Plain radiographic images are visualized and preliminarilyinterpreted by KEARA Romo CNP with the below findings:    ***    Interpretation per the Radiologist below, if available at the time of this note:    No orders to display       LABS:  Labs Reviewed - No data to display    All other labs were within normal range or not returnedas of this dictation. EMERGENCYDEPARTMENT COURSE and DIFFERENTIAL DIAGNOSIS/MDM:   Vitals: There were no vitals filed for this visit. REASSESSMENT        ***    MDM  RW 50year old male presents to ED with nausea, emesis, epigastric discomfort. Patient afebrile and hemodynamically stable. Medicated with NS IVF bolus, Zofran IV, and pepcid IV. EKG shows NSR with HR ***, normal axis, normal intervals, no ST changes.         PROCEDURES:    Procedures      FINAL IMPRESSION    No diagnosis found.      DISPOSITION/PLAN   DISPOSITION        PATIENT REFERRED TO:  No follow-up provider specified.     DISCHARGE MEDICATIONS:  New Prescriptions    No medications on file       (Please note that portions of this note were completed with a voice recognition program.  Efforts were made to edit the dictations but occasionally words are mis-transcribed.)    KEARA Osullivan - CNP

## 2022-09-25 NOTE — ED NOTES
Pt is given d/c instructions and three scripts. Pt voiced understanding of d/c instructions without further questions.       Gemma Donahue RN  09/25/22 1981

## 2022-09-26 LAB
EKG ATRIAL RATE: 109 BPM
EKG P AXIS: 48 DEGREES
EKG P-R INTERVAL: 150 MS
EKG Q-T INTERVAL: 354 MS
EKG QRS DURATION: 90 MS
EKG QTC CALCULATION (BAZETT): 476 MS
EKG R AXIS: 47 DEGREES
EKG T AXIS: 18 DEGREES
EKG VENTRICULAR RATE: 109 BPM

## 2022-09-26 PROCEDURE — 93010 ELECTROCARDIOGRAM REPORT: CPT | Performed by: INTERNAL MEDICINE

## 2022-09-28 ENCOUNTER — HOSPITAL ENCOUNTER (EMERGENCY)
Age: 47
Discharge: HOME | End: 2022-09-28
Attending: EMERGENCY MEDICINE
Payer: MEDICAID

## 2022-09-28 VITALS
SYSTOLIC BLOOD PRESSURE: 121 MMHG | RESPIRATION RATE: 18 BRPM | HEART RATE: 85 BPM | OXYGEN SATURATION: 97 % | BODY MASS INDEX: 32.2 KG/M2 | DIASTOLIC BLOOD PRESSURE: 89 MMHG | WEIGHT: 230 LBS | TEMPERATURE: 97.9 F | HEIGHT: 71 IN

## 2022-09-28 DIAGNOSIS — F41.9 ANXIETY: Primary | ICD-10-CM

## 2022-09-28 DIAGNOSIS — Z76.0 ENCOUNTER FOR MEDICATION REFILL: ICD-10-CM

## 2022-09-28 DIAGNOSIS — F10.20 CHRONIC ALCOHOLISM (HCC): ICD-10-CM

## 2022-09-28 LAB
CHP ED QC CHECK: NORMAL
GLUCOSE BLD-MCNC: 179 MG/DL

## 2022-09-28 PROCEDURE — 99283 EMERGENCY DEPT VISIT LOW MDM: CPT

## 2022-09-28 PROCEDURE — 6370000000 HC RX 637 (ALT 250 FOR IP): Performed by: EMERGENCY MEDICINE

## 2022-09-28 RX ORDER — HYDROXYZINE HYDROCHLORIDE 50 MG/ML
50 INJECTION, SOLUTION INTRAMUSCULAR EVERY 6 HOURS PRN
Status: DISCONTINUED | OUTPATIENT
Start: 2022-09-28 | End: 2022-09-28 | Stop reason: HOSPADM

## 2022-09-28 RX ORDER — HYDROXYZINE HYDROCHLORIDE 25 MG/1
50 TABLET, FILM COATED ORAL ONCE
Status: COMPLETED | OUTPATIENT
Start: 2022-09-28 | End: 2022-09-28

## 2022-09-28 RX ORDER — HYDROXYZINE HYDROCHLORIDE 25 MG/1
25 TABLET, FILM COATED ORAL EVERY 8 HOURS PRN
Qty: 30 TABLET | Refills: 0 | Status: SHIPPED | OUTPATIENT
Start: 2022-09-28 | End: 2022-10-08

## 2022-09-28 RX ADMIN — HYDROXYZINE HYDROCHLORIDE 50 MG: 25 TABLET, FILM COATED ORAL at 17:17

## 2022-09-28 ASSESSMENT — ENCOUNTER SYMPTOMS
WHEEZING: 0
CONSTIPATION: 0
BACK PAIN: 0
DIARRHEA: 0
CHOKING: 0
VOICE CHANGE: 0
SHORTNESS OF BREATH: 0
SINUS PRESSURE: 0
COUGH: 0
FACIAL SWELLING: 0
SORE THROAT: 0
EYE DISCHARGE: 0
EYE REDNESS: 0
CHEST TIGHTNESS: 0
STRIDOR: 0
BLOOD IN STOOL: 0
TROUBLE SWALLOWING: 0
VOMITING: 0
ABDOMINAL PAIN: 0
EYE PAIN: 0

## 2022-09-28 ASSESSMENT — PAIN SCALES - GENERAL
PAINLEVEL_OUTOF10: 0
PAINLEVEL_OUTOF10: 0

## 2022-09-28 ASSESSMENT — PAIN - FUNCTIONAL ASSESSMENT: PAIN_FUNCTIONAL_ASSESSMENT: 0-10

## 2022-09-28 ASSESSMENT — LIFESTYLE VARIABLES
HOW OFTEN DO YOU HAVE A DRINK CONTAINING ALCOHOL: 4 OR MORE TIMES A WEEK
HOW MANY STANDARD DRINKS CONTAINING ALCOHOL DO YOU HAVE ON A TYPICAL DAY: 10 OR MORE

## 2022-09-28 NOTE — ED TRIAGE NOTES
Pt said he drinks 2 gallons of vodka a day.   Pt said that he quit drinking Sunday and received Librium for withdrawal.  Pt said he is having increased anxiety and withdrawal.

## 2022-09-28 NOTE — ED PROVIDER NOTES
[address] [place] [address] ED  eMERGENCY dEPARTMENT eNCOUnter      Pt Name: Stas Dover  MRN: [id number]  [place] 1975  Date of evaluation: 9/28/2022  Provider: Amber Richardson MD    [address] [place] [address]       Chief Complaint   Patient presents with    Anxiety     Pt said he quit drinking alcohol on Sunday and is feeling very anxious. HISTORY OF PRESENT ILLNESS   (Location/Symptom, Timing/Onset,Context/Setting, Quality, Duration, Modifying Factors, Severity)  Note limiting factors. Stas Dover is a 50 y.o. male who presents to the emergency department patient accompanied by his daughter here for further requesting less sedative medication than he is using at this time patient has taken Vistaril with some good results in the past at the patient he is trying to quit and last drink was Sunday and and given Librium for 5 days and he has 1 pill left but he would like to change medication patient does take metformin for his diabetes has no fever no chills no nausea no vomiting his request is to change medication to less sedative    HPI    NursingNotes were reviewed. REVIEW OF SYSTEMS    (2-9 systems for level 4, 10 or more for level 5)     Review of Systems   Constitutional: Negative. Negative for activity change and fever. HENT:  Negative for congestion, drooling, facial swelling, mouth sores, nosebleeds, sinus pressure, sore throat, trouble swallowing and voice change. Eyes:  Negative for pain, discharge, redness and visual disturbance. Respiratory:  Negative for cough, choking, chest tightness, shortness of breath, wheezing and stridor. Cardiovascular:  Negative for chest pain, palpitations and leg swelling. Gastrointestinal:  Negative for abdominal pain, blood in stool, constipation, diarrhea and vomiting. Endocrine: Negative for cold intolerance, polyphagia and polyuria. Genitourinary:  Negative for dysuria, flank pain, frequency, genital sores and urgency.    Musculoskeletal:  Negative for back pain, joint swelling, neck pain and neck stiffness. Skin:  Negative for pallor and rash. Neurological:  Negative for tremors, seizures, syncope, weakness, numbness and headaches. Hematological:  Negative for adenopathy. Does not bruise/bleed easily. Psychiatric/Behavioral:  Negative for agitation, behavioral problems, hallucinations and sleep disturbance. The patient is nervous/anxious. The patient is not hyperactive. All other systems reviewed and are negative. Except as noted above the remainder of the review of systems was reviewed and negative. PAST MEDICAL HISTORY     Past Medical History:   Diagnosis Date    Depression     Diabetes mellitus ([facility] [address]     ETOH abuse     Sleep apnea          SURGICALHISTORY     History reviewed. No pertinent surgical history. CURRENT MEDICATIONS       Previous Medications    ALCOHOL SWABS 70 % PADS    1 box by Does not apply route daily    BLOOD GLUCOSE MONITORING SUPPL (ACCU-CHEK COMPACT CARE KIT) KIT    1 kit by Does not apply route daily    BLOOD GLUCOSE MONITORING SUPPL (ONETOUCH VERIO) W/DEVICE KIT    As directed    BLOOD GLUCOSE TEST STRIPS (ONE TOUCH ULTRA TEST) STRIP    Test TID Dx E11.65    BLOOD GLUCOSE TEST STRIPS (ONETOUCH VERIO) STRIP    1 each by In Vitro route daily As needed. CHLORDIAZEPOXIDE (LIBRIUM) 10 MG CAPSULE    Take 1 capsule by mouth 3 times daily as needed for Anxiety for up to 5 days. INSULIN PEN NEEDLE (PEN NEEDLES) 33G X 4 MM MISC    Use 1 daily with insulin    METFORMIN (GLUCOPHAGE) 500 MG TABLET    Take 1 tablet by mouth 2 times daily (with meals)    ONDANSETRON (ZOFRAN ODT) 4 MG DISINTEGRATING TABLET    Take 1 tablet by mouth every 8 hours as needed for Nausea or Vomiting    ONETOUCH DELICA LANCETS 15D MISC    bid    PANTOPRAZOLE (PROTONIX) 20 MG TABLET    Take 1 tablet by mouth daily    SERTRALINE (ZOLOFT) 50 MG TABLET    Take 50 mg by mouth daily       ALLERGIES     Patient has no known allergies.     FAMILY HISTORY     History reviewed. No pertinent family history. SOCIAL HISTORY       Social History     Socioeconomic History    Marital status:      Spouse name: None    Number of children: None    Years of education: None    Highest education level: None   Tobacco Use    Smoking status: Former     Types: Cigarettes     Quit date:      Years since quittin.7    Smokeless tobacco: Never   Vaping Use    Vaping Use: Never used   Substance and Sexual Activity    Alcohol use: Not Currently     Comment: pt quit drinking on     Drug use: No     Comment: Daily    Sexual activity: Yes     Partners: Female       SCREENINGS    [place] Coma Scale  Eye Opening: Spontaneous  Best Verbal Response: Oriented  Best Motor Response: Obeys commands  [place] Coma Scale Score: 15 @FLOW([id number])@      PHYSICAL EXAM    (up to 7 for level 4, 8 or more for level 5)     ED Triage Vitals [22 1635]   BP Temp Temp src Heart Rate Resp SpO2 Height Weight   121/89 97.9 °F (36.6 °C) -- 85 18 97 % 5' 11\" (1.803 m) 230 lb (104.3 kg)       Physical Exam  Vitals and nursing note reviewed. Constitutional:       General: He is not in acute distress. Appearance: Normal appearance. He is well-developed. He is obese. He is not ill-appearing or diaphoretic. Comments: Cooperative patient nontoxic appearance no shakiness noted at the present time daughter is sitting next to him   HENT:      Head: Normocephalic and atraumatic. Right Ear: Tympanic membrane, ear canal and external ear normal.      Left Ear: Tympanic membrane, ear canal and external ear normal.      Nose: Nose normal. No congestion or rhinorrhea. Mouth/Throat:      Mouth: Mucous membranes are moist.      Pharynx: No oropharyngeal exudate or posterior oropharyngeal erythema. Eyes:      General: No scleral icterus. Right eye: No discharge. Left eye: No discharge. Extraocular Movements: Extraocular movements intact.       Pupils: Pupils are equal, round, and reactive to light. Neck:      Vascular: No carotid bruit. Cardiovascular:      Rate and Rhythm: Normal rate and regular rhythm. Pulses: Normal pulses. Heart sounds: Normal heart sounds. No murmur heard. No gallop. Pulmonary:      Effort: No respiratory distress. Breath sounds: Normal breath sounds. No stridor. No wheezing or rhonchi. Chest:      Chest wall: No tenderness. Abdominal:      General: Bowel sounds are normal.      Palpations: Abdomen is soft. There is no mass. Tenderness: There is no rebound. Musculoskeletal:         General: No swelling, tenderness or deformity. Normal range of motion. Cervical back: Neck supple. No rigidity or tenderness. Right lower leg: No edema. Lymphadenopathy:      Cervical: No cervical adenopathy. Skin:     General: Skin is warm. Capillary Refill: Capillary refill takes less than 2 seconds. Coloration: Skin is not jaundiced. Findings: No bruising, erythema, lesion or rash. Neurological:      Mental Status: He is alert and oriented to person, place, and time. Cranial Nerves: No cranial nerve deficit. Sensory: No sensory deficit. Motor: No weakness or abnormal muscle tone. Coordination: Coordination normal.      Gait: Gait normal.      Deep Tendon Reflexes: Reflexes normal.   Psychiatric:         Behavior: Behavior normal.         Thought Content:  Thought content normal.       DIAGNOSTIC RESULTS     EKG: All EKG's are interpreted by the Emergency Department Physician who either signs or Co-signsthis chart in the absence of a cardiologist.        RADIOLOGY:   Non-plain filmimages such as CT, Ultrasound and MRI are read by the radiologist. Plain radiographic images are visualized and preliminarily interpreted by the emergency physician with the below findings:        Interpretation per the Radiologist below, if available at the time ofthis note:    No orders to display ED BEDSIDE ULTRASOUND:   Performed by ED Physician - none    LABS:  Labs Reviewed   POCT GLUCOSE - Normal       All other labs were within normal range or not returned as of this dictation. EMERGENCY DEPARTMENT COURSE and DIFFERENTIAL DIAGNOSIS/MDM:   Vitals:    Vitals:    09/28/22 1635 09/28/22 1638   BP: 121/89 121/89   Pulse: 85 85   Resp: 18    Temp: 97.9 °F (36.6 °C)    SpO2: 97%    Weight: 230 lb (104.3 kg)    Height: 5' 11\" (1.803 m)            MDM      CRITICAL CARE TIME   Total Critical Care time was  minutes, excluding separately reportableprocedures. There was a high probability of clinicallysignificant/life threatening deterioration in the patient's condition which required my urgent intervention. ULTS:  None    PROCEDURES:  Unless otherwise noted below, none     Procedures    FINAL IMPRESSION      1. Anxiety    2. Encounter for medication refill    3.  Chronic alcoholism [facility])          DISPOSITION/PLAN   DISPOSITION        PATIENT REFERRED TO:  THE [facility] [place] [address] [phone]    In 1 week      DISCHARGE MEDICATIONS:  New Prescriptions    HYDROXYZINE HCL (ATARAX) 25 MG TABLET    Take 1 tablet by mouth every 8 hours as needed for Anxiety          (Please note that portions of this note were completed with a voice recognition program.  Efforts were made to edit the dictations but occasionally words are mis-transcribed.)    Caitlin French MD (electronically signed)  Attending Emergency Physician        Caitlin French MD  09/28/22 [id number]

## 2022-10-12 ENCOUNTER — HOSPITAL ENCOUNTER (EMERGENCY)
Age: 47
Discharge: HOME | End: 2022-10-12
Payer: MEDICAID

## 2022-10-12 VITALS
HEIGHT: 71 IN | DIASTOLIC BLOOD PRESSURE: 90 MMHG | SYSTOLIC BLOOD PRESSURE: 141 MMHG | HEART RATE: 80 BPM | RESPIRATION RATE: 18 BRPM | OXYGEN SATURATION: 98 % | TEMPERATURE: 97.6 F | WEIGHT: 227 LBS | BODY MASS INDEX: 31.78 KG/M2

## 2022-10-12 DIAGNOSIS — F41.9 ANXIETY: Primary | ICD-10-CM

## 2022-10-12 PROCEDURE — 99283 EMERGENCY DEPT VISIT LOW MDM: CPT

## 2022-10-12 PROCEDURE — 93005 ELECTROCARDIOGRAM TRACING: CPT

## 2022-10-12 RX ORDER — HYDROXYZINE HYDROCHLORIDE 25 MG/1
25 TABLET, FILM COATED ORAL EVERY 8 HOURS PRN
Qty: 30 TABLET | Refills: 0 | Status: SHIPPED | OUTPATIENT
Start: 2022-10-12 | End: 2022-10-22

## 2022-10-12 ASSESSMENT — ENCOUNTER SYMPTOMS
SHORTNESS OF BREATH: 0
DIARRHEA: 0
ABDOMINAL PAIN: 0
SORE THROAT: 0
BACK PAIN: 0
VOMITING: 0
NAUSEA: 0
COUGH: 0

## 2022-10-12 NOTE — ED NOTES
Patient states he does not feel he needs a complete cardiac workup at this time. He denies any chest pain or SOB.      Babs Christine RN  10/12/22 [id number]

## 2022-10-12 NOTE — ED PROVIDER NOTES
[facility] ED  eMERGENCYdEPARTMENT eNCOUnter      Pt Name: Virgilio Gordillo  MRN: [id number]  [place] 1975of evaluation: 10/12/2022  Provider:KEARA Saunders - CNP    CHIEF COMPLAINT       Chief Complaint   Patient presents with    Anxiety         HISTORY OF PRESENT ILLNESS  (Location/Symptom, Timing/Onset, Context/Setting, Quality, Duration, Modifying Factors, Severity.)   Virgilio Gordillo is a 50 y.o. male hx of anxiety, ETOH abuse, DM, who presents to the emergency department for anxiety. Patient was seen by [facility] today and states he was told to come to ED to have EKG done as they would not prescribe atarax. Patient denies any CP, SOB, fever, chills, recent work up, ETOH use. He is requesting EKG and medication refill. He refuses cardiac work up at this time. [facility]    Nursing Notes were reviewed and I agree. REVIEW OF SYSTEMS    (2-9 systems for level 4, 10 or more for level 5)     Review of Systems   Constitutional:  Negative for activity change, chills and fever. HENT:  Negative for ear pain and sore throat. Eyes:  Negative for visual disturbance. Respiratory:  Negative for cough and shortness of breath. Cardiovascular:  Negative for chest pain, palpitations and leg swelling. Gastrointestinal:  Negative for abdominal pain, diarrhea, nausea and vomiting. Genitourinary:  Negative for dysuria. Musculoskeletal:  Negative for back pain. Skin:  Negative for rash. Neurological:  Negative for dizziness and weakness. Psychiatric/Behavioral:  Positive for agitation (anxiety). as noted above the remainder of the review of systems was reviewed and negative. PAST MEDICAL HISTORY     Past Medical History:   Diagnosis Date    Depression     Diabetes mellitus ([facility] [address]     ETOH abuse     Sleep apnea          SURGICAL HISTORY     History reviewed. No pertinent surgical history.       CURRENT MEDICATIONS       Previous Medications    ALCOHOL SWABS 70 % PADS    1 box by Does not apply route daily    BLOOD GLUCOSE MONITORING SUPPL (ACCU-CHEK COMPACT CARE KIT) KIT    1 kit by Does not apply route daily    BLOOD GLUCOSE MONITORING SUPPL (ONETOUCH VERIO) W/DEVICE KIT    As directed    BLOOD GLUCOSE TEST STRIPS (ONE TOUCH ULTRA TEST) STRIP    Test TID Dx E11.65    BLOOD GLUCOSE TEST STRIPS (ONETOUCH VERIO) STRIP    1 each by In Vitro route daily As needed. INSULIN PEN NEEDLE (PEN NEEDLES) 33G X 4 MM MISC    Use 1 daily with insulin    METFORMIN (GLUCOPHAGE) 500 MG TABLET    Take 1 tablet by mouth 2 times daily (with meals)    ONDANSETRON (ZOFRAN ODT) 4 MG DISINTEGRATING TABLET    Take 1 tablet by mouth every 8 hours as needed for Nausea or Vomiting    ONETOUCH DELICA LANCETS 94O MISC    bid    PANTOPRAZOLE (PROTONIX) 20 MG TABLET    Take 1 tablet by mouth daily    SERTRALINE (ZOLOFT) 50 MG TABLET    Take 50 mg by mouth daily       ALLERGIES     Patient has no known allergies. HISTORY     History reviewed. No pertinent family history.        SOCIAL HISTORY       Social History     Socioeconomic History    Marital status:      Spouse name: None    Number of children: None    Years of education: None    Highest education level: None   Tobacco Use    Smoking status: Former     Types: Cigarettes     Quit date:      Years since quittin.7    Smokeless tobacco: Never   Vaping Use    Vaping Use: Never used   Substance and Sexual Activity    Alcohol use: Not Currently     Comment: pt quit drinking on     Drug use: No     Comment: Daily    Sexual activity: Yes     Partners: Female       SCREENINGS    [place] Coma Scale  Eye Opening: Spontaneous  Best Verbal Response: Oriented  Best Motor Response: Obeys commands  [place] Coma Scale Score: 15      PHYSICAL EXAM    (up to 7 forlevel 4, 8 or more for level 5)     ED Triage Vitals   BP Temp Temp Source Heart Rate Resp SpO2 Height Weight   10/12/22 1800 10/12/22 1755 10/12/22 1755 10/12/22 1755 10/12/22 1755 10/12/22 1755 10/12/22 1755 10/12/22 1755   (!) 141/90 97.6 °F (36.4 °C) Temporal 80 18 98 % 5' 11\" (1.803 m) 227 lb (103 kg)       Physical Exam  Vitals and nursing note reviewed. Constitutional:       General: He is not in acute distress. Appearance: He is well-developed. He is not ill-appearing. HENT:      Head: Normocephalic. Right Ear: External ear normal.      Left Ear: External ear normal.      Nose: Nose normal.      Mouth/Throat:      Mouth: Mucous membranes are moist.   Eyes:      Conjunctiva/sclera: Conjunctivae normal.      Pupils: Pupils are equal, round, and reactive to light. Cardiovascular:      Rate and Rhythm: Normal rate and regular rhythm. Pulses: Normal pulses. Heart sounds: Normal heart sounds. No murmur heard. No friction rub. Pulmonary:      Effort: Pulmonary effort is normal.      Breath sounds: Normal breath sounds. No wheezing or rhonchi. Abdominal:      General: Bowel sounds are normal. There is no distension. Palpations: Abdomen is soft. Tenderness: There is no abdominal tenderness. Musculoskeletal:         General: Normal range of motion. Cervical back: Normal range of motion and neck supple. Skin:     General: Skin is warm and dry. Capillary Refill: Capillary refill takes less than 2 seconds. Neurological:      General: No focal deficit present. Mental Status: He is alert and oriented to person, place, and time. Mental status is at baseline. Psychiatric:         Mood and Affect: Mood normal.         Behavior: Behavior normal.         Thought Content:  Thought content normal.         Judgment: Judgment normal.         DIAGNOSTIC RESULTS     RADIOLOGY:   Non-plain film images such as CT, Ultrasound and MRI are read by the radiologist. Plain radiographic images are visualized and preliminarilyinterpreted by KEARA Gonzales CNP with the below findings:        Interpretation per the Radiologist below, if available at the time of this note:    No orders to display       LABS:  Labs Reviewed - No data to display    All other labs were within normal range or not returnedas of this dictation. EMERGENCYDEPARTMENT COURSE and DIFFERENTIAL DIAGNOSIS/MDM:   Vitals:    Vitals:    10/12/22 1755 10/12/22 1800   BP:  (!) 141/90   Pulse: 80    Resp: 18    Temp: 97.6 °F (36.4 °C)    TempSrc: Temporal    SpO2: 98%    Weight: 227 lb (103 kg)    Height: 5' 11\" (1.803 m)        REASSESSMENT            MDM  RW 50year old male presents to ED for anxiety. Patient afebrile and hemodynamically stable. Spoke with patient at length he denies any CP/SOB, he refuses cardiac work up despite discussing risks/benefits. Discussed case with attending Dr. CONTI who reviewed EKG shows NSR HR 78, SD 170ms QRS 102ms, no acute changes per his reading. Advised to DC patient home with Dx Anxiety and Atarax Rx per his request. Patient agreeable to return to ED for further evaluation if he develops any symptoms of concern. Denies any SI/HI tendencies. Discussed Dx, Tx, Rx, follow up, reasons to return to ED for further treatment with patient he states understanding and denies any questions. PROCEDURES:    Procedures      FINAL IMPRESSION      1.  Anxiety Stable         DISPOSITION/PLAN   DISPOSITION Decision To Discharge 10/12/2022 06:46:36 PM      PATIENT REFERRED TO:  [facility] ED  [address] [place] [address]  [phone]    If symptoms worsen      F/U with your PCP in 2 days  In 2 days      DISCHARGE MEDICATIONS:  New Prescriptions    HYDROXYZINE HCL (ATARAX) 25 MG TABLET    Take 1 tablet by mouth every 8 hours as needed for Anxiety       (Please note that portions of this note were completed with a voice recognition program.  Efforts were made to edit the dictations but occasionally words are mis-transcribed.)    Sylvester [address], APRN - CNP  10/12/22 [id number]

## 2022-10-12 NOTE — ED TRIAGE NOTES
Patient in with anxiety that started today after his doctor told him he could not prescribed him vistaril due to his last EKG that was done here in the ER about a week ago. Author Lynette

## 2022-10-15 LAB
EKG ATRIAL RATE: 78 BPM
EKG P AXIS: 22 DEGREES
EKG P-R INTERVAL: 170 MS
EKG Q-T INTERVAL: 382 MS
EKG QRS DURATION: 102 MS
EKG QTC CALCULATION (BAZETT): 435 MS
EKG R AXIS: 9 DEGREES
EKG T AXIS: 13 DEGREES
EKG VENTRICULAR RATE: 78 BPM

## 2022-10-15 PROCEDURE — 93010 ELECTROCARDIOGRAM REPORT: CPT | Performed by: INTERNAL MEDICINE

## 2022-12-05 ENCOUNTER — HOSPITAL ENCOUNTER (EMERGENCY)
Age: 47
Discharge: HOME | End: 2022-12-05
Attending: EMERGENCY MEDICINE
Payer: MEDICAID

## 2022-12-05 ENCOUNTER — APPOINTMENT (OUTPATIENT)
Dept: GENERAL RADIOLOGY | Age: 47
End: 2022-12-05
Payer: MEDICAID

## 2022-12-05 VITALS
HEART RATE: 79 BPM | RESPIRATION RATE: 16 BRPM | TEMPERATURE: 97.7 F | SYSTOLIC BLOOD PRESSURE: 144 MMHG | DIASTOLIC BLOOD PRESSURE: 95 MMHG | OXYGEN SATURATION: 99 %

## 2022-12-05 DIAGNOSIS — M25.462 KNEE EFFUSION, LEFT: ICD-10-CM

## 2022-12-05 DIAGNOSIS — M25.562 ACUTE PAIN OF LEFT KNEE: Primary | ICD-10-CM

## 2022-12-05 PROCEDURE — 99283 EMERGENCY DEPT VISIT LOW MDM: CPT

## 2022-12-05 PROCEDURE — 6370000000 HC RX 637 (ALT 250 FOR IP): Performed by: EMERGENCY MEDICINE

## 2022-12-05 PROCEDURE — 73562 X-RAY EXAM OF KNEE 3: CPT

## 2022-12-05 PROCEDURE — 6360000002 HC RX W HCPCS: Performed by: EMERGENCY MEDICINE

## 2022-12-05 RX ORDER — IBUPROFEN 400 MG/1
800 TABLET ORAL ONCE
Status: COMPLETED | OUTPATIENT
Start: 2022-12-05 | End: 2022-12-05

## 2022-12-05 RX ORDER — DEXAMETHASONE 4 MG/1
4 TABLET ORAL ONCE
Status: COMPLETED | OUTPATIENT
Start: 2022-12-05 | End: 2022-12-05

## 2022-12-05 RX ORDER — NAPROXEN 500 MG/1
500 TABLET ORAL 2 TIMES DAILY
Qty: 30 TABLET | Refills: 0 | Status: SHIPPED | OUTPATIENT
Start: 2022-12-05 | End: 2022-12-15

## 2022-12-05 RX ADMIN — IBUPROFEN 800 MG: 400 TABLET, FILM COATED ORAL at 18:28

## 2022-12-05 RX ADMIN — DEXAMETHASONE 4 MG: 4 TABLET ORAL at 18:28

## 2022-12-05 ASSESSMENT — ENCOUNTER SYMPTOMS
EYE REDNESS: 0
BACK PAIN: 0
CONSTIPATION: 0
STRIDOR: 0
DIARRHEA: 0
ABDOMINAL PAIN: 0
COUGH: 0
FACIAL SWELLING: 0
VOMITING: 0
TROUBLE SWALLOWING: 0
WHEEZING: 0
BLOOD IN STOOL: 0
CHOKING: 0
SORE THROAT: 0
EYE DISCHARGE: 0
EYE PAIN: 0
SHORTNESS OF BREATH: 0
VOICE CHANGE: 0
SINUS PRESSURE: 0
CHEST TIGHTNESS: 0

## 2022-12-05 ASSESSMENT — PAIN DESCRIPTION - LOCATION
LOCATION: KNEE

## 2022-12-05 ASSESSMENT — PAIN SCALES - GENERAL
PAINLEVEL_OUTOF10: 7
PAINLEVEL_OUTOF10: 4
PAINLEVEL_OUTOF10: 7

## 2022-12-05 ASSESSMENT — PAIN DESCRIPTION - DESCRIPTORS
DESCRIPTORS: ACHING
DESCRIPTORS: ACHING

## 2022-12-05 ASSESSMENT — PAIN - FUNCTIONAL ASSESSMENT
PAIN_FUNCTIONAL_ASSESSMENT: 0-10
PAIN_FUNCTIONAL_ASSESSMENT: 0-10

## 2022-12-05 ASSESSMENT — PAIN DESCRIPTION - ONSET: ONSET: ON-GOING

## 2022-12-05 ASSESSMENT — PAIN DESCRIPTION - ORIENTATION
ORIENTATION: LEFT

## 2022-12-05 ASSESSMENT — PAIN DESCRIPTION - PAIN TYPE: TYPE: ACUTE PAIN

## 2022-12-05 NOTE — ED PROVIDER NOTES
[facility] ED  eMERGENCY dEPARTMENT eNCOUnter      Pt Name: Humberto Bobo  MRN: [id number]  [place] 1975  Date of evaluation: 12/5/2022  Provider: Lucy Wang MD    CHIEF COMPLAINT       Chief Complaint   Patient presents with    Knee Pain     C/o  left knee pain  states he thinks  he twisted it a few days  ago   states it keeps \"popping\"      pt can not have a narcotic pain meds       TORY OF PRESENT ILLNESS   (Location/Symptom, Timing/Onset,Context/Setting, Quality, Duration, Modifying Factors, Severity)  Note limiting factors. Humberto Bobo is a 50 y.o. male who presents to the emergency department patient well-known to me from encounters emergency history diabetes mellitus chronic alcohol abuse anxiety disorder complex emergency because the left knee issue which is going on for the past 2-3 this time after he twisted his ankle and felt a pop ever since he has a pain worse with ambulation and weightbearing no numbness tingling to the toes no other injuries    HPI    NursingNotes were reviewed. REVIEW OF SYSTEMS    (2-9 systems for level 4, 10 or more for level 5)     Review of Systems   Constitutional: Negative. Negative for activity change and fever. HENT:  Negative for congestion, drooling, facial swelling, mouth sores, nosebleeds, sinus pressure, sore throat, trouble swallowing and voice change. Eyes:  Negative for pain, discharge, redness and visual disturbance. Respiratory:  Negative for cough, choking, chest tightness, shortness of breath, wheezing and stridor. Cardiovascular:  Negative for chest pain, palpitations and leg swelling. Gastrointestinal:  Negative for abdominal pain, blood in stool, constipation, diarrhea and vomiting. Endocrine: Negative for cold intolerance, polyphagia and polyuria. Genitourinary:  Negative for dysuria, flank pain, frequency, genital sores and urgency. Musculoskeletal:  Positive for arthralgias and joint swelling.  Negative for back pain, neck pain and neck stiffness. Skin:  Negative for pallor and rash. Neurological:  Negative for tremors, seizures, syncope, weakness, numbness and headaches. Hematological:  Negative for adenopathy. Does not bruise/bleed easily. Psychiatric/Behavioral:  Negative for agitation, behavioral problems, hallucinations and sleep disturbance. The patient is not hyperactive. All other systems reviewed and are negative. Except as noted above the remainder of the review of systems was reviewed and negative. PAST MEDICAL HISTORY     Past Medical History:   Diagnosis Date    Depression     Diabetes mellitus ([facility] [address]     ETOH abuse     Sleep apnea          SURGICALHISTORY     No past surgical history on file. CURRENT MEDICATIONS       Previous Medications    ALCOHOL SWABS 70 % PADS    1 box by Does not apply route daily    BLOOD GLUCOSE MONITORING SUPPL (ACCU-CHEK COMPACT CARE KIT) KIT    1 kit by Does not apply route daily    BLOOD GLUCOSE MONITORING SUPPL (ONETOUCH VERIO) W/DEVICE KIT    As directed    BLOOD GLUCOSE TEST STRIPS (ONE TOUCH ULTRA TEST) STRIP    Test TID Dx E11.65    BLOOD GLUCOSE TEST STRIPS (ONETOUCH VERIO) STRIP    1 each by In Vitro route daily As needed. INSULIN PEN NEEDLE (PEN NEEDLES) 33G X 4 MM MISC    Use 1 daily with insulin    METFORMIN (GLUCOPHAGE) 500 MG TABLET    Take 1 tablet by mouth 2 times daily (with meals)    ONDANSETRON (ZOFRAN ODT) 4 MG DISINTEGRATING TABLET    Take 1 tablet by mouth every 8 hours as needed for Nausea or Vomiting    ONETOUCH DELICA LANCETS 82C MISC    bid    PANTOPRAZOLE (PROTONIX) 20 MG TABLET    Take 1 tablet by mouth daily    SERTRALINE (ZOLOFT) 50 MG TABLET    Take 50 mg by mouth daily       ALLERGIES     Patient has no known allergies. FAMILY HISTORY     No family history on file.        SOCIAL HISTORY       Social History     Socioeconomic History    Marital status:    Tobacco Use    Smoking status: Former     Types: Cigarettes Quit date:      Years since quittin.9    Smokeless tobacco: Never   Vaping Use    Vaping Use: Never used   Substance and Sexual Activity    Alcohol use: Not Currently     Comment: pt quit drinking on     Drug use: No     Comment: Daily    Sexual activity: Yes     Partners: Female       SCREENINGS    Mikie Coma Scale  Eye Opening: Spontaneous  Best Verbal Response: Oriented  Best Motor Response: Obeys commands  [place] Coma Scale Score: 15 @FLOW(01280999)@      PHYSICAL EXAM    (up to 7 for level 4, 8 or more for level 5)     ED Triage Vitals [22 1756]   BP Temp Temp src Heart Rate Resp SpO2 Height Weight   (!) 144/95 97.7 °F (36.5 °C) -- 79 16 99 % -- --       Physical Exam  Vitals reviewed. Constitutional:       General: He is not in acute distress. Appearance: Normal appearance. He is well-developed and normal weight. He is not ill-appearing, toxic-appearing or diaphoretic. HENT:      Head: Normocephalic and atraumatic. Right Ear: Tympanic membrane, ear canal and external ear normal.      Left Ear: Tympanic membrane, ear canal and external ear normal.      Nose: Nose normal. No congestion or rhinorrhea. Mouth/Throat:      Mouth: Mucous membranes are moist.      Pharynx: Posterior oropharyngeal erythema present. No oropharyngeal exudate. Eyes:      General:         Right eye: No discharge. Left eye: No discharge. Pupils: Pupils are equal, round, and reactive to light. Cardiovascular:      Rate and Rhythm: Normal rate. Pulses: Normal pulses. Heart sounds: Normal heart sounds. No murmur heard. No friction rub. No gallop. Pulmonary:      Effort: No respiratory distress. Breath sounds: Normal breath sounds. No stridor. No wheezing or rhonchi. Chest:      Chest wall: No tenderness. Abdominal:      General: Bowel sounds are normal. There is no distension. Palpations: Abdomen is soft. There is no mass. Tenderness:  There is no abdominal tenderness. There is no right CVA tenderness, left CVA tenderness, guarding or rebound. Hernia: No hernia is present. Musculoskeletal:         General: Swelling and tenderness present. No deformity or signs of injury. Normal range of motion. Cervical back: Neck supple. No rigidity. Right lower leg: No edema. Left lower leg: No edema. Comments: Attention  to the left knee compared to right knee patient has a hematoma no bruise noted patient does have tenderness to the medial aspect patient range of motion slightly diminished minimal joint effusion noticeable there is a less suspicion of any ligament injury at this time exam slightly limited because of the tenderness patient has no warmth no insect bite no cellulitis skin is intact   Lymphadenopathy:      Cervical: No cervical adenopathy. Skin:     General: Skin is warm. Coloration: Skin is not jaundiced. Findings: No bruising, erythema, lesion or rash. Neurological:      Mental Status: He is alert and oriented to person, place, and time. Cranial Nerves: No cranial nerve deficit. Sensory: No sensory deficit. Motor: No weakness or abnormal muscle tone. Coordination: Coordination normal.      Gait: Gait normal.   Psychiatric:         Behavior: Behavior normal.         Thought Content:  Thought content normal.       DIAGNOSTIC RESULTS     EKG: All EKG's are interpreted by the Emergency Department Physician who either signs or Co-signsthis chart in the absence of a cardiologist.        RADIOLOGY:   Deborra Lento such as CT, Ultrasound and MRI are read by the radiologist. Plain radiographic images are visualized and preliminarily interpreted by the emergency physician with the below findings:        Interpretation per the Radiologist below, if available at the time ofthis note:    XR KNEE LEFT (3 VIEWS)    (Results Pending)         ED BEDSIDE ULTRASOUND:   Performed by ED Physician - none    LABS:  Labs Reviewed - No data to display    All other labs were within normal range or not returned as of this dictation. EMERGENCY DEPARTMENT COURSE and DIFFERENTIAL DIAGNOSIS/MDM:   Vitals:    Vitals:    12/05/22 1756   BP: (!) 144/95   Pulse: 79   Resp: 16   Temp: 97.7 °F (36.5 °C)   SpO2: 99%           MDM      CRITICAL CARE TIME   Total Critical Care time was minutes, excluding separately reportableprocedures. There was a high probability of clinicallysignificant/life threatening deterioration in the patient's condition which required my urgent intervention. SULTS:  None    PROCEDURES:  Unless otherwise noted below, none     Procedures    FINAL IMPRESSION      1. Acute pain of left knee    2.  Knee effusion, left          DISPOSITION/PLAN   DISPOSITION Discharge - Pending Orders Complete 12/05/2022 06:18:31 PM      PATIENT REFERRED TO:  [facility]  [address] [facility]  In 1 week      DISCHARGE MEDICATIONS:  New Prescriptions    NAPROXEN (NAPROSYN) 500 MG TABLET    Take 1 tablet by mouth 2 times daily for 20 doses          (Please note that portions of this note were completed with a voice recognition program.  Efforts were made to edit the dictations but occasionally words are mis-transcribed.)    Haydee Hatchet, MD (electronically signed)  Attending Emergency Physician        Haydee Hatchet, MD  12/05/22 0787       Haydee Hatchet, MD  12/05/22 [phone]

## 2023-02-14 ENCOUNTER — HOSPITAL ENCOUNTER (EMERGENCY)
Age: 48
Discharge: HOME | End: 2023-02-14
Attending: EMERGENCY MEDICINE
Payer: MEDICAID

## 2023-02-14 VITALS
WEIGHT: 235 LBS | DIASTOLIC BLOOD PRESSURE: 90 MMHG | SYSTOLIC BLOOD PRESSURE: 139 MMHG | HEIGHT: 71 IN | OXYGEN SATURATION: 95 % | HEART RATE: 69 BPM | RESPIRATION RATE: 17 BRPM | BODY MASS INDEX: 32.9 KG/M2 | TEMPERATURE: 98.9 F

## 2023-02-14 DIAGNOSIS — F32.A DEPRESSION, UNSPECIFIED DEPRESSION TYPE: Primary | ICD-10-CM

## 2023-02-14 DIAGNOSIS — R73.9 HYPERGLYCEMIA: ICD-10-CM

## 2023-02-14 LAB
ALBUMIN SERPL-MCNC: 4.4 G/DL (ref 3.5–4.6)
ALP BLD-CCNC: 175 U/L (ref 35–104)
ALT SERPL-CCNC: 78 U/L (ref 0–41)
ANION GAP SERPL CALCULATED.3IONS-SCNC: 14 MEQ/L (ref 9–15)
AST SERPL-CCNC: 34 U/L (ref 0–40)
BASOPHILS ABSOLUTE: 0.1 K/UL (ref 0–0.1)
BASOPHILS RELATIVE PERCENT: 1.6 % (ref 0.2–1.2)
BILIRUB SERPL-MCNC: 0.3 MG/DL (ref 0.2–0.7)
BILIRUBIN URINE: NEGATIVE
BLOOD, URINE: NEGATIVE
BUN BLDV-MCNC: 11 MG/DL (ref 6–20)
CALCIUM SERPL-MCNC: 9.7 MG/DL (ref 8.5–9.9)
CHLORIDE BLD-SCNC: 99 MEQ/L (ref 95–107)
CHP ED QC CHECK: YES
CHP ED QC CHECK: YES
CLARITY: CLEAR
CO2: 21 MEQ/L (ref 20–31)
COLOR: YELLOW
CREAT SERPL-MCNC: 0.94 MG/DL (ref 0.7–1.2)
EKG ATRIAL RATE: 84 BPM
EKG P AXIS: 14 DEGREES
EKG P-R INTERVAL: 160 MS
EKG Q-T INTERVAL: 382 MS
EKG QRS DURATION: 104 MS
EKG QTC CALCULATION (BAZETT): 451 MS
EKG R AXIS: 8 DEGREES
EKG T AXIS: -4 DEGREES
EKG VENTRICULAR RATE: 84 BPM
EOSINOPHILS ABSOLUTE: 0.5 K/UL (ref 0–0.5)
EOSINOPHILS RELATIVE PERCENT: 8.2 % (ref 0.8–7)
GFR SERPL CREATININE-BSD FRML MDRD: >60 ML/MIN/{1.73_M2}
GLOBULIN: 2.8 G/DL (ref 2.3–3.5)
GLUCOSE BLD-MCNC: 219 MG/DL
GLUCOSE BLD-MCNC: 219 MG/DL (ref 70–99)
GLUCOSE BLD-MCNC: 250 MG/DL
GLUCOSE BLD-MCNC: 250 MG/DL (ref 70–99)
GLUCOSE BLD-MCNC: 430 MG/DL (ref 70–99)
GLUCOSE BLD-MCNC: 447 MG/DL (ref 70–99)
GLUCOSE URINE: 500 MG/DL
HBA1C MFR BLD: 8.3 % (ref 4.8–5.9)
HCT VFR BLD CALC: 37.3 % (ref 42–52)
HEMOGLOBIN: 13.5 G/DL (ref 13.7–17.5)
IMMATURE GRANULOCYTES #: 0 K/UL
IMMATURE GRANULOCYTES %: 0.6 %
KETONES, URINE: NEGATIVE MG/DL
LACTIC ACID: 1.6 MMOL/L (ref 0.5–2.2)
LACTIC ACID: 2.8 MMOL/L (ref 0.5–2.2)
LEUKOCYTE ESTERASE, URINE: NEGATIVE
LYMPHOCYTES ABSOLUTE: 2.3 K/UL (ref 1.3–3.6)
LYMPHOCYTES RELATIVE PERCENT: 36.3 %
MCH RBC QN AUTO: 30.1 PG (ref 25.7–32.2)
MCHC RBC AUTO-ENTMCNC: 36.2 % (ref 32.3–36.5)
MCV RBC AUTO: 83.3 FL (ref 79–92.2)
MONOCYTES ABSOLUTE: 0.5 K/UL (ref 0.3–0.8)
MONOCYTES RELATIVE PERCENT: 7.3 % (ref 5.3–12.2)
NEUTROPHILS ABSOLUTE: 2.9 K/UL (ref 1.8–5.4)
NEUTROPHILS RELATIVE PERCENT: 46 % (ref 34–67.9)
NITRITE, URINE: NEGATIVE
PDW BLD-RTO: 12.5 % (ref 11.6–14.4)
PERFORMED ON: ABNORMAL
PH UA: 5.5 (ref 5–9)
PLATELET # BLD: 223 K/UL (ref 163–337)
POTASSIUM REFLEX MAGNESIUM: 4.1 MEQ/L (ref 3.4–4.9)
PROTEIN UA: NEGATIVE MG/DL
RBC # BLD: 4.48 M/UL (ref 4.63–6.08)
SODIUM BLD-SCNC: 134 MEQ/L (ref 135–144)
SPECIFIC GRAVITY UA: 1.01 (ref 1–1.03)
TOTAL PROTEIN: 7.2 G/DL (ref 6.3–8)
TROPONIN: <0.01 NG/ML (ref 0–0.01)
URINE REFLEX TO CULTURE: ABNORMAL
UROBILINOGEN, URINE: 0.2 E.U./DL
WBC # BLD: 6.3 K/UL (ref 4.2–9)

## 2023-02-14 PROCEDURE — 99284 EMERGENCY DEPT VISIT MOD MDM: CPT

## 2023-02-14 PROCEDURE — 83036 HEMOGLOBIN GLYCOSYLATED A1C: CPT

## 2023-02-14 PROCEDURE — 96361 HYDRATE IV INFUSION ADD-ON: CPT

## 2023-02-14 PROCEDURE — 96374 THER/PROPH/DIAG INJ IV PUSH: CPT

## 2023-02-14 PROCEDURE — 81003 URINALYSIS AUTO W/O SCOPE: CPT

## 2023-02-14 PROCEDURE — 2580000003 HC RX 258: Performed by: EMERGENCY MEDICINE

## 2023-02-14 PROCEDURE — 83605 ASSAY OF LACTIC ACID: CPT

## 2023-02-14 PROCEDURE — 84484 ASSAY OF TROPONIN QUANT: CPT

## 2023-02-14 PROCEDURE — 80053 COMPREHEN METABOLIC PANEL: CPT

## 2023-02-14 PROCEDURE — 85025 COMPLETE CBC W/AUTO DIFF WBC: CPT

## 2023-02-14 PROCEDURE — 6370000000 HC RX 637 (ALT 250 FOR IP): Performed by: EMERGENCY MEDICINE

## 2023-02-14 PROCEDURE — 36415 COLL VENOUS BLD VENIPUNCTURE: CPT

## 2023-02-14 PROCEDURE — 93005 ELECTROCARDIOGRAM TRACING: CPT

## 2023-02-14 RX ORDER — SODIUM CHLORIDE 9 MG/ML
30 INJECTION, SOLUTION INTRAVENOUS ONCE
Status: DISCONTINUED | OUTPATIENT
Start: 2023-02-14 | End: 2023-02-14

## 2023-02-14 RX ORDER — 0.9 % SODIUM CHLORIDE 0.9 %
1000 INTRAVENOUS SOLUTION INTRAVENOUS ONCE
Status: COMPLETED | OUTPATIENT
Start: 2023-02-14 | End: 2023-02-14

## 2023-02-14 RX ORDER — 0.9 % SODIUM CHLORIDE 0.9 %
2000 INTRAVENOUS SOLUTION INTRAVENOUS ONCE
Status: COMPLETED | OUTPATIENT
Start: 2023-02-14 | End: 2023-02-14

## 2023-02-14 RX ADMIN — SODIUM CHLORIDE 2000 ML: 9 INJECTION, SOLUTION INTRAVENOUS at 10:56

## 2023-02-14 RX ADMIN — SODIUM CHLORIDE 1000 ML: 9 INJECTION, SOLUTION INTRAVENOUS at 09:47

## 2023-02-14 RX ADMIN — INSULIN HUMAN 10 UNITS: 100 INJECTION, SOLUTION PARENTERAL at 09:47

## 2023-02-14 ASSESSMENT — ENCOUNTER SYMPTOMS
COUGH: 0
ABDOMINAL PAIN: 0
EYE REDNESS: 0
BACK PAIN: 0
SORE THROAT: 0
SHORTNESS OF BREATH: 0
NAUSEA: 0
VOMITING: 0
EYE DISCHARGE: 0

## 2023-02-14 ASSESSMENT — PAIN - FUNCTIONAL ASSESSMENT: PAIN_FUNCTIONAL_ASSESSMENT: NONE - DENIES PAIN

## 2023-02-14 NOTE — ED NOTES
Pt instructed X2 to keep left arm straight for IV fluids to infuse     Mandy Wells RN  02/14/23 8465

## 2023-02-14 NOTE — ED PROVIDER NOTES
[address] [place] [address] ED  EMERGENCY DEPARTMENT ENCOUNTER      Pt Name: Huyen Cazares  MRN: [id number]  [place] 1975  Date of evaluation: 2/14/2023  Provider: Dhara Russell DO        HISTORY OF PRESENT ILLNESS    Huyen Cazares is a 46 y.o. male per chart review has ah/o depression, diabetes, ETOH, sleep apnea. The history is provided by the patient. Hyperglycemia  Severity:  Moderate  Onset quality:  Sudden  Timing:  Constant  Progression:  Unchanged  Chronicity:  New  Diabetes status:  Non-diabetic  Relieved by:  Nothing  Ineffective treatments:  None tried  Associated symptoms: dizziness and increased thirst    Associated symptoms: no abdominal pain, no chest pain, no confusion, no dysuria, no fever, no nausea, no shortness of breath and no vomiting    Risk factors: obesity           REVIEW OF SYSTEMS       Review of Systems   Constitutional:  Negative for chills and fever. HENT:  Negative for ear pain and sore throat. Eyes:  Negative for discharge and redness. Respiratory:  Negative for cough and shortness of breath. Cardiovascular:  Negative for chest pain and palpitations. Gastrointestinal:  Negative for abdominal pain, nausea and vomiting. Endocrine: Positive for polydipsia. Genitourinary:  Negative for difficulty urinating and dysuria. Musculoskeletal:  Negative for back pain and neck pain. Skin:  Negative for rash and wound. Neurological:  Positive for dizziness. Negative for syncope. Psychiatric/Behavioral:  Negative for confusion. The patient is not nervous/anxious. All other systems reviewed and are negative. Except as noted above the remainder of the review of systems was reviewed and negative. PAST MEDICAL HISTORY     Past Medical History:   Diagnosis Date    Depression     Diabetes mellitus ([facility] [address]     ETOH abuse     Sleep apnea          SURGICAL HISTORY     History reviewed. No pertinent surgical history.       CURRENT MEDICATIONS       Discharge Medication List as of 2023  1:12 PM        CONTINUE these medications which have NOT CHANGED    Details   naproxen (NAPROSYN) 500 MG tablet Take 1 tablet by mouth 2 times daily for 20 doses, Disp-30 tablet, R-0Print      pantoprazole (PROTONIX) 20 MG tablet Take 1 tablet by mouth daily, Disp-30 tablet, R-0Print      ondansetron (ZOFRAN ODT) 4 MG disintegrating tablet Take 1 tablet by mouth every 8 hours as needed for Nausea or Vomiting, Disp-12 tablet, R-0Print      metFORMIN (GLUCOPHAGE) 500 MG tablet Take 1 tablet by mouth 2 times daily (with meals), Disp-60 tablet,R-3Normal      OneTouch Delica Lancets 65X MISC bid, Disp-100 each,R-5Normal      !! blood glucose test strips (ONETOUCH VERIO) strip 1 each by In Vitro route daily As needed. , Disp-100 each,R-3Normal      Blood Glucose Monitoring Suppl (Henrry Morales) w/Device KIT As directed, Disp-1 kit,R-0Normal      !! blood glucose test strips (ONE TOUCH ULTRA TEST) strip Test TID Dx E11.65, Disp-100 each,R-3Normal      Insulin Pen Needle (PEN NEEDLES) 33G X 4 MM MISC Use 1 daily with insulin, Disp-100 each,R-3Normal      Blood Glucose Monitoring Suppl (ACCU-CHEK COMPACT CARE KIT) KIT DAILY Starting 2020, Disp-1 kit, R-0, Print      Alcohol Swabs 70 % PADS DAILY Starting 2020, Disp-5 each, R-3, Print      sertraline (ZOLOFT) 50 MG tablet Take 50 mg by mouth dailyHistorical Med       !! - Potential duplicate medications found. Please discuss with provider. ALLERGIES     Patient has no known allergies. FAMILY HISTORY     History reviewed. No pertinent family history.        SOCIAL HISTORY       Social History     Socioeconomic History    Marital status:      Spouse name: None    Number of children: None    Years of education: None    Highest education level: None   Tobacco Use    Smoking status: Former     Types: Cigarettes     Quit date:      Years since quittin.1    Smokeless tobacco: Never   Vaping Use    Vaping Use: Never used Substance and Sexual Activity    Alcohol use: Not Currently     Comment: pt quit drinking on Sunday    Drug use: No     Comment: Daily    Sexual activity: Yes     Partners: Female         PHYSICAL EXAM       ED Triage Vitals   BP Temp Temp Source Heart Rate Resp SpO2 Height Weight   02/14/23 0903 02/14/23 0914 02/14/23 0914 02/14/23 0903 02/14/23 0903 02/14/23 0903 02/14/23 0903 02/14/23 0903   (!) 149/98 98.9 °F (37.2 °C) Oral 87 18 96 % 5' 11\" (1.803 m) 235 lb (106.6 kg)       Physical Exam  Vitals and nursing note reviewed. Constitutional:       Appearance: Normal appearance. He is obese. HENT:      Head: Normocephalic and atraumatic. Right Ear: Tympanic membrane normal.      Left Ear: Tympanic membrane normal.      Nose: Nose normal.      Mouth/Throat:      Mouth: Mucous membranes are moist.      Pharynx: Oropharynx is clear. Eyes:      General: Lids are normal.      Extraocular Movements: Extraocular movements intact. Conjunctiva/sclera: Conjunctivae normal.      Pupils: Pupils are equal, round, and reactive to light. Cardiovascular:      Rate and Rhythm: Normal rate and regular rhythm. Pulses: Normal pulses. Heart sounds: Normal heart sounds. Pulmonary:      Effort: Pulmonary effort is normal.      Breath sounds: Normal breath sounds. Abdominal:      General: Abdomen is flat. Bowel sounds are normal.      Palpations: Abdomen is soft. Musculoskeletal:         General: Normal range of motion. Cervical back: Full passive range of motion without pain, normal range of motion and neck supple. Skin:     General: Skin is warm. Capillary Refill: Capillary refill takes less than 2 seconds. Neurological:      General: No focal deficit present. Mental Status: He is alert and oriented to person, place, and time. Deep Tendon Reflexes: Reflexes are normal and symmetric.    Psychiatric:         Attention and Perception: Attention and perception normal.         Mood and Affect: Mood normal.         Behavior: Behavior normal. Behavior is cooperative.          LABS:  Labs Reviewed   CBC WITH AUTO DIFFERENTIAL - Abnormal; Notable for the following components:       Result Value    RBC 4.48 (*)     Hemoglobin 13.5 (*)     Hematocrit 37.3 (*)     Eosinophils % 8.2 (*)     Basophils % 1.6 (*)     All other components within normal limits   COMPREHENSIVE METABOLIC PANEL W/ REFLEX TO MG FOR LOW K - Abnormal; Notable for the following components:    Sodium 134 (*)     Glucose 447 (*)     Alkaline Phosphatase 175 (*)     ALT 78 (*)     All other components within normal limits    Narrative:     CALL  Wil CRUZ tel. [phone],  Glu  results called to and read back by MA, 02/14/2023 10:05, by [facility]   LACTIC ACID - Abnormal; Notable for the following components:    Lactic Acid 2.8 (*)     All other components within normal limits   URINALYSIS WITH REFLEX TO CULTURE - Abnormal; Notable for the following components:    Glucose, Ur 500 (*)     All other components within normal limits   HEMOGLOBIN A1C - Abnormal; Notable for the following components:    Hemoglobin A1C 8.3 (*)     All other components within normal limits   POCT GLUCOSE - Abnormal; Notable for the following components:    POC Glucose 430 (*)     All other components within normal limits   POCT GLUCOSE - Abnormal; Notable for the following components:    POC Glucose 250 (*)     All other components within normal limits   POCT GLUCOSE - Abnormal; Notable for the following components:    POC Glucose 219 (*)     All other components within normal limits   POCT GLUCOSE - Normal   POCT GLUCOSE - Normal   TROPONIN   LACTIC ACID         MDM:   Vitals:    Vitals:    02/14/23 0903 02/14/23 0914 02/14/23 1059 02/14/23 1153   BP: (!) 149/98  128/88 (!) 139/90   Pulse: 87  73 69   Resp: 18  20 17   Temp:  98.9 °F (37.2 °C)     TempSrc:  Oral     SpO2: 96%  97% 95%   Weight: 235 lb (106.6 kg)      Height: 5' 11\" (1.803 m) MDM  Number of Diagnoses or Management Options  Depression, unspecified depression type  Hyperglycemia  Diagnosis management comments: Patient presents with complaints of elevated blood sugar at home. He denies any recent illness. Labs , IVF ordered. I ordered insulin 10 Units IV. I also ordered a total of 3 liters IVF as his lactic acid was elevated. I repeated the lactic acid after the fluids. The patient feels better and his glucose is 219. He will be discharged home. He will follow up in 2 days with his primary care doctor. Amount and/or Complexity of Data Reviewed  Clinical lab tests: ordered and reviewed         No orders to display         EKG Interpretation    Interpreted by emergency department physician    Rhythm: normal sinus   Rate: normal  Axis: normal  Ectopy: none  Conduction: right bundle branch block (incomplete)  ST Segments: nonspecific changes  T Waves: no acute change  Q Waves: none    Clinical Impression: non-specific EKG    IKE BEEBE DO     The lab results, radiology and test results were reviewed with the patient and family. The patient will follow up in 2 days with their primary care doctor. If their symptoms change or get worse they will return to the ER. CRITICAL CARE TIME   Total CriticalCare time was 0 minutes, excluding separately reportable procedures. There was a high probability of clinically significant/life threatening deterioration in the patient's condition which required my urgent intervention. PROCEDURES:  Unlessotherwise noted below, none     Procedures      FINAL IMPRESSION      1. Depression, unspecified depression type    2.  Hyperglycemia          DISPOSITION/PLAN   DISPOSITION Decision To Discharge 02/14/2023 01:16:04 PM          IKE Alicea DO (electronically signed)  Attending Emergency Physician          Karina Sexton DO  02/17/23 2332

## 2023-03-28 ENCOUNTER — APPOINTMENT (OUTPATIENT)
Dept: CT IMAGING | Age: 48
End: 2023-03-28
Payer: MEDICAID

## 2023-03-28 ENCOUNTER — HOSPITAL ENCOUNTER (EMERGENCY)
Age: 48
Discharge: HOME | End: 2023-03-28
Attending: EMERGENCY MEDICINE
Payer: MEDICAID

## 2023-03-28 VITALS
RESPIRATION RATE: 16 BRPM | HEIGHT: 71 IN | DIASTOLIC BLOOD PRESSURE: 95 MMHG | HEART RATE: 65 BPM | WEIGHT: 235 LBS | TEMPERATURE: 97.2 F | OXYGEN SATURATION: 98 % | BODY MASS INDEX: 32.9 KG/M2 | SYSTOLIC BLOOD PRESSURE: 155 MMHG

## 2023-03-28 DIAGNOSIS — R51.9 ACUTE NONINTRACTABLE HEADACHE, UNSPECIFIED HEADACHE TYPE: Primary | ICD-10-CM

## 2023-03-28 LAB
ALBUMIN SERPL-MCNC: 4.5 G/DL (ref 3.5–4.6)
ALP SERPL-CCNC: 161 U/L (ref 35–104)
ALT SERPL-CCNC: 110 U/L (ref 0–41)
ANION GAP SERPL CALCULATED.3IONS-SCNC: 14 MEQ/L (ref 9–15)
AST SERPL-CCNC: 55 U/L (ref 0–40)
BASOPHILS # BLD: 0.1 K/UL (ref 0–0.1)
BASOPHILS NFR BLD: 0.9 % (ref 0.2–1.2)
BILIRUB SERPL-MCNC: 0.7 MG/DL (ref 0.2–0.7)
BUN SERPL-MCNC: 12 MG/DL (ref 6–20)
CALCIUM SERPL-MCNC: 9.8 MG/DL (ref 8.5–9.9)
CHLORIDE SERPL-SCNC: 104 MEQ/L (ref 95–107)
CO2 SERPL-SCNC: 21 MEQ/L (ref 20–31)
CREAT SERPL-MCNC: 0.83 MG/DL (ref 0.7–1.2)
EOSINOPHIL # BLD: 0.4 K/UL (ref 0–0.5)
EOSINOPHIL NFR BLD: 4.4 % (ref 0.8–7)
ERYTHROCYTE [DISTWIDTH] IN BLOOD BY AUTOMATED COUNT: 13.1 % (ref 11.6–14.4)
GLOBULIN SER CALC-MCNC: 3.1 G/DL (ref 2.3–3.5)
GLUCOSE SERPL-MCNC: 205 MG/DL (ref 70–99)
HCT VFR BLD AUTO: 41.6 % (ref 42–52)
HGB BLD-MCNC: 14.9 G/DL (ref 13.7–17.5)
IMM GRANULOCYTES # BLD: 0 K/UL
IMM GRANULOCYTES NFR BLD: 0.4 %
LYMPHOCYTES # BLD: 1.7 K/UL (ref 1.3–3.6)
LYMPHOCYTES NFR BLD: 19.9 %
MCH RBC QN AUTO: 30 PG (ref 25.7–32.2)
MCHC RBC AUTO-ENTMCNC: 35.8 % (ref 32.3–36.5)
MCV RBC AUTO: 83.7 FL (ref 79–92.2)
MONOCYTES # BLD: 0.5 K/UL (ref 0.3–0.8)
MONOCYTES NFR BLD: 6 % (ref 5.3–12.2)
NEUTROPHILS # BLD: 5.8 K/UL (ref 1.8–5.4)
NEUTS SEG NFR BLD: 68.4 % (ref 34–67.9)
PLATELET # BLD AUTO: 302 K/UL (ref 163–337)
POTASSIUM SERPL-SCNC: 3.9 MEQ/L (ref 3.4–4.9)
PROT SERPL-MCNC: 7.6 G/DL (ref 6.3–8)
RBC # BLD AUTO: 4.97 M/UL (ref 4.63–6.08)
SODIUM SERPL-SCNC: 139 MEQ/L (ref 135–144)
WBC # BLD AUTO: 8.5 K/UL (ref 4.2–9)

## 2023-03-28 PROCEDURE — 70450 CT HEAD/BRAIN W/O DYE: CPT

## 2023-03-28 PROCEDURE — 80053 COMPREHEN METABOLIC PANEL: CPT

## 2023-03-28 PROCEDURE — 99284 EMERGENCY DEPT VISIT MOD MDM: CPT

## 2023-03-28 PROCEDURE — 36415 COLL VENOUS BLD VENIPUNCTURE: CPT

## 2023-03-28 PROCEDURE — 2580000003 HC RX 258: Performed by: EMERGENCY MEDICINE

## 2023-03-28 PROCEDURE — 85025 COMPLETE CBC W/AUTO DIFF WBC: CPT

## 2023-03-28 PROCEDURE — 96365 THER/PROPH/DIAG IV INF INIT: CPT

## 2023-03-28 PROCEDURE — 96372 THER/PROPH/DIAG INJ SC/IM: CPT

## 2023-03-28 PROCEDURE — 96375 TX/PRO/DX INJ NEW DRUG ADDON: CPT

## 2023-03-28 PROCEDURE — 6360000002 HC RX W HCPCS: Performed by: EMERGENCY MEDICINE

## 2023-03-28 PROCEDURE — 6370000000 HC RX 637 (ALT 250 FOR IP): Performed by: EMERGENCY MEDICINE

## 2023-03-28 RX ORDER — METOCLOPRAMIDE HYDROCHLORIDE 5 MG/ML
10 INJECTION INTRAMUSCULAR; INTRAVENOUS ONCE
Status: COMPLETED | OUTPATIENT
Start: 2023-03-28 | End: 2023-03-28

## 2023-03-28 RX ORDER — METOCLOPRAMIDE 10 MG/1
10 TABLET ORAL 2 TIMES DAILY PRN
Qty: 60 TABLET | Refills: 0 | Status: SHIPPED | OUTPATIENT
Start: 2023-03-28

## 2023-03-28 RX ORDER — IBUPROFEN 800 MG/1
800 TABLET ORAL 2 TIMES DAILY PRN
Qty: 180 TABLET | Refills: 1 | Status: SHIPPED | OUTPATIENT
Start: 2023-03-28

## 2023-03-28 RX ORDER — KETOROLAC TROMETHAMINE 15 MG/ML
15 INJECTION, SOLUTION INTRAMUSCULAR; INTRAVENOUS ONCE
Status: COMPLETED | OUTPATIENT
Start: 2023-03-28 | End: 2023-03-28

## 2023-03-28 RX ORDER — MAGNESIUM SULFATE IN WATER 40 MG/ML
2000 INJECTION, SOLUTION INTRAVENOUS ONCE
Status: COMPLETED | OUTPATIENT
Start: 2023-03-28 | End: 2023-03-28

## 2023-03-28 RX ORDER — 0.9 % SODIUM CHLORIDE 0.9 %
1000 INTRAVENOUS SOLUTION INTRAVENOUS ONCE
Status: COMPLETED | OUTPATIENT
Start: 2023-03-28 | End: 2023-03-28

## 2023-03-28 RX ORDER — ACETAMINOPHEN 500 MG
1000 TABLET ORAL
Status: COMPLETED | OUTPATIENT
Start: 2023-03-28 | End: 2023-03-28

## 2023-03-28 RX ORDER — DIPHENHYDRAMINE HYDROCHLORIDE 50 MG/ML
25 INJECTION INTRAMUSCULAR; INTRAVENOUS ONCE
Status: COMPLETED | OUTPATIENT
Start: 2023-03-28 | End: 2023-03-28

## 2023-03-28 RX ORDER — DEXAMETHASONE SODIUM PHOSPHATE 10 MG/ML
10 INJECTION, SOLUTION INTRAMUSCULAR; INTRAVENOUS ONCE
Status: COMPLETED | OUTPATIENT
Start: 2023-03-28 | End: 2023-03-28

## 2023-03-28 RX ORDER — HALOPERIDOL 5 MG/ML
5 INJECTION INTRAMUSCULAR ONCE
Status: COMPLETED | OUTPATIENT
Start: 2023-03-28 | End: 2023-03-28

## 2023-03-28 RX ADMIN — DIPHENHYDRAMINE HYDROCHLORIDE 25 MG: 50 INJECTION, SOLUTION INTRAMUSCULAR; INTRAVENOUS at 14:55

## 2023-03-28 RX ADMIN — ACETAMINOPHEN 1000 MG: 500 TABLET, FILM COATED ORAL at 14:59

## 2023-03-28 RX ADMIN — KETOROLAC TROMETHAMINE 15 MG: 15 INJECTION, SOLUTION INTRAMUSCULAR; INTRAVENOUS at 14:55

## 2023-03-28 RX ADMIN — HALOPERIDOL LACTATE 5 MG: 5 INJECTION, SOLUTION INTRAMUSCULAR at 17:36

## 2023-03-28 RX ADMIN — METOCLOPRAMIDE 10 MG: 5 INJECTION, SOLUTION INTRAMUSCULAR; INTRAVENOUS at 14:56

## 2023-03-28 RX ADMIN — SODIUM CHLORIDE 1000 ML: 9 INJECTION, SOLUTION INTRAVENOUS at 14:54

## 2023-03-28 RX ADMIN — MAGNESIUM SULFATE HEPTAHYDRATE 2000 MG: 40 INJECTION, SOLUTION INTRAVENOUS at 17:36

## 2023-03-28 RX ADMIN — DEXAMETHASONE SODIUM PHOSPHATE 10 MG: 10 INJECTION, SOLUTION INTRAMUSCULAR; INTRAVENOUS at 17:36

## 2023-03-28 ASSESSMENT — ENCOUNTER SYMPTOMS
SORE THROAT: 0
COUGH: 0
DIARRHEA: 0
BACK PAIN: 0
SHORTNESS OF BREATH: 0
NAUSEA: 1
VOMITING: 0
ABDOMINAL PAIN: 0

## 2023-03-28 ASSESSMENT — PAIN DESCRIPTION - LOCATION
LOCATION: HEAD
LOCATION: HEAD

## 2023-03-28 ASSESSMENT — PAIN DESCRIPTION - FREQUENCY: FREQUENCY: CONTINUOUS

## 2023-03-28 ASSESSMENT — PAIN DESCRIPTION - PAIN TYPE: TYPE: ACUTE PAIN

## 2023-03-28 ASSESSMENT — PAIN SCALES - GENERAL
PAINLEVEL_OUTOF10: 6
PAINLEVEL_OUTOF10: 7

## 2023-03-28 ASSESSMENT — PAIN - FUNCTIONAL ASSESSMENT
PAIN_FUNCTIONAL_ASSESSMENT: 0-10
PAIN_FUNCTIONAL_ASSESSMENT: 0-10
PAIN_FUNCTIONAL_ASSESSMENT: NONE - DENIES PAIN

## 2023-03-28 ASSESSMENT — PAIN DESCRIPTION - DESCRIPTORS
DESCRIPTORS: ACHING
DESCRIPTORS: THROBBING;POUNDING;PRESSURE

## 2023-03-28 NOTE — ED PROVIDER NOTES
[facility] \[facility]\"" ED  eMERGENCYdEPARTMENT eNCOUnter      Pt Name: Holly Xie  MRN: [id number]  [place] 1975  Date of evaluation: 3/28/2023  Sheela Rutledge MD    CHIEF COMPLAINT           HPI  Holly Xie is a 46 y.o. male per chart review has a h/o  Depression, DM II, alcohol abuse, EMANUEL presents to the ED with headache. Pt notes gradual onset, severe, constant, aching, R sided headache since last night.  +N/-v.  Pt denies fever, neck pain, cp, sob, ab pain, dysuria, diarrhea. ROS  Review of Systems   Constitutional:  Negative for activity change, chills and fever. HENT:  Negative for ear pain and sore throat. Eyes:  Negative for visual disturbance. Respiratory:  Negative for cough and shortness of breath. Cardiovascular:  Negative for chest pain, palpitations and leg swelling. Gastrointestinal:  Positive for nausea. Negative for abdominal pain, diarrhea and vomiting. Genitourinary:  Negative for dysuria. Musculoskeletal:  Negative for back pain. Skin:  Negative for rash. Neurological:  Positive for headaches. Negative for dizziness and weakness. Except as noted above the remainder of the review of systems was reviewed and negative. PAST MEDICAL HISTORY     Past Medical History:   Diagnosis Date    Depression     Diabetes mellitus ([facility] [address]     ETOH abuse     Sleep apnea          SURGICAL HISTORY     History reviewed. No pertinent surgical history. CURRENTMEDICATIONS       Previous Medications    ALCOHOL SWABS 70 % PADS    1 box by Does not apply route daily    BLOOD GLUCOSE MONITORING SUPPL (ACCU-CHEK COMPACT CARE KIT) KIT    1 kit by Does not apply route daily    BLOOD GLUCOSE MONITORING SUPPL (ONETOUCH VERIO) W/DEVICE KIT    As directed    BLOOD GLUCOSE TEST STRIPS (ONE TOUCH ULTRA TEST) STRIP    Test TID Dx E11.65    BLOOD GLUCOSE TEST STRIPS (ONETOUCH VERIO) STRIP    1 each by In Vitro route daily As needed.     INSULIN PEN NEEDLE (PEN NEEDLES) 33G X 4 MM

## 2023-03-28 NOTE — ED NOTES
Pt came out of room and said he had a family emergency and had to go   dcd  pts iv       Nazario Obando RN  03/28/23 3494

## 2023-03-28 NOTE — ED TRIAGE NOTES
Pt states headache started yesterday, states he thought he may be getting a sinus infection a couple days ago. States he has never had a headache like this before. C/o light sensitivity.  Speech clear, moves extremities x4, follows commands

## 2023-04-02 ENCOUNTER — HOSPITAL ENCOUNTER (EMERGENCY)
Age: 48
Discharge: HOME | End: 2023-04-02
Payer: MEDICAID

## 2023-04-02 VITALS
TEMPERATURE: 97.7 F | OXYGEN SATURATION: 98 % | BODY MASS INDEX: 32.9 KG/M2 | WEIGHT: 235 LBS | SYSTOLIC BLOOD PRESSURE: 165 MMHG | HEIGHT: 71 IN | HEART RATE: 65 BPM | DIASTOLIC BLOOD PRESSURE: 108 MMHG | RESPIRATION RATE: 18 BRPM

## 2023-04-02 DIAGNOSIS — B34.9 VIRAL ILLNESS: Primary | ICD-10-CM

## 2023-04-02 DIAGNOSIS — R51.9 ACUTE NONINTRACTABLE HEADACHE, UNSPECIFIED HEADACHE TYPE: ICD-10-CM

## 2023-04-02 LAB
INFLUENZA A BY PCR: NEGATIVE
INFLUENZA B BY PCR: NEGATIVE
SARS-COV-2 RDRP RESP QL NAA+PROBE: NOT DETECTED
STREP GRP A PCR: NEGATIVE

## 2023-04-02 PROCEDURE — 87635 SARS-COV-2 COVID-19 AMP PRB: CPT

## 2023-04-02 PROCEDURE — 6370000000 HC RX 637 (ALT 250 FOR IP)

## 2023-04-02 PROCEDURE — 87502 INFLUENZA DNA AMP PROBE: CPT

## 2023-04-02 PROCEDURE — 87651 STREP A DNA AMP PROBE: CPT

## 2023-04-02 PROCEDURE — 99283 EMERGENCY DEPT VISIT LOW MDM: CPT

## 2023-04-02 RX ORDER — METOCLOPRAMIDE 10 MG/1
10 TABLET ORAL ONCE
Status: DISCONTINUED | OUTPATIENT
Start: 2023-04-02 | End: 2023-04-02 | Stop reason: HOSPADM

## 2023-04-02 RX ORDER — IBUPROFEN 400 MG/1
800 TABLET ORAL ONCE
Status: COMPLETED | OUTPATIENT
Start: 2023-04-02 | End: 2023-04-02

## 2023-04-02 RX ADMIN — IBUPROFEN 800 MG: 400 TABLET ORAL at 18:06

## 2023-04-02 ASSESSMENT — ENCOUNTER SYMPTOMS
NAUSEA: 0
COUGH: 0
ABDOMINAL PAIN: 0
EYES NEGATIVE: 1
VOMITING: 0
SHORTNESS OF BREATH: 0
DIARRHEA: 0
SORE THROAT: 0
ABDOMINAL DISTENTION: 0
RHINORRHEA: 0
CHOKING: 0

## 2023-04-02 ASSESSMENT — PAIN DESCRIPTION - LOCATION
LOCATION: OTHER (COMMENT)
LOCATION: OTHER (COMMENT)

## 2023-04-02 ASSESSMENT — PAIN SCALES - GENERAL: PAINLEVEL_OUTOF10: 7

## 2023-04-02 ASSESSMENT — PAIN DESCRIPTION - DESCRIPTORS: DESCRIPTORS: ACHING;DULL

## 2023-04-02 ASSESSMENT — PAIN DESCRIPTION - ORIENTATION: ORIENTATION: OTHER (COMMENT)

## 2023-04-02 ASSESSMENT — PAIN - FUNCTIONAL ASSESSMENT: PAIN_FUNCTIONAL_ASSESSMENT: 0-10

## 2023-04-02 NOTE — ED PROVIDER NOTES
elevated 165/108. O2 98% on RA. Not in acute distress, nontoxic. Lungs CTAB. Physical exam is benign. No neurologic deficits. COVID-negative. Flu negative. Strep negative. Plan was to give ibuprofen and Reglan for headaches and body aches. Patient declines Reglan, states \"it can be addictive and he is a previous addict\". Plan was to prescribe Ibuprofen and Reglan. PT declines. He is afebrile, tolerating p.o., stable able to be discharged. Discussed symptomatic treatment for generalized viral illness. Rest, fluids, ibuprofen and Tylenol at home for aches and pains. He verbalized understanding. He is to follow-up with PCP in 2 to 3 days. FINAL IMPRESSION      1. Viral illness    2.  Acute nonintractable headache, unspecified headache type          DISPOSITION/PLAN   DISPOSITION Decision To Discharge 04/02/2023 06:07:58 PM        DISCHARGE MEDICATIONS:  [unfilled]         Maira Alegria PA-C(electronically signed)  Attending Emergency Physician           Maira Alegria PA-C  04/02/23 2423

## 2023-05-10 ENCOUNTER — HOSPITAL ENCOUNTER (INPATIENT)
Age: 48
LOS: 2 days | Discharge: HOME | End: 2023-05-13
Attending: INTERNAL MEDICINE | Admitting: INTERNAL MEDICINE
Payer: MEDICAID

## 2023-05-10 ENCOUNTER — APPOINTMENT (OUTPATIENT)
Dept: CT IMAGING | Age: 48
End: 2023-05-10
Payer: MEDICAID

## 2023-05-10 ENCOUNTER — APPOINTMENT (OUTPATIENT)
Dept: GENERAL RADIOLOGY | Age: 48
End: 2023-05-10
Payer: MEDICAID

## 2023-05-10 DIAGNOSIS — R41.0 DISORIENTATION: ICD-10-CM

## 2023-05-10 DIAGNOSIS — R07.89 CHEST WALL PAIN: ICD-10-CM

## 2023-05-10 DIAGNOSIS — F43.29 STRESS AND ADJUSTMENT REACTION: ICD-10-CM

## 2023-05-10 DIAGNOSIS — F41.1 ANXIETY STATE: ICD-10-CM

## 2023-05-10 DIAGNOSIS — R41.82 ALTERED MENTAL STATUS, UNSPECIFIED ALTERED MENTAL STATUS TYPE: Primary | ICD-10-CM

## 2023-05-10 DIAGNOSIS — F10.931 ALCOHOL WITHDRAWAL SYNDROME, WITH DELIRIUM (HCC): ICD-10-CM

## 2023-05-10 LAB
ALBUMIN SERPL-MCNC: 4.7 G/DL (ref 3.5–4.6)
ALP SERPL-CCNC: 155 U/L (ref 35–104)
ALT SERPL-CCNC: 70 U/L (ref 0–41)
ANION GAP SERPL CALCULATED.3IONS-SCNC: 15 MEQ/L (ref 9–15)
APAP SERPL-MCNC: <5 UG/ML (ref 10–30)
AST SERPL-CCNC: 35 U/L (ref 0–40)
BASOPHILS # BLD: 0.1 K/UL (ref 0–0.2)
BASOPHILS NFR BLD: 0.9 %
BILIRUB SERPL-MCNC: 1 MG/DL (ref 0.2–0.7)
BUN SERPL-MCNC: 15 MG/DL (ref 6–20)
CALCIUM SERPL-MCNC: 9.9 MG/DL (ref 8.5–9.9)
CHLORIDE SERPL-SCNC: 101 MEQ/L (ref 95–107)
CHOLEST SERPL-MCNC: 216 MG/DL (ref 0–199)
CK SERPL-CCNC: 209 U/L (ref 0–190)
CO2 SERPL-SCNC: 22 MEQ/L (ref 20–31)
CREAT SERPL-MCNC: 0.95 MG/DL (ref 0.7–1.2)
D DIMER PPP FEU-MCNC: <0.27 MG/L FEU (ref 0–0.5)
EOSINOPHIL # BLD: 0.1 K/UL (ref 0–0.7)
EOSINOPHIL NFR BLD: 1.7 %
ERYTHROCYTE [DISTWIDTH] IN BLOOD BY AUTOMATED COUNT: 13.2 % (ref 11.5–14.5)
ETHANOL PERCENT: NORMAL G/DL
ETHANOLAMINE SERPL-MCNC: <10 MG/DL (ref 0–0.08)
GLOBULIN SER CALC-MCNC: 2.3 G/DL (ref 2.3–3.5)
GLUCOSE SERPL-MCNC: 208 MG/DL (ref 70–99)
HCT VFR BLD AUTO: 41.7 % (ref 42–52)
HDLC SERPL-MCNC: 30 MG/DL (ref 40–59)
HGB BLD-MCNC: 14.7 G/DL (ref 14–18)
LDLC SERPL CALC-MCNC: 152 MG/DL (ref 0–129)
LYMPHOCYTES # BLD: 1.5 K/UL (ref 1–4.8)
LYMPHOCYTES NFR BLD: 18.2 %
MAGNESIUM SERPL-MCNC: 2 MG/DL (ref 1.7–2.4)
MCH RBC QN AUTO: 30.2 PG (ref 27–31.3)
MCHC RBC AUTO-ENTMCNC: 35.3 % (ref 33–37)
MCV RBC AUTO: 85.8 FL (ref 79–92.2)
MONOCYTES # BLD: 0.5 K/UL (ref 0.2–0.8)
MONOCYTES NFR BLD: 6.1 %
NEUTROPHILS # BLD: 5.9 K/UL (ref 1.4–6.5)
NEUTS SEG NFR BLD: 73.1 %
PLATELET # BLD AUTO: 274 K/UL (ref 130–400)
POTASSIUM SERPL-SCNC: 3.6 MEQ/L (ref 3.4–4.9)
PROT SERPL-MCNC: 7 G/DL (ref 6.3–8)
RBC # BLD AUTO: 4.86 M/UL (ref 4.7–6.1)
SALICYLATES SERPL-MCNC: <0.3 MG/DL (ref 15–30)
SARS-COV-2 RDRP RESP QL NAA+PROBE: NOT DETECTED
SODIUM SERPL-SCNC: 138 MEQ/L (ref 135–144)
TRIGL SERPL-MCNC: 171 MG/DL (ref 0–150)
TROPONIN T SERPL-MCNC: <0.01 NG/ML (ref 0–0.01)
WBC # BLD AUTO: 8 K/UL (ref 4.8–10.8)

## 2023-05-10 PROCEDURE — 87635 SARS-COV-2 COVID-19 AMP PRB: CPT

## 2023-05-10 PROCEDURE — 96375 TX/PRO/DX INJ NEW DRUG ADDON: CPT

## 2023-05-10 PROCEDURE — 6360000002 HC RX W HCPCS

## 2023-05-10 PROCEDURE — 80179 DRUG ASSAY SALICYLATE: CPT

## 2023-05-10 PROCEDURE — 70450 CT HEAD/BRAIN W/O DYE: CPT

## 2023-05-10 PROCEDURE — 93005 ELECTROCARDIOGRAM TRACING: CPT

## 2023-05-10 PROCEDURE — 82550 ASSAY OF CK (CPK): CPT

## 2023-05-10 PROCEDURE — 84484 ASSAY OF TROPONIN QUANT: CPT

## 2023-05-10 PROCEDURE — 82077 ASSAY SPEC XCP UR&BREATH IA: CPT

## 2023-05-10 PROCEDURE — 6370000000 HC RX 637 (ALT 250 FOR IP)

## 2023-05-10 PROCEDURE — 99285 EMERGENCY DEPT VISIT HI MDM: CPT

## 2023-05-10 PROCEDURE — 83735 ASSAY OF MAGNESIUM: CPT

## 2023-05-10 PROCEDURE — 80307 DRUG TEST PRSMV CHEM ANLYZR: CPT

## 2023-05-10 PROCEDURE — 80053 COMPREHEN METABOLIC PANEL: CPT

## 2023-05-10 PROCEDURE — 80061 LIPID PANEL: CPT

## 2023-05-10 PROCEDURE — 85379 FIBRIN DEGRADATION QUANT: CPT

## 2023-05-10 PROCEDURE — 81001 URINALYSIS AUTO W/SCOPE: CPT

## 2023-05-10 PROCEDURE — 36415 COLL VENOUS BLD VENIPUNCTURE: CPT

## 2023-05-10 PROCEDURE — 80143 DRUG ASSAY ACETAMINOPHEN: CPT

## 2023-05-10 PROCEDURE — 71045 X-RAY EXAM CHEST 1 VIEW: CPT

## 2023-05-10 PROCEDURE — 96366 THER/PROPH/DIAG IV INF ADDON: CPT

## 2023-05-10 PROCEDURE — 85025 COMPLETE CBC W/AUTO DIFF WBC: CPT

## 2023-05-10 RX ORDER — HYDROXYZINE HYDROCHLORIDE 25 MG/1
50 TABLET, FILM COATED ORAL ONCE
Status: COMPLETED | OUTPATIENT
Start: 2023-05-10 | End: 2023-05-10

## 2023-05-10 RX ORDER — KETOROLAC TROMETHAMINE 15 MG/ML
15 INJECTION, SOLUTION INTRAMUSCULAR; INTRAVENOUS ONCE
Status: COMPLETED | OUTPATIENT
Start: 2023-05-10 | End: 2023-05-10

## 2023-05-10 RX ADMIN — HYDROXYZINE HYDROCHLORIDE 50 MG: 25 TABLET, FILM COATED ORAL at 21:23

## 2023-05-10 RX ADMIN — KETOROLAC TROMETHAMINE 15 MG: 15 INJECTION, SOLUTION INTRAMUSCULAR; INTRAVENOUS at 21:30

## 2023-05-10 ASSESSMENT — PAIN SCALES - GENERAL: PAINLEVEL_OUTOF10: 4

## 2023-05-10 ASSESSMENT — PAIN - FUNCTIONAL ASSESSMENT: PAIN_FUNCTIONAL_ASSESSMENT: 0-10

## 2023-05-10 ASSESSMENT — PAIN DESCRIPTION - LOCATION: LOCATION: CHEST

## 2023-05-11 PROBLEM — G93.40 ENCEPHALOPATHY ACUTE: Status: ACTIVE | Noted: 2023-05-11

## 2023-05-11 LAB
AMMONIA PLAS-SCNC: 28 UMOL/L (ref 16–60)
AMPHET UR QL SCN: ABNORMAL
BACTERIA URNS QL MICRO: NEGATIVE /HPF
BARBITURATES UR QL SCN: ABNORMAL
BENZODIAZ UR QL SCN: ABNORMAL
BILIRUB UR QL STRIP: NEGATIVE
CANNABINOIDS UR QL SCN: POSITIVE
CLARITY UR: CLEAR
COCAINE UR QL SCN: ABNORMAL
COLOR UR: YELLOW
DRUG SCREEN COMMENT UR-IMP: ABNORMAL
EPI CELLS #/AREA URNS AUTO: NORMAL /HPF (ref 0–5)
FENTANYL SCREEN, URINE: ABNORMAL
GLUCOSE BLD-MCNC: 165 MG/DL (ref 70–99)
GLUCOSE BLD-MCNC: 319 MG/DL (ref 70–99)
GLUCOSE BLD-MCNC: 321 MG/DL (ref 70–99)
GLUCOSE BLD-MCNC: 93 MG/DL (ref 70–99)
GLUCOSE UR STRIP-MCNC: >=1000 MG/DL
HGB UR QL STRIP: NEGATIVE
HYALINE CASTS #/AREA URNS AUTO: NORMAL /HPF (ref 0–5)
KETONES UR STRIP-MCNC: 15 MG/DL
LEUKOCYTE ESTERASE UR QL STRIP: NEGATIVE
METHADONE UR QL SCN: ABNORMAL
NITRITE UR QL STRIP: NEGATIVE
OPIATES UR QL SCN: ABNORMAL
OXYCODONE UR QL SCN: ABNORMAL
PCP UR QL SCN: ABNORMAL
PERFORMED ON: ABNORMAL
PERFORMED ON: NORMAL
PH UR STRIP: 6 [PH] (ref 5–9)
PROPOXYPH UR QL SCN: ABNORMAL
PROT UR STRIP-MCNC: 30 MG/DL
RBC #/AREA URNS AUTO: NORMAL /HPF (ref 0–5)
SP GR UR STRIP: 1.03 (ref 1–1.03)
UROBILINOGEN UR STRIP-ACNC: 1 E.U./DL
WBC #/AREA URNS AUTO: NORMAL /HPF (ref 0–5)

## 2023-05-11 PROCEDURE — 2580000003 HC RX 258: Performed by: INTERNAL MEDICINE

## 2023-05-11 PROCEDURE — 2500000003 HC RX 250 WO HCPCS

## 2023-05-11 PROCEDURE — 2580000003 HC RX 258: Performed by: STUDENT IN AN ORGANIZED HEALTH CARE EDUCATION/TRAINING PROGRAM

## 2023-05-11 PROCEDURE — 2580000003 HC RX 258

## 2023-05-11 PROCEDURE — 1210000000 HC MED SURG R&B

## 2023-05-11 PROCEDURE — 96365 THER/PROPH/DIAG IV INF INIT: CPT

## 2023-05-11 PROCEDURE — 96375 TX/PRO/DX INJ NEW DRUG ADDON: CPT

## 2023-05-11 PROCEDURE — 6360000002 HC RX W HCPCS

## 2023-05-11 PROCEDURE — 36415 COLL VENOUS BLD VENIPUNCTURE: CPT

## 2023-05-11 PROCEDURE — 82140 ASSAY OF AMMONIA: CPT

## 2023-05-11 PROCEDURE — 6370000000 HC RX 637 (ALT 250 FOR IP): Performed by: INTERNAL MEDICINE

## 2023-05-11 PROCEDURE — 96366 THER/PROPH/DIAG IV INF ADDON: CPT

## 2023-05-11 PROCEDURE — 6360000002 HC RX W HCPCS: Performed by: STUDENT IN AN ORGANIZED HEALTH CARE EDUCATION/TRAINING PROGRAM

## 2023-05-11 PROCEDURE — 6370000000 HC RX 637 (ALT 250 FOR IP): Performed by: STUDENT IN AN ORGANIZED HEALTH CARE EDUCATION/TRAINING PROGRAM

## 2023-05-11 RX ORDER — SODIUM CHLORIDE 0.9 % (FLUSH) 0.9 %
5-40 SYRINGE (ML) INJECTION PRN
Status: DISCONTINUED | OUTPATIENT
Start: 2023-05-11 | End: 2023-05-13 | Stop reason: HOSPADM

## 2023-05-11 RX ORDER — LORAZEPAM 2 MG/ML
3 INJECTION INTRAMUSCULAR
Status: DISCONTINUED | OUTPATIENT
Start: 2023-05-11 | End: 2023-05-12

## 2023-05-11 RX ORDER — SODIUM CHLORIDE 9 MG/ML
INJECTION, SOLUTION INTRAVENOUS PRN
Status: DISCONTINUED | OUTPATIENT
Start: 2023-05-11 | End: 2023-05-11 | Stop reason: SDUPTHER

## 2023-05-11 RX ORDER — INSULIN LISPRO 100 [IU]/ML
0-4 INJECTION, SOLUTION INTRAVENOUS; SUBCUTANEOUS NIGHTLY
Status: DISCONTINUED | OUTPATIENT
Start: 2023-05-11 | End: 2023-05-13 | Stop reason: HOSPADM

## 2023-05-11 RX ORDER — ACETAMINOPHEN 325 MG/1
650 TABLET ORAL EVERY 6 HOURS PRN
Status: DISCONTINUED | OUTPATIENT
Start: 2023-05-11 | End: 2023-05-13 | Stop reason: HOSPADM

## 2023-05-11 RX ORDER — HYDROXYZINE HYDROCHLORIDE 10 MG/1
25 TABLET, FILM COATED ORAL 3 TIMES DAILY PRN
Status: DISCONTINUED | OUTPATIENT
Start: 2023-05-11 | End: 2023-05-12

## 2023-05-11 RX ORDER — LORAZEPAM 1 MG/1
4 TABLET ORAL
Status: DISCONTINUED | OUTPATIENT
Start: 2023-05-11 | End: 2023-05-12

## 2023-05-11 RX ORDER — DEXTROSE MONOHYDRATE 100 MG/ML
INJECTION, SOLUTION INTRAVENOUS CONTINUOUS PRN
Status: DISCONTINUED | OUTPATIENT
Start: 2023-05-11 | End: 2023-05-13 | Stop reason: HOSPADM

## 2023-05-11 RX ORDER — SODIUM CHLORIDE 0.9 % (FLUSH) 0.9 %
5-40 SYRINGE (ML) INJECTION EVERY 12 HOURS SCHEDULED
Status: DISCONTINUED | OUTPATIENT
Start: 2023-05-11 | End: 2023-05-13 | Stop reason: HOSPADM

## 2023-05-11 RX ORDER — LORAZEPAM 2 MG/ML
1 INJECTION INTRAMUSCULAR
Status: DISCONTINUED | OUTPATIENT
Start: 2023-05-11 | End: 2023-05-12

## 2023-05-11 RX ORDER — ACETAMINOPHEN 650 MG/1
650 SUPPOSITORY RECTAL EVERY 6 HOURS PRN
Status: DISCONTINUED | OUTPATIENT
Start: 2023-05-11 | End: 2023-05-13 | Stop reason: HOSPADM

## 2023-05-11 RX ORDER — ONDANSETRON 2 MG/ML
4 INJECTION INTRAMUSCULAR; INTRAVENOUS EVERY 6 HOURS PRN
Status: DISCONTINUED | OUTPATIENT
Start: 2023-05-11 | End: 2023-05-13 | Stop reason: HOSPADM

## 2023-05-11 RX ORDER — LORAZEPAM 2 MG/ML
2 INJECTION INTRAMUSCULAR
Status: DISCONTINUED | OUTPATIENT
Start: 2023-05-11 | End: 2023-05-12

## 2023-05-11 RX ORDER — MULTIVITAMIN WITH IRON
1 TABLET ORAL DAILY
Status: DISCONTINUED | OUTPATIENT
Start: 2023-05-11 | End: 2023-05-13 | Stop reason: HOSPADM

## 2023-05-11 RX ORDER — INSULIN LISPRO 100 [IU]/ML
0-4 INJECTION, SOLUTION INTRAVENOUS; SUBCUTANEOUS
Status: DISCONTINUED | OUTPATIENT
Start: 2023-05-11 | End: 2023-05-13 | Stop reason: HOSPADM

## 2023-05-11 RX ORDER — SODIUM CHLORIDE 9 MG/ML
INJECTION, SOLUTION INTRAVENOUS PRN
Status: DISCONTINUED | OUTPATIENT
Start: 2023-05-11 | End: 2023-05-13 | Stop reason: HOSPADM

## 2023-05-11 RX ORDER — LORAZEPAM 1 MG/1
2 TABLET ORAL
Status: DISCONTINUED | OUTPATIENT
Start: 2023-05-11 | End: 2023-05-12

## 2023-05-11 RX ORDER — ENOXAPARIN SODIUM 100 MG/ML
40 INJECTION SUBCUTANEOUS DAILY
Status: DISCONTINUED | OUTPATIENT
Start: 2023-05-12 | End: 2023-05-13 | Stop reason: HOSPADM

## 2023-05-11 RX ORDER — LANOLIN ALCOHOL/MO/W.PET/CERES
100 CREAM (GRAM) TOPICAL DAILY
Status: DISCONTINUED | OUTPATIENT
Start: 2023-05-11 | End: 2023-05-13 | Stop reason: HOSPADM

## 2023-05-11 RX ORDER — POLYETHYLENE GLYCOL 3350 17 G/17G
17 POWDER, FOR SOLUTION ORAL DAILY PRN
Status: DISCONTINUED | OUTPATIENT
Start: 2023-05-11 | End: 2023-05-13 | Stop reason: HOSPADM

## 2023-05-11 RX ORDER — LORAZEPAM 1 MG/1
1 TABLET ORAL
Status: DISCONTINUED | OUTPATIENT
Start: 2023-05-11 | End: 2023-05-12

## 2023-05-11 RX ORDER — ONDANSETRON 4 MG/1
4 TABLET, ORALLY DISINTEGRATING ORAL EVERY 8 HOURS PRN
Status: DISCONTINUED | OUTPATIENT
Start: 2023-05-11 | End: 2023-05-13 | Stop reason: HOSPADM

## 2023-05-11 RX ORDER — LORAZEPAM 2 MG/ML
4 INJECTION INTRAMUSCULAR
Status: DISCONTINUED | OUTPATIENT
Start: 2023-05-11 | End: 2023-05-12

## 2023-05-11 RX ORDER — BUSPIRONE HYDROCHLORIDE 5 MG/1
5 TABLET ORAL 3 TIMES DAILY
Status: ON HOLD | COMMUNITY
End: 2023-05-13 | Stop reason: HOSPADM

## 2023-05-11 RX ORDER — BUSPIRONE HYDROCHLORIDE 7.5 MG/1
7.5 TABLET ORAL 3 TIMES DAILY
Status: DISCONTINUED | OUTPATIENT
Start: 2023-05-11 | End: 2023-05-12

## 2023-05-11 RX ORDER — LORAZEPAM 1 MG/1
3 TABLET ORAL
Status: DISCONTINUED | OUTPATIENT
Start: 2023-05-11 | End: 2023-05-12

## 2023-05-11 RX ADMIN — Medication 10 ML: at 22:17

## 2023-05-11 RX ADMIN — BUSPIRONE HYDROCHLORIDE 7.5 MG: 7.5 TABLET ORAL at 21:38

## 2023-05-11 RX ADMIN — INSULIN LISPRO 3 UNITS: 100 INJECTION, SOLUTION INTRAVENOUS; SUBCUTANEOUS at 13:35

## 2023-05-11 RX ADMIN — ACETAMINOPHEN 650 MG: 325 TABLET ORAL at 18:06

## 2023-05-11 RX ADMIN — THERA TABS 1 TABLET: TAB at 10:19

## 2023-05-11 RX ADMIN — Medication 100 MG: at 13:35

## 2023-05-11 RX ADMIN — Medication 10 ML: at 13:36

## 2023-05-11 RX ADMIN — LORAZEPAM 2 MG: 2 INJECTION INTRAMUSCULAR; INTRAVENOUS at 09:47

## 2023-05-11 RX ADMIN — BUSPIRONE HYDROCHLORIDE 7.5 MG: 7.5 TABLET ORAL at 16:36

## 2023-05-11 RX ADMIN — THIAMINE HYDROCHLORIDE: 100 INJECTION, SOLUTION INTRAMUSCULAR; INTRAVENOUS at 05:36

## 2023-05-11 ASSESSMENT — ENCOUNTER SYMPTOMS
VOMITING: 0
NAUSEA: 0
SHORTNESS OF BREATH: 0
ABDOMINAL PAIN: 0
PHOTOPHOBIA: 0
DIARRHEA: 0
COUGH: 0

## 2023-05-11 ASSESSMENT — PAIN SCALES - GENERAL: PAINLEVEL_OUTOF10: 0

## 2023-05-11 NOTE — ED NOTES
Patient has been awake. He is confused. Not oriented to place or time. Attempt to follow steps to provide a urine sample but becomes disoriented and has difficulty following simple directions. Posturing, raising his hands in the air, restless. Not able to participate in psychiatric assessment at this time. Hilary Hoffmann will move him to main ER for further evaluation and treatment.            Nichole Evans RN  05/11/23 7460

## 2023-05-11 NOTE — ED NOTES
Pt able to use urinal at bedside. Pt has steady gait.  Pt provided with breakfast pattie Parker RN  05/11/23 2336

## 2023-05-11 NOTE — H&P
cooperative  Skin: Skin color, texture, turgor normal.   HEENT: Head: Normocephalic, no lesions, without obvious abnormality. Eye: Normal external eye, conjunctiva, lids cornea, SHELLY. Neck: supple, symmetrical, trachea midline  Lungs: clear to auscultation bilaterally  Heart: S1/S2,RRR  Abdomen: soft, active BS  Extremities: no edema  Neurologic: awake, cooperative, no gross motor deficits    Recent Labs     05/10/23  2130   WBC 8.0   HGB 14.7        Recent Labs     05/10/23  2130      K 3.6      CO2 22   BUN 15   CREATININE 0.95   GLUCOSE 208*   AST 35   ALT 70*   BILITOT 1.0*   ALKPHOS 155*     Troponin T:   Recent Labs     05/10/23  2130   Gracy Racer <0.010       ABGs:   Lab Results   Component Value Date/Time    PHART 7.416 02/10/2020 11:09 PM    PO2ART 90 02/10/2020 11:09 PM    AOD3CHH 36 02/10/2020 11:09 PM     INR: No results for input(s): INR in the last 72 hours. URINALYSIS:  Recent Labs     05/10/23  2130   COLORU Yellow   PHUR 6.0   WBCUA 0-2   RBCUA 0-2   BACTERIA Negative   CLARITYU Clear   SPECGRAV 1.027   LEUKOCYTESUR Negative   UROBILINOGEN 1.0   BILIRUBINUR Negative   BLOODU Negative   GLUCOSEU >=1000*     -----------------------------------------------------------------   CT HEAD WO CONTRAST    Result Date: 5/11/2023  EXAMINATION: CT OF THE HEAD WITHOUT CONTRAST  5/10/2023 9:40 pm TECHNIQUE: CT of the head was performed without the administration of intravenous contrast. Automated exposure control, iterative reconstruction, and/or weight based adjustment of the mA/kV was utilized to reduce the radiation dose to as low as reasonably achievable. COMPARISON: None. HISTORY: ORDERING SYSTEM PROVIDED HISTORY: confusion, agitation TECHNOLOGIST PROVIDED HISTORY: Reason for exam:->confusion, agitation Has a \"code stroke\" or \"stroke alert\" been called? ->No Decision Support Exception - unselect if not a suspected or confirmed emergency medical condition->Emergency Medical Condition (MA)

## 2023-05-11 NOTE — ED NOTES
Patient appears to be  sleeping respirations are even and unlabored no distress noted at this time.        Mary Kay Adkins RN  05/10/23 7130

## 2023-05-11 NOTE — ED NOTES
Patient up to the bathroom still not able to provide urine sample at this time   Patient now sitting on the bed  juice provided     Debby Sagastume RN  05/11/23 5664

## 2023-05-11 NOTE — ED TRIAGE NOTES
Pt was brought in by his sister (pt is pacing around in triage and in the waiting room)  Pt states his chest hurts   Pt has a hx of anxiety   Pt has been having anxiety   Pt is diaphoretic  Pt hasn't been sleeping   Pt lives at home with his 13year old daughter   Pt denies si/hi  Pt is afebrile   Per pt's sister he isn't making sense (states she had a hard time getting a hold of him today)

## 2023-05-11 NOTE — ED NOTES
Pt having hallucinations. Pt speaking to his sister that is not in the room. Pt has increase in tremors.       Monserrat Garcia, CHAR  05/11/23 2207

## 2023-05-11 NOTE — ED NOTES
Key found on table in room when clean ER room 2  Key handed to sister of patient      Ervin Fuller RN  05/10/23 6202

## 2023-05-11 NOTE — ED PROVIDER NOTES
[address] [facility] ED  eMERGENCY dEPARTMENT eNCOUnter      Pt Name: Palomo Wiley  MRN: [id number]  [place] 1975  Date of evaluation: 5/10/2023  Provider: RAJESH Gibson        HISTORY OF PRESENT ILLNESS    Palomo Wiley is a 46 y.o. male per chart review has ah/o anxiety, T2DM. Patient presents to the emergency room for acute anxiety state. Patient has history of anxiety, he presents with family member, states that he really has not been himself for the past several days. States does not seem like his typical anxiety. Reportedly he recently lost his mother and this has been an acute stressor for him. Per family member, he has not been oriented or acting right. He has not been sleeping, eating, bathing or taking care of himself. No VAH, HI/SI, psychosis hx. Has been admitted for psychiatric concerns in the past, per family. Patient had also come in due to left-sided chest pain complaints, states hurts to touch. No radiation, no SOB, n/v, diaphoresis, cardiopulmonary history. REVIEW OF SYSTEMS       Review of Systems   Constitutional:  Negative for chills and fever. HENT:  Negative for congestion. Eyes:  Negative for photophobia. Respiratory:  Negative for cough and shortness of breath. Cardiovascular:  Negative for chest pain. Gastrointestinal:  Negative for abdominal pain, diarrhea, nausea and vomiting. Genitourinary:  Negative for difficulty urinating. Musculoskeletal:  Negative for myalgias, neck pain and neck stiffness. Neurological:  Negative for dizziness, tremors, seizures, syncope, facial asymmetry, speech difficulty, weakness, light-headedness, numbness and headaches. Psychiatric/Behavioral:  Positive for behavioral problems and sleep disturbance. Negative for confusion. The patient is nervous/anxious. Except as noted above the remainder of the review of systems was reviewed and negative.        PAST MEDICAL HISTORY     Past Medical History:   Diagnosis

## 2023-05-11 NOTE — ED NOTES
Patient changed into psych safe clothing. Reminded to provide a urine sample. Covid test competed in main ED. Skin and cobtraband check performed. Contraband check negative at this time.       Juma Griffin RN  05/10/23 0347

## 2023-05-11 NOTE — ED NOTES
Pt arrived with family for complaints of altered mental status, anxiety and chest pain. Per family he was recently prescribed buspirone for anxiety but it doesn't seem to be helping. Family stated that he hasn't been sleeping for the past few day and that he is altered. Pt doesn't know the year or president, pt is oriented to himself. Family stated that they recently lost their mother about two weeks ago and that it has been causing more stress. Pt is anxious in the room. Pt given water. EKG performed.  Will continue plan of care       Paul Baltazar RN  05/10/23 0124

## 2023-05-11 NOTE — CARE COORDINATION
Case Management Assessment  Initial Evaluation    Date/Time of Evaluation: 5/11/2023 12:50 PM  Assessment Completed by: Piotr Lee RN    If patient is discharged prior to next notation, then this note serves as note for discharge by case management. Patient Name: Tameka Mullins                   YOB: 1975  Diagnosis: Chest wall pain [R07.89]  Anxiety state [F41.1]  Encephalopathy acute [G93.40]  Disorientation [R41.0]  Stress and adjustment reaction [F43.29]  Alcohol withdrawal syndrome, with delirium ([facility] [address] [F10.931]  Altered mental status, unspecified altered mental status type [R41.82]                   Date / Time: 5/10/2023  8:41 PM    Patient Admission Status: Inpatient   Readmission Risk (Low < 19, Mod (19-27), High > 27): Readmission Risk Score: 10.3    Current PCP: No primary care provider on file. PCP verified by CM? (P) Yes ([facility])    Chart Reviewed: Yes      History Provided by: (P) Patient  Patient Orientation: (P) Alert and Oriented, Person, Place, Situation, Self    Patient Cognition: (P) Alert    Hospitalization in the last 30 days (Readmission):  No    If yes, Readmission Assessment in CM Navigator will be completed.     Advance Directives:      Code Status: Full Code   Patient's Primary Decision Maker is: (P) Legal Next of Kin (sisters Bell Gonzalez and Angela Cook are listed)      Discharge Planning:    Patient lives with: (P) Children Type of Home: (P) House  Primary Care Giver: (P) Self  Patient Support Systems include: (P) Children   Current Financial resources: (P) Medicaid  Current community resources: (P) None  Current services prior to admission: (P) None            Current DME:              Type of Home Care services:       ADLS  Prior functional level: (P) Independent in ADLs/IADLs  Current functional level: (P) Independent in ADLs/IADLs    PT AM-PAC:   /24  OT AM-PAC:   /24    Family can provide assistance at DC: (P) Yes  Would you like Case Management to discuss the discharge

## 2023-05-11 NOTE — ED NOTES
FAMILY COLLATERAL NOTE    Family/Support Name: Lelia Hector #: [phone]  Relationship to Pt[de-identified] Sister    Family/Support contact aware of hospitalization: Patients sister brought Frankie Niño to the ER because he has not been acting right. Has extreme anxiety, panic, chest pains, Not oriented to place and time. He began to have chest pain, so was cleared in main ER before admission to the [facility]    Presenting Symptoms/Current Concerns: For the past 2-3 week, patient has had extreme anxiety. Recently having panic attacks, clenching fist, restlessness, inability to focus, not oriented to place or time. His sister became concerned when he stopped answering her calls. She found him rocking, rigid, seating, flushed and anxious. He holds his breath and clenches his fists. He has not been eating or sleeping for several days. He is smoking excessive amounts of black & milds and wondered if he had nicotine toxicity. His sister thought he may be getting psychotic. He said to her, \" I am so afraid, please help me\"     Stressors:   Patient's mother passed away in February. He may have some guilt because they were estranged. He has been physically sick and his car broke down. Background History Relevant to Current Hospitalization:  Patient lived in [facility] [place] for his entire life. He graduated high school and worked a variety of jobs but never a profession. 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 is . Has 5 children, 4 are grown, one 13year old in the home. Patient has a history of addiction with weed and alcohol. No other drugs that she knows of. Takes antabuse for history of abuse. He was admitted here 6-7 years ago for anxiety. Discharge Plan: To be evaluated in [facility] His sister would like to be updated at the number above.      CHAR Alexander RN  05/11/23 0000

## 2023-05-11 NOTE — ED NOTES
Report given to Rethink. All questions answered at this time.       Aide Chinchilla RN  05/11/23 5099

## 2023-05-12 PROBLEM — F41.1 GAD (GENERALIZED ANXIETY DISORDER): Status: ACTIVE | Noted: 2023-05-12

## 2023-05-12 LAB
EKG ATRIAL RATE: 104 BPM
EKG P AXIS: 30 DEGREES
EKG P-R INTERVAL: 158 MS
EKG Q-T INTERVAL: 352 MS
EKG QRS DURATION: 100 MS
EKG QTC CALCULATION (BAZETT): 462 MS
EKG R AXIS: 68 DEGREES
EKG T AXIS: 72 DEGREES
EKG VENTRICULAR RATE: 104 BPM
GLUCOSE BLD-MCNC: 144 MG/DL (ref 70–99)
GLUCOSE BLD-MCNC: 153 MG/DL (ref 70–99)
GLUCOSE BLD-MCNC: 191 MG/DL (ref 70–99)
GLUCOSE BLD-MCNC: 206 MG/DL (ref 70–99)
HBA1C MFR BLD: 8.7 % (ref 4.8–5.9)
PERFORMED ON: ABNORMAL

## 2023-05-12 PROCEDURE — 6370000000 HC RX 637 (ALT 250 FOR IP): Performed by: STUDENT IN AN ORGANIZED HEALTH CARE EDUCATION/TRAINING PROGRAM

## 2023-05-12 PROCEDURE — 6370000000 HC RX 637 (ALT 250 FOR IP): Performed by: PSYCHIATRY & NEUROLOGY

## 2023-05-12 PROCEDURE — 2580000003 HC RX 258: Performed by: INTERNAL MEDICINE

## 2023-05-12 PROCEDURE — 36415 COLL VENOUS BLD VENIPUNCTURE: CPT

## 2023-05-12 PROCEDURE — 83036 HEMOGLOBIN GLYCOSYLATED A1C: CPT

## 2023-05-12 PROCEDURE — 6370000000 HC RX 637 (ALT 250 FOR IP): Performed by: INTERNAL MEDICINE

## 2023-05-12 PROCEDURE — 6360000002 HC RX W HCPCS: Performed by: INTERNAL MEDICINE

## 2023-05-12 PROCEDURE — 1210000000 HC MED SURG R&B

## 2023-05-12 PROCEDURE — 93010 ELECTROCARDIOGRAM REPORT: CPT | Performed by: INTERNAL MEDICINE

## 2023-05-12 RX ORDER — GABAPENTIN 100 MG/1
100 CAPSULE ORAL 3 TIMES DAILY
Status: DISCONTINUED | OUTPATIENT
Start: 2023-05-12 | End: 2023-05-13 | Stop reason: HOSPADM

## 2023-05-12 RX ORDER — HYDROXYZINE HYDROCHLORIDE 25 MG/1
50 TABLET, FILM COATED ORAL 3 TIMES DAILY PRN
Status: DISCONTINUED | OUTPATIENT
Start: 2023-05-12 | End: 2023-05-13 | Stop reason: HOSPADM

## 2023-05-12 RX ORDER — PAROXETINE 10 MG/1
10 TABLET, FILM COATED ORAL DAILY
Status: DISCONTINUED | OUTPATIENT
Start: 2023-05-12 | End: 2023-05-13 | Stop reason: HOSPADM

## 2023-05-12 RX ORDER — BUSPIRONE HYDROCHLORIDE 7.5 MG/1
15 TABLET ORAL 3 TIMES DAILY
Status: DISCONTINUED | OUTPATIENT
Start: 2023-05-12 | End: 2023-05-13 | Stop reason: HOSPADM

## 2023-05-12 RX ADMIN — HYDROXYZINE HYDROCHLORIDE 25 MG: 10 TABLET ORAL at 07:55

## 2023-05-12 RX ADMIN — PAROXETINE 10 MG: 10 TABLET, FILM COATED ORAL at 16:07

## 2023-05-12 RX ADMIN — THERA TABS 1 TABLET: TAB at 09:33

## 2023-05-12 RX ADMIN — BUSPIRONE HYDROCHLORIDE 15 MG: 7.5 TABLET ORAL at 20:55

## 2023-05-12 RX ADMIN — BUSPIRONE HYDROCHLORIDE 7.5 MG: 7.5 TABLET ORAL at 13:27

## 2023-05-12 RX ADMIN — BUSPIRONE HYDROCHLORIDE 7.5 MG: 7.5 TABLET ORAL at 09:33

## 2023-05-12 RX ADMIN — GABAPENTIN 100 MG: 100 CAPSULE ORAL at 16:07

## 2023-05-12 RX ADMIN — Medication 10 ML: at 20:53

## 2023-05-12 RX ADMIN — GABAPENTIN 100 MG: 100 CAPSULE ORAL at 20:54

## 2023-05-12 RX ADMIN — Medication 10 ML: at 09:47

## 2023-05-12 RX ADMIN — HYDROXYZINE HYDROCHLORIDE 25 MG: 10 TABLET ORAL at 14:51

## 2023-05-12 RX ADMIN — LORAZEPAM 2 MG: 1 TABLET ORAL at 03:35

## 2023-05-12 RX ADMIN — ENOXAPARIN SODIUM 40 MG: 100 INJECTION SUBCUTANEOUS at 09:34

## 2023-05-12 RX ADMIN — Medication 100 MG: at 09:33

## 2023-05-12 RX ADMIN — LORAZEPAM 3 MG: 1 TABLET ORAL at 09:45

## 2023-05-12 NOTE — PROGRESS NOTES
Hospitalist Progress Note      PCP: No primary care provider on file. Date of Admission: 5/10/2023    Chief Complaint:  no acute events, afebrile, stable HD, is experiencing anxiety and tremors requiring Lorazepam per CIWA score of 16 per nursing staff    Medications:  Reviewed    Infusion Medications    dextrose      sodium chloride       Scheduled Medications    sodium chloride flush  5-40 mL IntraVENous 2 times per day    thiamine  100 mg Oral Daily    multivitamin  1 tablet Oral Daily    sodium chloride flush  5-40 mL IntraVENous 2 times per day    enoxaparin  40 mg SubCUTAneous Daily    insulin lispro  0-4 Units SubCUTAneous TID WC    insulin lispro  0-4 Units SubCUTAneous Nightly    busPIRone  7.5 mg Oral TID     PRN Meds: sodium chloride flush, LORazepam **OR** LORazepam **OR** LORazepam **OR** LORazepam **OR** LORazepam **OR** LORazepam **OR** LORazepam **OR** LORazepam, glucose, dextrose bolus **OR** dextrose bolus, glucagon (rDNA), dextrose, sodium chloride flush, sodium chloride, ondansetron **OR** ondansetron, polyethylene glycol, acetaminophen **OR** acetaminophen, hydrOXYzine HCl      Intake/Output Summary (Last 24 hours) at 5/12/2023 1127  Last data filed at 5/12/2023 0935  Gross per 24 hour   Intake 361 ml   Output --   Net 361 ml       Exam:    /83   Pulse 76   Temp 98.4 °F (36.9 °C)   Resp 20   Ht 5' 11\" (1.803 m)   Wt 214 lb 3.2 oz (97.2 kg)   SpO2 97%   BMI 29.87 kg/m²     General appearance: alert, cooperative  Lungs: clear to auscultation bilaterally  Heart: S1/S2,RRR  Abdomen: soft, active BS  Extremities: no edema      Labs:   Recent Labs     05/10/23  2130   WBC 8.0   HGB 14.7   HCT 41.7*        Recent Labs     05/10/23  2130      K 3.6      CO2 22   BUN 15   CREATININE 0.95   CALCIUM 9.9     Recent Labs     05/10/23  2130   AST 35   ALT 70*   BILITOT 1.0*   ALKPHOS 155*     No results for input(s): INR in the last 72 hours.   Recent Labs     05/10/23  2130

## 2023-05-12 NOTE — PLAN OF CARE
Nutrition Problem #1: Inadequate oral intake  Intervention: Food and/or Nutrient Delivery: Continue Current Diet, Continue Oral Nutrition Supplement

## 2023-05-12 NOTE — PROGRESS NOTES
Comprehensive Nutrition Assessment    Type and Reason for Visit:  Initial, Positive Nutrition Screen    Nutrition Recommendations/Plan:   Continue Current Diet, Continue Oral Nutrition Supplement     Malnutrition Assessment:  Malnutrition Status:  No malnutrition (05/12/23 1358)      Nutrition Assessment:    Pt presents with reported decreased appetite/intake x 'a few weeks', with noted weight loss. To provide diabetic supplement tid with meals. To continue to follow. Nutrition Related Findings:    PMH-DB, etoh abuse, anxiety; admitted with acute anxiety. Labs/meds reviewed. Pt receiving MVI, thiamine daily. Gluc-153-321 x last 2 days. Wound Type: None       Current Nutrition Intake & Therapies:    Average Meal Intake: % (lunch today; <25% at breakfast)     ADULT DIET; Regular; 5 carb choices (75 gm/meal)  ADULT ORAL NUTRITION SUPPLEMENT; Breakfast, Lunch, Dinner; Diabetic Oral Supplement    Anthropometric Measures:  Height: 5' 11\" (180.3 cm)  Ideal Body Weight (IBW): 172 lbs (78 kg)    Admission Body Weight: 214 lb (97.1 kg) ([facility])  Current BMI (kg/m2):  29.9  Usual Body Weight: 235 lb (106.6 kg) (stated 2/2023-4/2023; 227lb (stated 10/12/2022))                       BMI Categories: Overweight (BMI 25.0-29. 9)    Estimated Daily Nutrient Needs:  Energy Requirements Based On: Kcal/kg  Weight Used for Energy Requirements: Admission  Energy (kcal/day): 0907-3523 (kg x20-22)  Weight Used for Protein Requirements: Ideal  Protein (g/day):  (kg x 1.2-1.3)  Method Used for Fluid Requirements: ml/Kg  Fluid (ml/day): ~2350 (kg IBW x 30)    Nutrition Diagnosis:   Inadequate oral intake related to psychological cause or life stress as evidenced by poor intake prior to admission, weight loss  Altered nutrition-related lab values related to endocrine dysfuntion as evidenced by lab values    Nutrition Interventions:   Food and/or Nutrient Delivery: Continue Current Diet, Continue Oral Nutrition

## 2023-05-12 NOTE — CONSULTS
[address] [place] [address] Department of Psychiatry  Behavioral Health Consult    REASON FOR CONSULT: Anxiety    CONSULTING PHYSICIAN: Dr Gypsy Perez    History obtained from: Pateint    HISTORY OF PRESENT ILLNESS:      The patient is a 46 y.o. male with significant past psychiatric history of AXEL, depression who presents with increased nervousness and anxiety symptoms. Patient lives with his daughter. Patient reported that he has been noticing increased depression and anxiety symptoms over the past few days. He worry about all day about his life in general.  Patient main stressors that are financial and work-related stress. Patient denies feeling depressed. He denies any hopeless or worthless feeling. Patient has been taking BuSpar from the [facility] without any relief. Patient reported that he used to drink alcohol in the past and over the last 6 months he has not been using any alcohol or drugs. Patient denies any audiovisual hallucinations or paranoid thoughts he denies any suicidal thoughts or homicidal thoughts patient does not have any access to guns or any weapons. The patient is currently receiving care for the above psychiatric illness. Psychiatric Review of Systems       Depression: yes 5/10     Teressa or Hypomania:  no     Panic Attacks:  yes -anxious     Phobias:  no     Obsessions and Compulsions:  no     PTSD : no     Hallucinations:  no     Delusions:  no      Substance Abuse History:  ETOH: not currently using  Marijuana: no  Opiates: no  Other Drugs: no      Past Psychiatric History:  Prior Diagnosis: AXEL  Psychiatrist: damian  Therapist:no  Hospitalization: no  Hx of Suicidal Attempts: no  Hx of violence:  no  ECT: no    Past Medical History:        Diagnosis Date    Depression     Diabetes mellitus ([address]     ETOH abuse     Sleep apnea        Past Surgical History:    History reviewed. No pertinent surgical history.     Medications Prior to Admission:   Medications Prior to Admission:

## 2023-05-12 NOTE — CARE COORDINATION
PATIENT PLANS TO D/C HOME W/ FAMILY (KIDS/SISTER HELPS). HE IS UP INDEPENDENTLY. AWAITING PSYCH EVAL FOR INPUT FOR POSSIBLE ADMISSION. PATIENT ON CIWA AND PER NURSING IS HAVING VISIBILE TREMORS. CM TO FOLLOW FOR ANY NEW D/C NEEDS OR CHANGES TO THE D/C PLAN. PER PATIENT HE IS NOT CURRENTLY DRINKING BUT DOES FOLLOW WITH ROSLYN FOR ANXIETY.

## 2023-05-13 VITALS
BODY MASS INDEX: 29.99 KG/M2 | WEIGHT: 214.2 LBS | RESPIRATION RATE: 18 BRPM | DIASTOLIC BLOOD PRESSURE: 92 MMHG | HEIGHT: 71 IN | TEMPERATURE: 98.1 F | OXYGEN SATURATION: 94 % | SYSTOLIC BLOOD PRESSURE: 157 MMHG | HEART RATE: 73 BPM

## 2023-05-13 LAB
GLUCOSE BLD-MCNC: 147 MG/DL (ref 70–99)
GLUCOSE BLD-MCNC: 166 MG/DL (ref 70–99)
PERFORMED ON: ABNORMAL
PERFORMED ON: ABNORMAL

## 2023-05-13 PROCEDURE — 6370000000 HC RX 637 (ALT 250 FOR IP): Performed by: PSYCHIATRY & NEUROLOGY

## 2023-05-13 PROCEDURE — 2580000003 HC RX 258: Performed by: INTERNAL MEDICINE

## 2023-05-13 PROCEDURE — 6360000002 HC RX W HCPCS: Performed by: INTERNAL MEDICINE

## 2023-05-13 PROCEDURE — 6370000000 HC RX 637 (ALT 250 FOR IP): Performed by: STUDENT IN AN ORGANIZED HEALTH CARE EDUCATION/TRAINING PROGRAM

## 2023-05-13 RX ORDER — GABAPENTIN 100 MG/1
100 CAPSULE ORAL 3 TIMES DAILY
Qty: 42 CAPSULE | Refills: 0 | Status: SHIPPED | OUTPATIENT
Start: 2023-05-13 | End: 2023-05-27

## 2023-05-13 RX ORDER — PAROXETINE 10 MG/1
10 TABLET, FILM COATED ORAL DAILY
Qty: 15 TABLET | Refills: 3 | Status: SHIPPED | OUTPATIENT
Start: 2023-05-14 | End: 2023-05-13 | Stop reason: SDUPTHER

## 2023-05-13 RX ORDER — PAROXETINE 10 MG/1
10 TABLET, FILM COATED ORAL DAILY
Qty: 15 TABLET | Refills: 3 | Status: SHIPPED | OUTPATIENT
Start: 2023-05-14

## 2023-05-13 RX ORDER — GABAPENTIN 100 MG/1
100 CAPSULE ORAL 3 TIMES DAILY
Qty: 42 CAPSULE | Refills: 0 | Status: SHIPPED | OUTPATIENT
Start: 2023-05-13 | End: 2023-05-13 | Stop reason: SDUPTHER

## 2023-05-13 RX ORDER — BLOOD SUGAR DIAGNOSTIC
1 STRIP MISCELLANEOUS DAILY
Qty: 100 EACH | Refills: 3 | Status: SHIPPED | OUTPATIENT
Start: 2023-05-13

## 2023-05-13 RX ORDER — BUSPIRONE HYDROCHLORIDE 15 MG/1
15 TABLET ORAL 3 TIMES DAILY
Qty: 45 TABLET | Refills: 2 | Status: SHIPPED | OUTPATIENT
Start: 2023-05-13 | End: 2023-05-13 | Stop reason: SDUPTHER

## 2023-05-13 RX ORDER — BUSPIRONE HYDROCHLORIDE 15 MG/1
15 TABLET ORAL 3 TIMES DAILY
Qty: 45 TABLET | Refills: 2 | Status: SHIPPED | OUTPATIENT
Start: 2023-05-13

## 2023-05-13 RX ADMIN — Medication 100 MG: at 09:06

## 2023-05-13 RX ADMIN — PAROXETINE 10 MG: 10 TABLET, FILM COATED ORAL at 09:06

## 2023-05-13 RX ADMIN — ENOXAPARIN SODIUM 40 MG: 100 INJECTION SUBCUTANEOUS at 09:06

## 2023-05-13 RX ADMIN — BUSPIRONE HYDROCHLORIDE 15 MG: 7.5 TABLET ORAL at 13:34

## 2023-05-13 RX ADMIN — GABAPENTIN 100 MG: 100 CAPSULE ORAL at 13:34

## 2023-05-13 RX ADMIN — THERA TABS 1 TABLET: TAB at 09:05

## 2023-05-13 RX ADMIN — Medication 10 ML: at 09:07

## 2023-05-13 RX ADMIN — BUSPIRONE HYDROCHLORIDE 15 MG: 7.5 TABLET ORAL at 09:06

## 2023-05-13 RX ADMIN — GABAPENTIN 100 MG: 100 CAPSULE ORAL at 09:06

## 2023-05-13 NOTE — PROGRESS NOTES
[address] FOLLOW-UP NOTE       2023     Patient was seen and examined in person, Chart reviewed   Patient's case discussed with staff/team    Chief Complaint: ***    Interim History:     ***  Appetite: ***  [] Normal/Unchanged  [] Increased  [] Decreased      Sleep:       [] Normal/Unchanged  [] Fair       [] Poor              Energy:    [] Normal/Unchanged  [] Increased  [] Decreased        SI [] Present  [] Absent    HI  []Present  [] Absent     Aggression:  [] yes  [] no    Patient is [] able  [] unable to CONTRACT FOR SAFETY     PAST MEDICAL/PSYCHIATRIC HISTORY:   Past Medical History:   Diagnosis Date    Depression     Diabetes mellitus ([facility] [address]     ETOH abuse     Sleep apnea        FAMILY/SOCIAL HISTORY:  History reviewed. No pertinent family history. Social History     Socioeconomic History    Marital status:      Spouse name: Not on file    Number of children: Not on file    Years of education: Not on file    Highest education level: Not on file   Occupational History    Not on file   Tobacco Use    Smoking status: Former     Types: Cigarettes     Quit date:      Years since quittin.3    Smokeless tobacco: Never   Vaping Use    Vaping Use: Never used   Substance and Sexual Activity    Alcohol use: Not Currently     Comment: pt quit drinking on     Drug use: No     Comment: Daily    Sexual activity: Yes     Partners: Female   Other Topics Concern    Not on file   Social History Narrative    Not on file     Social Determinants of Health     Financial Resource Strain: Not on file   Food Insecurity: Not on file   Transportation Needs: Not on file   Physical Activity: Not on file   Stress: Not on file   Social Connections: Not on file   Intimate Partner Violence: Not on file   Housing Stability: Not on file           ROS:  [x] All negative/unchanged except if checked.  Explain positive(checked items) below:  [] Constitutional  [] Eyes  []

## 2023-05-13 NOTE — DISCHARGE SUMMARY
any significant signs or symptoms. I may not have addressed all of your medical illnesses or the abnormal blood work or imaging therefore please ask your PCP No primary care provider on file. and other out patient specialists and providers  to obtain [facility] record entirely to follow up on all of the abnormal labs, imaging and findings that I have and have not addressed during your hospitalization. Discharging you from the hospital does not mean that your medical care ends here and now. You may still need additional work up, investigation, monitoring, and treatment to be handled from this point on by outside providers including your PCP, No primary care provider on file. , Specialists and other healthcare providers. Please review your list of discharge medications prior to resuming medications you might still have at home, as the medications you need to be taking, dosages or how often you must take them may have changed. For medication questions, contact your retail pharmacy and your PCP, No primary care provider on file. Kristie Barrientos I STRONGLY RECOMMEND that you follow up with No primary care provider on file. within 3 to 5 days for a post hospitalization evaluation. This specific office visit is covered by your insurance, and is not the same as your annual doctor visit/ check up. This office visit is important, as it may prevent need for repeat and/or future hospitalizations. **    Your medical team at [facility] ([facility]) appreciates the opportunity to work with you to get well!     Sincerely,  Jacki Gupta MD

## 2023-05-13 NOTE — FLOWSHEET NOTE
Registered Nurse Shift Summary  23   Patient Name: Tameka Mullins  Attending Provider: Rosalina Meza MD      Admit Date: 5/10/2023  MRN: [id number]                           : 1975  Full Code    Code Status: Full Code      0710: Assumed Care of Patient. Documentation initiated as per policy / SOP. Report Received from Se Montalvo RN.    [phone]: RN spoke with patient regarding possible discharge. Patient expressed a desire to go home today. Patient denies suicidal thoughts, denies self harm thoughts. He indicates that he still has some anxiety but it is a lot better than when he came in. He said he understands it can take a week or two for the new medications to work. 1330: Secure message to Dr. Levi Dodson: Is this patient cleared from your stand point to discharge? 1550: Patient made aware of discharge today. He indicated that he is trying to get a ride home and will let me know when one has been arranged. Electronically signed by Manuel Valencia RN on 2023 at 3:56 PM     1640: Reviewed discharge instructions and med changes with patient who verbalized understanding. All belongings with patient. All questions answered. Electronically signed by Manuel Valencia RN on 2023 at 4:47 PM     Assessment Complete, please see flow sheets. Labs, orders, plan of care and meds reviewed. Labs:   Recent Labs     05/10/23  2130   WBC 8.0   HGB 14.7   HCT 41.7*        Recent Labs     05/10/23  2130      K 3.6      CO2 22   BUN 15   CREATININE 0.95   CALCIUM 9.9     Recent Labs     05/10/23  2130   AST 35   ALT 70*   BILITOT 1.0*   ALKPHOS 155*     No results for input(s): INR in the last 72 hours.   Recent Labs     05/10/23  2130   CKTOTAL 209*   TROPONINI <0.010         Last set of vitals:  Enc Vitals  BP: (!) 151/95 (23)  Pulse: 77 (23)  Respirations: 18 (23)  Temp: 97.1 °F (36.2 °C) (23)  Temp Source: Oral (23)  SpO2: 97 %

## 2023-05-13 NOTE — DISCHARGE INSTRUCTIONS
Follow up with Dr. SPARKS in the next 7 days or sooner if needed. Please return to ER or call 911 if you develop any significant signs or symptoms. I may or may not have addressed all of your symptoms, medical issues,  Illnesses, or all of the abnormal blood work or imaging therefore please ask your PCP and other specialists to obtain [facility] record entirely to follow up on all of your symptoms, illnesses, abnormal labs, imaging and findings that I have and have not addressed during your hospitalization. Discharging you from the hospital does not mean that your medical care ends here and now. You may still need to have additional work up, investigation, monitoring, surveillance, and treatment to be handled from this point on by in the out patient setting by  out patient providers including your PCP, Specialists and other healthcare providers. For medication questions, contact your retail pharmacy and your PCP. Your medical team at [facility] ([facility]) appreciates the opportunity to work with you to get well!     Angelica Vela MD

## 2023-05-13 NOTE — PROGRESS NOTES
Hospitalist Progress Note      PCP: No primary care provider on file. Date of Admission: 5/10/2023    Chief Complaint:  no acute events, afebrile, stable HD, feels better overall, anxiety is improving, still worried though about going home    Medications:  Reviewed    Infusion Medications    dextrose      sodium chloride       Scheduled Medications    busPIRone  15 mg Oral TID    PARoxetine  10 mg Oral Daily    gabapentin  100 mg Oral TID    sodium chloride flush  5-40 mL IntraVENous 2 times per day    thiamine  100 mg Oral Daily    multivitamin  1 tablet Oral Daily    sodium chloride flush  5-40 mL IntraVENous 2 times per day    enoxaparin  40 mg SubCUTAneous Daily    insulin lispro  0-4 Units SubCUTAneous TID WC    insulin lispro  0-4 Units SubCUTAneous Nightly     PRN Meds: hydrOXYzine HCl, sodium chloride flush, glucose, dextrose bolus **OR** dextrose bolus, glucagon (rDNA), dextrose, sodium chloride flush, sodium chloride, ondansetron **OR** ondansetron, polyethylene glycol, acetaminophen **OR** acetaminophen      Intake/Output Summary (Last 24 hours) at 5/13/2023 0957  Last data filed at 5/13/2023 3897  Gross per 24 hour   Intake 246 ml   Output --   Net 246 ml         Exam:    BP (!) 157/92   Pulse 76   Temp 98.6 °F (37 °C) (Oral)   Resp 18   Ht 5' 11\" (1.803 m)   Wt 214 lb 3.2 oz (97.2 kg)   SpO2 95%   BMI 29.87 kg/m²     General appearance: alert, cooperative  Lungs: clear to auscultation bilaterally  Heart: S1/S2,RRR  Abdomen: soft, active BS  Extremities: no edema      Labs:   Recent Labs     05/10/23  2130   WBC 8.0   HGB 14.7   HCT 41.7*          Recent Labs     05/10/23  2130      K 3.6      CO2 22   BUN 15   CREATININE 0.95   CALCIUM 9.9       Recent Labs     05/10/23  2130   AST 35   ALT 70*   BILITOT 1.0*   ALKPHOS 155*       No results for input(s): INR in the last 72 hours.   Recent Labs     05/10/23  2130   CKTOTAL [place] <0.010         Urinalysis:      Lab

## 2023-05-18 ENCOUNTER — HOSPITAL ENCOUNTER (EMERGENCY)
Age: 48
Discharge: HOME | End: 2023-05-18
Attending: FAMILY MEDICINE
Payer: MEDICAID

## 2023-05-18 VITALS
HEART RATE: 71 BPM | SYSTOLIC BLOOD PRESSURE: 147 MMHG | TEMPERATURE: 98.1 F | DIASTOLIC BLOOD PRESSURE: 85 MMHG | OXYGEN SATURATION: 97 % | RESPIRATION RATE: 18 BRPM

## 2023-05-18 DIAGNOSIS — F41.9 ANXIETY: Primary | ICD-10-CM

## 2023-05-18 LAB
ALBUMIN SERPL-MCNC: 4.4 G/DL (ref 3.5–4.6)
ALP SERPL-CCNC: 154 U/L (ref 35–104)
ALT SERPL-CCNC: 70 U/L (ref 0–41)
AMPHET UR QL SCN: ABNORMAL
ANION GAP SERPL CALCULATED.3IONS-SCNC: 17 MEQ/L (ref 9–15)
AST SERPL-CCNC: 28 U/L (ref 0–40)
BARBITURATES UR QL SCN: ABNORMAL
BASOPHILS # BLD: 0 K/UL (ref 0–0.2)
BASOPHILS NFR BLD: 0.6 %
BENZODIAZ UR QL SCN: ABNORMAL
BILIRUB SERPL-MCNC: 1.1 MG/DL (ref 0.2–0.7)
BUN SERPL-MCNC: 15 MG/DL (ref 6–20)
CALCIUM SERPL-MCNC: 9.5 MG/DL (ref 8.5–9.9)
CANNABINOIDS UR QL SCN: POSITIVE
CHLORIDE SERPL-SCNC: 101 MEQ/L (ref 95–107)
CO2 SERPL-SCNC: 20 MEQ/L (ref 20–31)
COCAINE UR QL SCN: ABNORMAL
CREAT SERPL-MCNC: 0.84 MG/DL (ref 0.7–1.2)
DRUG SCREEN COMMENT UR-IMP: ABNORMAL
EOSINOPHIL # BLD: 0 K/UL (ref 0–0.7)
EOSINOPHIL NFR BLD: 0.5 %
ERYTHROCYTE [DISTWIDTH] IN BLOOD BY AUTOMATED COUNT: 13.1 % (ref 11.5–14.5)
ETHANOL PERCENT: NORMAL G/DL
ETHANOLAMINE SERPL-MCNC: <10 MG/DL (ref 0–0.08)
FENTANYL SCREEN, URINE: ABNORMAL
GLOBULIN SER CALC-MCNC: 2.6 G/DL (ref 2.3–3.5)
GLUCOSE SERPL-MCNC: 178 MG/DL (ref 70–99)
HCT VFR BLD AUTO: 42.2 % (ref 42–52)
HGB BLD-MCNC: 15.1 G/DL (ref 14–18)
LYMPHOCYTES # BLD: 1.3 K/UL (ref 1–4.8)
LYMPHOCYTES NFR BLD: 17.5 %
MCH RBC QN AUTO: 30.3 PG (ref 27–31.3)
MCHC RBC AUTO-ENTMCNC: 35.7 % (ref 33–37)
MCV RBC AUTO: 85 FL (ref 79–92.2)
METHADONE UR QL SCN: ABNORMAL
MONOCYTES # BLD: 0.5 K/UL (ref 0.2–0.8)
MONOCYTES NFR BLD: 6.3 %
NEUTROPHILS # BLD: 5.7 K/UL (ref 1.4–6.5)
NEUTS SEG NFR BLD: 75.1 %
OPIATES UR QL SCN: ABNORMAL
OXYCODONE UR QL SCN: ABNORMAL
PCP UR QL SCN: ABNORMAL
PLATELET # BLD AUTO: 300 K/UL (ref 130–400)
POTASSIUM SERPL-SCNC: 3.8 MEQ/L (ref 3.4–4.9)
PROPOXYPH UR QL SCN: ABNORMAL
PROT SERPL-MCNC: 7 G/DL (ref 6.3–8)
RBC # BLD AUTO: 4.97 M/UL (ref 4.7–6.1)
SODIUM SERPL-SCNC: 138 MEQ/L (ref 135–144)
WBC # BLD AUTO: 7.5 K/UL (ref 4.8–10.8)

## 2023-05-18 PROCEDURE — 80053 COMPREHEN METABOLIC PANEL: CPT

## 2023-05-18 PROCEDURE — 82077 ASSAY SPEC XCP UR&BREATH IA: CPT

## 2023-05-18 PROCEDURE — 99284 EMERGENCY DEPT VISIT MOD MDM: CPT

## 2023-05-18 PROCEDURE — 85025 COMPLETE CBC W/AUTO DIFF WBC: CPT

## 2023-05-18 PROCEDURE — 96374 THER/PROPH/DIAG INJ IV PUSH: CPT

## 2023-05-18 PROCEDURE — 36415 COLL VENOUS BLD VENIPUNCTURE: CPT

## 2023-05-18 PROCEDURE — 2580000003 HC RX 258: Performed by: FAMILY MEDICINE

## 2023-05-18 PROCEDURE — 80307 DRUG TEST PRSMV CHEM ANLYZR: CPT

## 2023-05-18 PROCEDURE — 6360000002 HC RX W HCPCS: Performed by: FAMILY MEDICINE

## 2023-05-18 RX ORDER — LORAZEPAM 2 MG/ML
1 INJECTION INTRAMUSCULAR ONCE
Status: COMPLETED | OUTPATIENT
Start: 2023-05-18 | End: 2023-05-18

## 2023-05-18 RX ORDER — 0.9 % SODIUM CHLORIDE 0.9 %
1000 INTRAVENOUS SOLUTION INTRAVENOUS ONCE
Status: COMPLETED | OUTPATIENT
Start: 2023-05-18 | End: 2023-05-18

## 2023-05-18 RX ADMIN — LORAZEPAM 1 MG: 2 INJECTION INTRAMUSCULAR; INTRAVENOUS at 20:59

## 2023-05-18 RX ADMIN — SODIUM CHLORIDE 1000 ML: 9 INJECTION, SOLUTION INTRAVENOUS at 17:47

## 2023-05-18 NOTE — ED TRIAGE NOTES
[facility] with c/o withdrawal pt thinks (from benadryl)  Pt states last time he was in the hospital he was put on paxtel and gabapentin  Pt states he was taking benadryl every 8 hours from Monday to Wednesday (pt states once he stopped it he didn't feel right)  Pt was upset when asked when the last time he drank (he states it wasn't that)  Pt denies pain   Pt is pacing all over the room (can't sit still)  Pt is afebrile   Pt is A&Ox 4  Pt has a steady gait   Pt has beads of sweat on his forehead   Pt has tremors  Pt states he used to be an alcoholic

## 2023-05-19 NOTE — ED PROVIDER NOTES
[address] [facility] ED  eMERGENCY dEPARTMENT eNCOUnter      Pt Name: Humberto Bobo  MRN: [id number]  [place] 1975  Date of evaluation: 5/18/2023  Provider: Himanshu Cummings MD    CHIEF COMPLAINT       Chief Complaint   Patient presents with    Anxiety     Pt states he thinks he is having withdrawals from benadryl (started taking it Monday and stopped taking it on Wednesday) since then pt states he hasn't felt right) Pt states he was recently put on paxtel and gabapentin. HISTORY OF PRESENT ILLNESS   (Location/Symptom, Timing/Onset,Context/Setting, Quality, Duration, Modifying Factors, Severity)  Note limiting factors. Humberto Bobo is a 46 y.o. male who presents to the emergency department and anxiety    46years old with known history of severe anxiety who is admitted last week  for anxiety who was discharged home on Paxil BuSpar and gabapentin. Presented today states that he accidentally been taking Benadryl every 8 hours for the last few days stopped today and feeling more anxious agitated and very concerned. The history is provided by the patient. NursingNotes were reviewed. REVIEW OF SYSTEMS    (2-9 systems for level 4, 10 or more for level 5)     Review of Systems   Constitutional: Negative. HENT: Negative. Respiratory: Negative. Cardiovascular: Negative. Skin: Negative. Psychiatric/Behavioral: Negative. Except as noted above the remainder of the review of systems was reviewed and negative. PAST MEDICAL HISTORY     Past Medical History:   Diagnosis Date    Depression     Diabetes mellitus ([facility] [address]     ETOH abuse     Sleep apnea          SURGICALHISTORY     History reviewed. No pertinent surgical history.       CURRENT MEDICATIONS       Discharge Medication List as of 5/18/2023  9:02 PM        CONTINUE these medications which have NOT CHANGED    Details   busPIRone (BUSPAR) 15 MG tablet Take 15 mg by mouth 3 times daily, Disp-45 tablet, R-2Print

## 2023-05-20 ASSESSMENT — ENCOUNTER SYMPTOMS: RESPIRATORY NEGATIVE: 1

## 2023-05-23 LAB
GLUCOSE BLD-MCNC: 145 MG/DL (ref 74–99)
GLUCOSE BLD-MCNC: 158 MG/DL (ref 74–99)
GLUCOSE BLD-MCNC: 218 MG/DL (ref 74–99)

## 2023-05-27 LAB
GLUCOSE BLD-MCNC: 102 MG/DL (ref 74–99)
GLUCOSE BLD-MCNC: 137 MG/DL (ref 74–99)

## 2023-05-28 LAB
GLUCOSE BLD-MCNC: 112 MG/DL (ref 74–99)
GLUCOSE BLD-MCNC: 120 MG/DL (ref 74–99)
GLUCOSE BLD-MCNC: 124 MG/DL (ref 74–99)

## 2023-05-29 LAB
GLUCOSE BLD-MCNC: 106 MG/DL (ref 74–99)
GLUCOSE BLD-MCNC: 123 MG/DL (ref 74–99)
GLUCOSE BLD-MCNC: 127 MG/DL (ref 74–99)
GLUCOSE BLD-MCNC: 93 MG/DL (ref 74–99)

## 2023-05-30 LAB — GLUCOSE BLD-MCNC: 107 MG/DL (ref 74–99)

## 2023-06-17 ENCOUNTER — HOSPITAL ENCOUNTER (EMERGENCY)
Age: 48
Discharge: ANOTHER HOSPITAL | End: 2023-06-18
Attending: EMERGENCY MEDICINE
Payer: MEDICAID

## 2023-06-17 DIAGNOSIS — E87.20 LACTIC ACIDOSIS: ICD-10-CM

## 2023-06-17 DIAGNOSIS — F10.939 ALCOHOL WITHDRAWAL SYNDROME WITH COMPLICATION (HCC): ICD-10-CM

## 2023-06-17 DIAGNOSIS — E08.10 DIABETIC KETOACIDOSIS WITHOUT COMA ASSOCIATED WITH DIABETES MELLITUS DUE TO UNDERLYING CONDITION (HCC): Primary | ICD-10-CM

## 2023-06-17 PROCEDURE — 96365 THER/PROPH/DIAG IV INF INIT: CPT

## 2023-06-17 PROCEDURE — 99285 EMERGENCY DEPT VISIT HI MDM: CPT

## 2023-06-17 PROCEDURE — 96376 TX/PRO/DX INJ SAME DRUG ADON: CPT

## 2023-06-17 PROCEDURE — 96375 TX/PRO/DX INJ NEW DRUG ADDON: CPT

## 2023-06-17 PROCEDURE — 96361 HYDRATE IV INFUSION ADD-ON: CPT

## 2023-06-17 PROCEDURE — 2580000003 HC RX 258: Performed by: EMERGENCY MEDICINE

## 2023-06-17 RX ORDER — 0.9 % SODIUM CHLORIDE 0.9 %
1000 INTRAVENOUS SOLUTION INTRAVENOUS ONCE
Status: COMPLETED | OUTPATIENT
Start: 2023-06-17 | End: 2023-06-18

## 2023-06-17 RX ADMIN — SODIUM CHLORIDE 1000 ML: 9 INJECTION, SOLUTION INTRAVENOUS at 23:45

## 2023-06-17 ASSESSMENT — ENCOUNTER SYMPTOMS
SORE THROAT: 0
RHINORRHEA: 0
DIARRHEA: 0
ABDOMINAL DISTENTION: 0
COLOR CHANGE: 0
ABDOMINAL PAIN: 0
WHEEZING: 0
NAUSEA: 0
APNEA: 0
VOICE CHANGE: 0
CONSTIPATION: 0
PHOTOPHOBIA: 0
BACK PAIN: 0
VOMITING: 0
COUGH: 0
EYE PAIN: 0
SHORTNESS OF BREATH: 0
SINUS PRESSURE: 0

## 2023-06-18 ENCOUNTER — HOSPITAL ENCOUNTER (INPATIENT)
Age: 48
LOS: 3 days | Discharge: HOME HEALTH | End: 2023-06-21
Attending: INTERNAL MEDICINE | Admitting: INTERNAL MEDICINE
Payer: MEDICAID

## 2023-06-18 ENCOUNTER — APPOINTMENT (OUTPATIENT)
Dept: GENERAL RADIOLOGY | Age: 48
End: 2023-06-18
Payer: MEDICAID

## 2023-06-18 VITALS
HEIGHT: 71 IN | OXYGEN SATURATION: 94 % | HEART RATE: 103 BPM | DIASTOLIC BLOOD PRESSURE: 99 MMHG | SYSTOLIC BLOOD PRESSURE: 137 MMHG | RESPIRATION RATE: 23 BRPM | TEMPERATURE: 96.5 F | BODY MASS INDEX: 29.87 KG/M2

## 2023-06-18 DIAGNOSIS — F10.932 ALCOHOL WITHDRAWAL HALLUCINOSIS (HCC): Primary | ICD-10-CM

## 2023-06-18 PROBLEM — E87.20 LACTIC ACIDOSIS: Status: ACTIVE | Noted: 2023-06-18

## 2023-06-18 LAB
ALBUMIN SERPL-MCNC: 3.4 G/DL (ref 3.5–4.6)
ALBUMIN SERPL-MCNC: 4.6 G/DL (ref 3.5–4.6)
ALP SERPL-CCNC: 160 U/L (ref 35–104)
ALP SERPL-CCNC: 228 U/L (ref 35–104)
ALT SERPL-CCNC: 30 U/L (ref 0–41)
ALT SERPL-CCNC: 44 U/L (ref 0–41)
AMMONIA PLAS-SCNC: 36 UMOL/L (ref 16–60)
AMPHETAMINES UR QL SCN>500 NG/ML: ABNORMAL
AMYLASE SERPL-CCNC: 22 U/L (ref 22–93)
ANION GAP SERPL CALCULATED.3IONS-SCNC: 12 MEQ/L (ref 9–15)
ANION GAP SERPL CALCULATED.3IONS-SCNC: 16 MEQ/L (ref 9–15)
ANION GAP SERPL CALCULATED.3IONS-SCNC: 22 MEQ/L (ref 9–15)
AST SERPL-CCNC: 20 U/L (ref 0–40)
AST SERPL-CCNC: 23 U/L (ref 0–40)
B-OH-BUTYR SERPL-SCNC: 1.7 MG/DL (ref 0.2–2.8)
BACTERIA URNS QL MICRO: NEGATIVE /HPF
BARBITURATES UR QL SCN>200 NG/ML: ABNORMAL
BASE EXCESS ARTERIAL: -3
BASE EXCESS ARTERIAL: -4
BASOPHILS # BLD: 0 K/UL (ref 0–0.2)
BASOPHILS # BLD: 0.1 K/UL (ref 0–0.1)
BASOPHILS NFR BLD: 0.5 %
BASOPHILS NFR BLD: 0.6 % (ref 0.2–1.2)
BENZODIAZ UR QL SCN: ABNORMAL
BILIRUB SERPL-MCNC: 0.8 MG/DL (ref 0.2–0.7)
BILIRUB SERPL-MCNC: 1.3 MG/DL (ref 0.2–0.7)
BILIRUB UR QL STRIP: ABNORMAL
BUN SERPL-MCNC: 13 MG/DL (ref 6–20)
BUN SERPL-MCNC: 14 MG/DL (ref 6–20)
BUN SERPL-MCNC: 15 MG/DL (ref 6–20)
CALCIUM SERPL-MCNC: 7.7 MG/DL (ref 8.5–9.9)
CALCIUM SERPL-MCNC: 8.5 MG/DL (ref 8.5–9.9)
CALCIUM SERPL-MCNC: 9.2 MG/DL (ref 8.5–9.9)
CHLORIDE SERPL-SCNC: 104 MEQ/L (ref 95–107)
CHLORIDE SERPL-SCNC: 106 MEQ/L (ref 95–107)
CHLORIDE SERPL-SCNC: 97 MEQ/L (ref 95–107)
CLARITY UR: CLEAR
CO2 SERPL-SCNC: 17 MEQ/L (ref 20–31)
CO2 SERPL-SCNC: 19 MEQ/L (ref 20–31)
CO2 SERPL-SCNC: 21 MEQ/L (ref 20–31)
COCAINE UR QL SCN: ABNORMAL
COLOR UR: YELLOW
CREAT SERPL-MCNC: 0.66 MG/DL (ref 0.7–1.2)
CREAT SERPL-MCNC: 0.72 MG/DL (ref 0.7–1.2)
CREAT SERPL-MCNC: 0.82 MG/DL (ref 0.7–1.2)
DRUG SCREEN COMMENT UR-IMP: ABNORMAL
EOSINOPHIL # BLD: 0 K/UL (ref 0–0.5)
EOSINOPHIL # BLD: 0 K/UL (ref 0–0.7)
EOSINOPHIL NFR BLD: 0.1 % (ref 0.8–7)
EOSINOPHIL NFR BLD: 0.2 %
EPI CELLS #/AREA URNS HPF: ABNORMAL /HPF
ERYTHROCYTE [DISTWIDTH] IN BLOOD BY AUTOMATED COUNT: 13 % (ref 11.6–14.4)
ERYTHROCYTE [DISTWIDTH] IN BLOOD BY AUTOMATED COUNT: 13.4 % (ref 11.5–14.5)
ETHANOL PERCENT: NORMAL G/DL
ETHANOLAMINE SERPL-MCNC: <10 MG/DL (ref 0–0.08)
GLOBULIN SER CALC-MCNC: 2.3 G/DL (ref 2.3–3.5)
GLOBULIN SER CALC-MCNC: 3 G/DL (ref 2.3–3.5)
GLUCOSE BLD-MCNC: 126 MG/DL (ref 70–99)
GLUCOSE BLD-MCNC: 162 MG/DL (ref 70–99)
GLUCOSE BLD-MCNC: 169 MG/DL (ref 70–99)
GLUCOSE BLD-MCNC: 174 MG/DL (ref 70–99)
GLUCOSE BLD-MCNC: 253 MG/DL (ref 70–99)
GLUCOSE SERPL-MCNC: 143 MG/DL (ref 70–99)
GLUCOSE SERPL-MCNC: 152 MG/DL (ref 70–99)
GLUCOSE SERPL-MCNC: 277 MG/DL (ref 70–99)
GLUCOSE UR STRIP-MCNC: >=1000 MG/DL
HBA1C MFR BLD: 7.6 % (ref 4.8–5.9)
HCO3 ARTERIAL: 19.4 MMOL/L (ref 21–29)
HCO3 ARTERIAL: 20.4 MMOL/L (ref 21–29)
HCT VFR BLD AUTO: 34.9 % (ref 42–52)
HCT VFR BLD AUTO: 41.4 % (ref 42–52)
HGB BLD-MCNC: 12 G/DL (ref 14–18)
HGB BLD-MCNC: 14.6 G/DL (ref 13.7–17.5)
HGB UR QL STRIP: ABNORMAL
IMM GRANULOCYTES # BLD: 0.1 K/UL
IMM GRANULOCYTES NFR BLD: 0.7 %
KETONES UR STRIP-MCNC: 80 MG/DL
LACTATE BLDV-SCNC: 1.1 MMOL/L (ref 0.5–2.2)
LACTATE BLDV-SCNC: 5 MMOL/L (ref 0.5–2.2)
LACTATE BLDV-SCNC: 6.3 MMOL/L (ref 0.5–2.2)
LACTATE: 3.55 MMOL/L (ref 0.4–2)
LACTATE: 3.81 MMOL/L (ref 0.4–2)
LEUKOCYTE ESTERASE UR QL STRIP: NEGATIVE
LIPASE SERPL-CCNC: 18 U/L (ref 12–95)
LIPASE SERPL-CCNC: 20 U/L (ref 12–95)
LYMPHOCYTES # BLD: 0.6 K/UL (ref 1.3–3.6)
LYMPHOCYTES # BLD: 1.3 K/UL (ref 1–4.8)
LYMPHOCYTES NFR BLD: 18.8 %
LYMPHOCYTES NFR BLD: 4.8 %
MAGNESIUM SERPL-MCNC: 1.7 MG/DL (ref 1.7–2.4)
MAGNESIUM SERPL-MCNC: 1.9 MG/DL (ref 1.7–2.4)
MCH RBC QN AUTO: 29.8 PG (ref 27–31.3)
MCH RBC QN AUTO: 30.2 PG (ref 25.7–32.2)
MCHC RBC AUTO-ENTMCNC: 34.5 % (ref 33–37)
MCHC RBC AUTO-ENTMCNC: 35.3 % (ref 32.3–36.5)
MCV RBC AUTO: 85.7 FL (ref 79–92.2)
MCV RBC AUTO: 86.3 FL (ref 79–92.2)
MONOCYTES # BLD: 0.5 K/UL (ref 0.2–0.8)
MONOCYTES # BLD: 0.8 K/UL (ref 0.3–0.8)
MONOCYTES NFR BLD: 6.3 % (ref 5.3–12.2)
MONOCYTES NFR BLD: 7.5 %
NEUTROPHILS # BLD: 11.2 K/UL (ref 1.8–5.4)
NEUTROPHILS # BLD: 5.2 K/UL (ref 1.4–6.5)
NEUTS SEG NFR BLD: 73 %
NEUTS SEG NFR BLD: 87.5 % (ref 34–67.9)
NITRITE UR QL STRIP: NEGATIVE
O2 SAT, ARTERIAL: 96 % (ref 93–100)
O2 SAT, ARTERIAL: 97 % (ref 93–100)
OPIATES UR QL SCN: ABNORMAL
PCO2 ARTERIAL: 25 MM HG (ref 35–45)
PCO2 ARTERIAL: 25 MM HG (ref 35–45)
PCP UR QL SCN>25 NG/ML: ABNORMAL
PERFORMED ON: ABNORMAL
PH ARTERIAL: 7.5 (ref 7.35–7.45)
PH ARTERIAL: 7.51 (ref 7.35–7.45)
PH UR STRIP: 5 [PH] (ref 5–9)
PHOSPHATE SERPL-MCNC: 1.6 MG/DL (ref 2.3–4.8)
PLATELET # BLD AUTO: 271 K/UL (ref 130–400)
PLATELET # BLD AUTO: 372 K/UL (ref 163–337)
PO2 ARTERIAL: 69 MM HG (ref 75–108)
PO2 ARTERIAL: 78 MM HG (ref 75–108)
POC SAMPLE TYPE: ABNORMAL
POC SAMPLE TYPE: ABNORMAL
POTASSIUM SERPL-SCNC: 3.3 MEQ/L (ref 3.4–4.9)
POTASSIUM SERPL-SCNC: 4 MEQ/L (ref 3.4–4.9)
POTASSIUM SERPL-SCNC: 4.1 MEQ/L (ref 3.4–4.9)
PROT SERPL-MCNC: 5.7 G/DL (ref 6.3–8)
PROT SERPL-MCNC: 7.6 G/DL (ref 6.3–8)
PROT UR STRIP-MCNC: >=300 MG/DL
RBC # BLD AUTO: 4.04 M/UL (ref 4.7–6.1)
RBC # BLD AUTO: 4.83 M/UL (ref 4.63–6.08)
RBC #/AREA URNS HPF: ABNORMAL /HPF (ref 0–2)
SODIUM SERPL-SCNC: 136 MEQ/L (ref 135–144)
SODIUM SERPL-SCNC: 139 MEQ/L (ref 135–144)
SODIUM SERPL-SCNC: 139 MEQ/L (ref 135–144)
SP GR UR STRIP: >=1.03 (ref 1–1.03)
TCO2 ARTERIAL: 20 MMOL/L (ref 70–99)
TCO2 ARTERIAL: 21 MMOL/L (ref 70–99)
THC UR QL SCN>50 NG/ML: POSITIVE
TRICYCLICS UR QL SCN: ABNORMAL
URINE REFLEX TO CULTURE: ABNORMAL
UROBILINOGEN UR STRIP-ACNC: 0.2 E.U./DL
WBC # BLD AUTO: 12.8 K/UL (ref 4.2–9)
WBC # BLD AUTO: 7.2 K/UL (ref 4.8–10.8)
WBC #/AREA URNS HPF: ABNORMAL /HPF (ref 0–5)

## 2023-06-18 PROCEDURE — 80053 COMPREHEN METABOLIC PANEL: CPT

## 2023-06-18 PROCEDURE — 36415 COLL VENOUS BLD VENIPUNCTURE: CPT

## 2023-06-18 PROCEDURE — 82010 KETONE BODYS QUAN: CPT

## 2023-06-18 PROCEDURE — 81001 URINALYSIS AUTO W/SCOPE: CPT

## 2023-06-18 PROCEDURE — 6370000000 HC RX 637 (ALT 250 FOR IP): Performed by: INTERNAL MEDICINE

## 2023-06-18 PROCEDURE — 83036 HEMOGLOBIN GLYCOSYLATED A1C: CPT

## 2023-06-18 PROCEDURE — 83690 ASSAY OF LIPASE: CPT

## 2023-06-18 PROCEDURE — 83605 ASSAY OF LACTIC ACID: CPT

## 2023-06-18 PROCEDURE — 83735 ASSAY OF MAGNESIUM: CPT

## 2023-06-18 PROCEDURE — 6370000000 HC RX 637 (ALT 250 FOR IP): Performed by: EMERGENCY MEDICINE

## 2023-06-18 PROCEDURE — 80306 DRUG TEST PRSMV INSTRMNT: CPT

## 2023-06-18 PROCEDURE — 82803 BLOOD GASES ANY COMBINATION: CPT

## 2023-06-18 PROCEDURE — 36600 WITHDRAWAL OF ARTERIAL BLOOD: CPT

## 2023-06-18 PROCEDURE — 6360000002 HC RX W HCPCS: Performed by: EMERGENCY MEDICINE

## 2023-06-18 PROCEDURE — 2580000003 HC RX 258: Performed by: INTERNAL MEDICINE

## 2023-06-18 PROCEDURE — 2580000003 HC RX 258: Performed by: EMERGENCY MEDICINE

## 2023-06-18 PROCEDURE — 2500000003 HC RX 250 WO HCPCS: Performed by: INTERNAL MEDICINE

## 2023-06-18 PROCEDURE — 85025 COMPLETE CBC W/AUTO DIFF WBC: CPT

## 2023-06-18 PROCEDURE — 6360000002 HC RX W HCPCS: Performed by: INTERNAL MEDICINE

## 2023-06-18 PROCEDURE — 82948 REAGENT STRIP/BLOOD GLUCOSE: CPT

## 2023-06-18 PROCEDURE — 84100 ASSAY OF PHOSPHORUS: CPT

## 2023-06-18 PROCEDURE — 82150 ASSAY OF AMYLASE: CPT

## 2023-06-18 PROCEDURE — 71045 X-RAY EXAM CHEST 1 VIEW: CPT

## 2023-06-18 PROCEDURE — 6360000002 HC RX W HCPCS

## 2023-06-18 PROCEDURE — 1210000000 HC MED SURG R&B

## 2023-06-18 PROCEDURE — 6370000000 HC RX 637 (ALT 250 FOR IP)

## 2023-06-18 PROCEDURE — 87040 BLOOD CULTURE FOR BACTERIA: CPT

## 2023-06-18 PROCEDURE — 82077 ASSAY SPEC XCP UR&BREATH IA: CPT

## 2023-06-18 PROCEDURE — 82140 ASSAY OF AMMONIA: CPT

## 2023-06-18 PROCEDURE — 99223 1ST HOSP IP/OBS HIGH 75: CPT | Performed by: INTERNAL MEDICINE

## 2023-06-18 RX ORDER — SODIUM CHLORIDE 9 MG/ML
INJECTION, SOLUTION INTRAVENOUS PRN
Status: DISCONTINUED | OUTPATIENT
Start: 2023-06-18 | End: 2023-06-21 | Stop reason: HOSPADM

## 2023-06-18 RX ORDER — LORAZEPAM 1 MG/1
2 TABLET ORAL
Status: DISCONTINUED | OUTPATIENT
Start: 2023-06-18 | End: 2023-06-21 | Stop reason: HOSPADM

## 2023-06-18 RX ORDER — THIAMINE HYDROCHLORIDE 100 MG/ML
100 INJECTION, SOLUTION INTRAMUSCULAR; INTRAVENOUS DAILY
Status: DISCONTINUED | OUTPATIENT
Start: 2023-06-18 | End: 2023-06-21 | Stop reason: HOSPADM

## 2023-06-18 RX ORDER — SODIUM CHLORIDE 0.9 % (FLUSH) 0.9 %
5-40 SYRINGE (ML) INJECTION EVERY 12 HOURS SCHEDULED
Status: DISCONTINUED | OUTPATIENT
Start: 2023-06-18 | End: 2023-06-21 | Stop reason: HOSPADM

## 2023-06-18 RX ORDER — LORAZEPAM 2 MG/ML
1 INJECTION INTRAMUSCULAR ONCE
Status: COMPLETED | OUTPATIENT
Start: 2023-06-18 | End: 2023-06-18

## 2023-06-18 RX ORDER — MIRTAZAPINE 15 MG/1
15 TABLET, FILM COATED ORAL NIGHTLY
COMMUNITY
Start: 2023-05-30

## 2023-06-18 RX ORDER — 0.9 % SODIUM CHLORIDE 0.9 %
1000 INTRAVENOUS SOLUTION INTRAVENOUS ONCE
Status: COMPLETED | OUTPATIENT
Start: 2023-06-18 | End: 2023-06-18

## 2023-06-18 RX ORDER — INSULIN LISPRO 100 [IU]/ML
0-4 INJECTION, SOLUTION INTRAVENOUS; SUBCUTANEOUS NIGHTLY
Status: DISCONTINUED | OUTPATIENT
Start: 2023-06-18 | End: 2023-06-21 | Stop reason: HOSPADM

## 2023-06-18 RX ORDER — ONDANSETRON 4 MG/1
4 TABLET, ORALLY DISINTEGRATING ORAL EVERY 8 HOURS PRN
Status: DISCONTINUED | OUTPATIENT
Start: 2023-06-18 | End: 2023-06-21 | Stop reason: HOSPADM

## 2023-06-18 RX ORDER — INSULIN LISPRO 100 [IU]/ML
0-4 INJECTION, SOLUTION INTRAVENOUS; SUBCUTANEOUS
COMMUNITY
Start: 2023-05-11

## 2023-06-18 RX ORDER — SODIUM CHLORIDE, SODIUM LACTATE, POTASSIUM CHLORIDE, CALCIUM CHLORIDE 600; 310; 30; 20 MG/100ML; MG/100ML; MG/100ML; MG/100ML
INJECTION, SOLUTION INTRAVENOUS CONTINUOUS
Status: DISPENSED | OUTPATIENT
Start: 2023-06-18 | End: 2023-06-20

## 2023-06-18 RX ORDER — POLYETHYLENE GLYCOL 3350 17 G/17G
17 POWDER, FOR SOLUTION ORAL DAILY PRN
Status: DISCONTINUED | OUTPATIENT
Start: 2023-06-18 | End: 2023-06-21 | Stop reason: HOSPADM

## 2023-06-18 RX ORDER — ENOXAPARIN SODIUM 100 MG/ML
40 INJECTION SUBCUTANEOUS DAILY
Status: DISCONTINUED | OUTPATIENT
Start: 2023-06-18 | End: 2023-06-21 | Stop reason: HOSPADM

## 2023-06-18 RX ORDER — QUETIAPINE FUMARATE 25 MG/1
25 TABLET, FILM COATED ORAL 2 TIMES DAILY
COMMUNITY
Start: 2023-05-30

## 2023-06-18 RX ORDER — LORAZEPAM 1 MG/1
4 TABLET ORAL
Status: DISCONTINUED | OUTPATIENT
Start: 2023-06-18 | End: 2023-06-21 | Stop reason: HOSPADM

## 2023-06-18 RX ORDER — POTASSIUM CHLORIDE 20 MEQ/1
40 TABLET, EXTENDED RELEASE ORAL ONCE
Status: COMPLETED | OUTPATIENT
Start: 2023-06-18 | End: 2023-06-18

## 2023-06-18 RX ORDER — DEXTROSE MONOHYDRATE 100 MG/ML
INJECTION, SOLUTION INTRAVENOUS CONTINUOUS PRN
Status: DISCONTINUED | OUTPATIENT
Start: 2023-06-18 | End: 2023-06-21 | Stop reason: HOSPADM

## 2023-06-18 RX ORDER — ACETAMINOPHEN 650 MG/1
650 SUPPOSITORY RECTAL EVERY 6 HOURS PRN
Status: DISCONTINUED | OUTPATIENT
Start: 2023-06-18 | End: 2023-06-21 | Stop reason: HOSPADM

## 2023-06-18 RX ORDER — LORAZEPAM 2 MG/ML
2 INJECTION INTRAMUSCULAR
Status: DISCONTINUED | OUTPATIENT
Start: 2023-06-18 | End: 2023-06-21 | Stop reason: HOSPADM

## 2023-06-18 RX ORDER — ACETAMINOPHEN 325 MG/1
650 TABLET ORAL EVERY 6 HOURS PRN
Status: DISCONTINUED | OUTPATIENT
Start: 2023-06-18 | End: 2023-06-21 | Stop reason: HOSPADM

## 2023-06-18 RX ORDER — LORAZEPAM 2 MG/ML
4 INJECTION INTRAMUSCULAR
Status: DISCONTINUED | OUTPATIENT
Start: 2023-06-18 | End: 2023-06-21 | Stop reason: HOSPADM

## 2023-06-18 RX ORDER — LORAZEPAM 2 MG/ML
1 INJECTION INTRAMUSCULAR
Status: DISCONTINUED | OUTPATIENT
Start: 2023-06-18 | End: 2023-06-21 | Stop reason: HOSPADM

## 2023-06-18 RX ORDER — MAGNESIUM SULFATE IN WATER 40 MG/ML
2000 INJECTION, SOLUTION INTRAVENOUS ONCE
Status: COMPLETED | OUTPATIENT
Start: 2023-06-18 | End: 2023-06-18

## 2023-06-18 RX ORDER — INSULIN GLARGINE 100 [IU]/ML
5 INJECTION, SOLUTION SUBCUTANEOUS 2 TIMES DAILY
Status: DISCONTINUED | OUTPATIENT
Start: 2023-06-18 | End: 2023-06-21 | Stop reason: HOSPADM

## 2023-06-18 RX ORDER — BUSPIRONE HYDROCHLORIDE 7.5 MG/1
7.5 TABLET ORAL 3 TIMES DAILY
Status: ON HOLD | COMMUNITY
Start: 2023-04-21 | End: 2023-06-18

## 2023-06-18 RX ORDER — LORAZEPAM 1 MG/1
1 TABLET ORAL
Status: DISCONTINUED | OUTPATIENT
Start: 2023-06-18 | End: 2023-06-21 | Stop reason: HOSPADM

## 2023-06-18 RX ORDER — INSULIN LISPRO 100 [IU]/ML
0-8 INJECTION, SOLUTION INTRAVENOUS; SUBCUTANEOUS
Status: DISCONTINUED | OUTPATIENT
Start: 2023-06-18 | End: 2023-06-21 | Stop reason: HOSPADM

## 2023-06-18 RX ORDER — INSULIN GLARGINE 100 [IU]/ML
20 INJECTION, SOLUTION SUBCUTANEOUS
COMMUNITY
Start: 2023-05-25

## 2023-06-18 RX ORDER — HYDROXYZINE PAMOATE 50 MG/1
50 CAPSULE ORAL EVERY 8 HOURS PRN
COMMUNITY
Start: 2023-05-31

## 2023-06-18 RX ORDER — SODIUM CHLORIDE 0.9 % (FLUSH) 0.9 %
5-40 SYRINGE (ML) INJECTION PRN
Status: DISCONTINUED | OUTPATIENT
Start: 2023-06-18 | End: 2023-06-21 | Stop reason: HOSPADM

## 2023-06-18 RX ORDER — LORAZEPAM 1 MG/1
3 TABLET ORAL
Status: DISCONTINUED | OUTPATIENT
Start: 2023-06-18 | End: 2023-06-21 | Stop reason: HOSPADM

## 2023-06-18 RX ORDER — ONDANSETRON 2 MG/ML
4 INJECTION INTRAMUSCULAR; INTRAVENOUS EVERY 6 HOURS PRN
Status: DISCONTINUED | OUTPATIENT
Start: 2023-06-18 | End: 2023-06-21 | Stop reason: HOSPADM

## 2023-06-18 RX ORDER — LORAZEPAM 2 MG/ML
3 INJECTION INTRAMUSCULAR
Status: DISCONTINUED | OUTPATIENT
Start: 2023-06-18 | End: 2023-06-21 | Stop reason: HOSPADM

## 2023-06-18 RX ADMIN — POTASSIUM CHLORIDE 40 MEQ: 1500 TABLET, EXTENDED RELEASE ORAL at 10:37

## 2023-06-18 RX ADMIN — FOLIC ACID 1 MG: 5 INJECTION, SOLUTION INTRAMUSCULAR; INTRAVENOUS; SUBCUTANEOUS at 08:21

## 2023-06-18 RX ADMIN — SODIUM CHLORIDE 3000 MG: 900 INJECTION INTRAVENOUS at 13:22

## 2023-06-18 RX ADMIN — THIAMINE HYDROCHLORIDE 100 MG: 100 INJECTION, SOLUTION INTRAMUSCULAR; INTRAVENOUS at 08:30

## 2023-06-18 RX ADMIN — LORAZEPAM 4 MG: 2 INJECTION INTRAMUSCULAR; INTRAVENOUS at 06:51

## 2023-06-18 RX ADMIN — SODIUM CHLORIDE 3000 MG: 900 INJECTION INTRAVENOUS at 08:30

## 2023-06-18 RX ADMIN — LORAZEPAM 1 MG: 2 INJECTION INTRAMUSCULAR; INTRAVENOUS at 03:51

## 2023-06-18 RX ADMIN — INSULIN HUMAN 12 UNITS: 100 INJECTION, SOLUTION PARENTERAL at 01:46

## 2023-06-18 RX ADMIN — SODIUM CHLORIDE, POTASSIUM CHLORIDE, SODIUM LACTATE AND CALCIUM CHLORIDE: 600; 310; 30; 20 INJECTION, SOLUTION INTRAVENOUS at 16:28

## 2023-06-18 RX ADMIN — CEFTRIAXONE 1000 MG: 1 INJECTION, POWDER, FOR SOLUTION INTRAMUSCULAR; INTRAVENOUS at 02:59

## 2023-06-18 RX ADMIN — Medication 10 ML: at 08:38

## 2023-06-18 RX ADMIN — SODIUM CHLORIDE 1000 ML: 9 INJECTION, SOLUTION INTRAVENOUS at 03:12

## 2023-06-18 RX ADMIN — SODIUM CHLORIDE, POTASSIUM CHLORIDE, SODIUM LACTATE AND CALCIUM CHLORIDE: 600; 310; 30; 20 INJECTION, SOLUTION INTRAVENOUS at 06:56

## 2023-06-18 RX ADMIN — ONDANSETRON 4 MG: 2 INJECTION INTRAMUSCULAR; INTRAVENOUS at 20:33

## 2023-06-18 RX ADMIN — INSULIN GLARGINE 5 UNITS: 100 INJECTION, SOLUTION SUBCUTANEOUS at 08:32

## 2023-06-18 RX ADMIN — Medication 10 ML: at 21:01

## 2023-06-18 RX ADMIN — MAGNESIUM SULFATE HEPTAHYDRATE 2000 MG: 40 INJECTION, SOLUTION INTRAVENOUS at 10:41

## 2023-06-18 RX ADMIN — SODIUM CHLORIDE 1000 ML: 9 INJECTION, SOLUTION INTRAVENOUS at 01:47

## 2023-06-18 RX ADMIN — INSULIN GLARGINE 5 UNITS: 100 INJECTION, SOLUTION SUBCUTANEOUS at 20:50

## 2023-06-18 RX ADMIN — LORAZEPAM 1 MG: 2 INJECTION INTRAMUSCULAR; INTRAVENOUS at 00:27

## 2023-06-18 RX ADMIN — LORAZEPAM 1 MG: 1 TABLET ORAL at 19:41

## 2023-06-18 RX ADMIN — ENOXAPARIN SODIUM 40 MG: 100 INJECTION SUBCUTANEOUS at 08:30

## 2023-06-18 RX ADMIN — SODIUM CHLORIDE 3000 MG: 900 INJECTION INTRAVENOUS at 18:18

## 2023-06-18 ASSESSMENT — PAIN SCALES - GENERAL: PAINLEVEL_OUTOF10: 0

## 2023-06-19 LAB
ALBUMIN SERPL-MCNC: 3.1 G/DL (ref 3.5–4.6)
ALP SERPL-CCNC: 151 U/L (ref 35–104)
ALT SERPL-CCNC: 30 U/L (ref 0–41)
ANION GAP SERPL CALCULATED.3IONS-SCNC: 10 MEQ/L (ref 9–15)
AST SERPL-CCNC: 30 U/L (ref 0–40)
BASOPHILS # BLD: 0 K/UL (ref 0–0.2)
BASOPHILS NFR BLD: 0.9 %
BILIRUB SERPL-MCNC: 0.7 MG/DL (ref 0.2–0.7)
BUN SERPL-MCNC: 11 MG/DL (ref 6–20)
CALCIUM SERPL-MCNC: 7.8 MG/DL (ref 8.5–9.9)
CHLORIDE SERPL-SCNC: 108 MEQ/L (ref 95–107)
CO2 SERPL-SCNC: 22 MEQ/L (ref 20–31)
CREAT SERPL-MCNC: 0.72 MG/DL (ref 0.7–1.2)
EOSINOPHIL # BLD: 0.1 K/UL (ref 0–0.7)
EOSINOPHIL NFR BLD: 1.8 %
ERYTHROCYTE [DISTWIDTH] IN BLOOD BY AUTOMATED COUNT: 12.9 % (ref 11.5–14.5)
GLOBULIN SER CALC-MCNC: 2.3 G/DL (ref 2.3–3.5)
GLUCOSE BLD-MCNC: 109 MG/DL (ref 70–99)
GLUCOSE BLD-MCNC: 112 MG/DL (ref 70–99)
GLUCOSE BLD-MCNC: 137 MG/DL (ref 70–99)
GLUCOSE BLD-MCNC: 149 MG/DL (ref 70–99)
GLUCOSE SERPL-MCNC: 107 MG/DL (ref 70–99)
HCT VFR BLD AUTO: 34 % (ref 42–52)
HGB BLD-MCNC: 11.9 G/DL (ref 14–18)
LACTATE BLDV-SCNC: 0.9 MMOL/L (ref 0.5–2.2)
LACTATE BLDV-SCNC: 1 MMOL/L (ref 0.5–2.2)
LYMPHOCYTES # BLD: 1.5 K/UL (ref 1–4.8)
LYMPHOCYTES NFR BLD: 28.2 %
MAGNESIUM SERPL-MCNC: 1.9 MG/DL (ref 1.7–2.4)
MCH RBC QN AUTO: 30.4 PG (ref 27–31.3)
MCHC RBC AUTO-ENTMCNC: 35 % (ref 33–37)
MCV RBC AUTO: 86.9 FL (ref 79–92.2)
MONOCYTES # BLD: 0.3 K/UL (ref 0.2–0.8)
MONOCYTES NFR BLD: 5.5 %
NEUTROPHILS # BLD: 3.4 K/UL (ref 1.4–6.5)
NEUTS SEG NFR BLD: 63.6 %
PERFORMED ON: ABNORMAL
PHOSPHATE SERPL-MCNC: 2.3 MG/DL (ref 2.3–4.8)
PLATELET # BLD AUTO: 240 K/UL (ref 130–400)
POTASSIUM SERPL-SCNC: 3.5 MEQ/L (ref 3.4–4.9)
PROT SERPL-MCNC: 5.4 G/DL (ref 6.3–8)
RBC # BLD AUTO: 3.91 M/UL (ref 4.7–6.1)
SODIUM SERPL-SCNC: 140 MEQ/L (ref 135–144)
WBC # BLD AUTO: 5.3 K/UL (ref 4.8–10.8)

## 2023-06-19 PROCEDURE — 1210000000 HC MED SURG R&B

## 2023-06-19 PROCEDURE — 2580000003 HC RX 258: Performed by: INTERNAL MEDICINE

## 2023-06-19 PROCEDURE — 6360000002 HC RX W HCPCS: Performed by: INTERNAL MEDICINE

## 2023-06-19 PROCEDURE — 6370000000 HC RX 637 (ALT 250 FOR IP): Performed by: INTERNAL MEDICINE

## 2023-06-19 PROCEDURE — 85025 COMPLETE CBC W/AUTO DIFF WBC: CPT

## 2023-06-19 PROCEDURE — 83735 ASSAY OF MAGNESIUM: CPT

## 2023-06-19 PROCEDURE — 83605 ASSAY OF LACTIC ACID: CPT

## 2023-06-19 PROCEDURE — 99232 SBSQ HOSP IP/OBS MODERATE 35: CPT | Performed by: INTERNAL MEDICINE

## 2023-06-19 PROCEDURE — 36415 COLL VENOUS BLD VENIPUNCTURE: CPT

## 2023-06-19 PROCEDURE — 80053 COMPREHEN METABOLIC PANEL: CPT

## 2023-06-19 PROCEDURE — 84100 ASSAY OF PHOSPHORUS: CPT

## 2023-06-19 PROCEDURE — 2500000003 HC RX 250 WO HCPCS: Performed by: INTERNAL MEDICINE

## 2023-06-19 RX ORDER — CHLORDIAZEPOXIDE HYDROCHLORIDE 25 MG/1
25 CAPSULE, GELATIN COATED ORAL EVERY 6 HOURS
Status: COMPLETED | OUTPATIENT
Start: 2023-06-19 | End: 2023-06-20

## 2023-06-19 RX ADMIN — INSULIN GLARGINE 5 UNITS: 100 INJECTION, SOLUTION SUBCUTANEOUS at 08:43

## 2023-06-19 RX ADMIN — Medication 10 ML: at 08:45

## 2023-06-19 RX ADMIN — NYSTATIN 500000 UNITS: 100000 SUSPENSION ORAL at 00:52

## 2023-06-19 RX ADMIN — INSULIN GLARGINE 5 UNITS: 100 INJECTION, SOLUTION SUBCUTANEOUS at 21:37

## 2023-06-19 RX ADMIN — SODIUM CHLORIDE 3000 MG: 900 INJECTION INTRAVENOUS at 00:14

## 2023-06-19 RX ADMIN — SODIUM CHLORIDE 3000 MG: 900 INJECTION INTRAVENOUS at 14:03

## 2023-06-19 RX ADMIN — LORAZEPAM 1 MG: 1 TABLET ORAL at 21:38

## 2023-06-19 RX ADMIN — ENOXAPARIN SODIUM 40 MG: 100 INJECTION SUBCUTANEOUS at 08:43

## 2023-06-19 RX ADMIN — NYSTATIN 500000 UNITS: 100000 SUSPENSION ORAL at 18:21

## 2023-06-19 RX ADMIN — FOLIC ACID 1 MG: 5 INJECTION, SOLUTION INTRAMUSCULAR; INTRAVENOUS; SUBCUTANEOUS at 08:40

## 2023-06-19 RX ADMIN — LORAZEPAM 1 MG: 2 INJECTION INTRAMUSCULAR; INTRAVENOUS at 06:50

## 2023-06-19 RX ADMIN — NYSTATIN 500000 UNITS: 100000 SUSPENSION ORAL at 08:46

## 2023-06-19 RX ADMIN — SODIUM CHLORIDE 3000 MG: 900 INJECTION INTRAVENOUS at 05:50

## 2023-06-19 RX ADMIN — SODIUM CHLORIDE 3000 MG: 900 INJECTION INTRAVENOUS at 21:45

## 2023-06-19 RX ADMIN — CHLORDIAZEPOXIDE HYDROCHLORIDE 25 MG: 25 CAPSULE ORAL at 18:21

## 2023-06-19 RX ADMIN — LORAZEPAM 1 MG: 1 TABLET ORAL at 18:21

## 2023-06-19 RX ADMIN — THIAMINE HYDROCHLORIDE 100 MG: 100 INJECTION, SOLUTION INTRAMUSCULAR; INTRAVENOUS at 08:42

## 2023-06-19 RX ADMIN — Medication 10 ML: at 21:30

## 2023-06-19 ASSESSMENT — ENCOUNTER SYMPTOMS
PHOTOPHOBIA: 0
DIARRHEA: 0
WHEEZING: 0
RHINORRHEA: 0
APNEA: 0
VOICE CHANGE: 0
CONSTIPATION: 0
NAUSEA: 0
ABDOMINAL PAIN: 0
SORE THROAT: 0
SINUS PRESSURE: 0
SHORTNESS OF BREATH: 0
ABDOMINAL DISTENTION: 0
EYE PAIN: 0
VOMITING: 0
COLOR CHANGE: 0
COUGH: 0
BACK PAIN: 0

## 2023-06-19 NOTE — PROGRESS NOTES
INPATIENT PROGRESS NOTES    PATIENT NAME: Dary Junior  MRN: [id number]  SERVICE DATE:  June 19, 2023   SERVICE TIME:  8:21 AM      PRIMARY SERVICE: Pulmonary Disease    CHIEF COMPLAIN: Alcohol withdrawal      INTERVAL HPI: Patient seen and examined at bedside, Interval Notes, orders reviewed. Nursing notes noted  He is transfer out of ICU. He is a daily drinker. He is diabetic but he has no DKA. Currently resting comfortably and in no symptoms of alcohol withdrawal.He denies having shortness of breath. No chest pain. OBJECTIVE    Body mass index is 31.72 kg/m². PHYSICAL EXAM:  Vitals:  /66   Pulse 78   Temp 98.6 °F (37 °C) (Oral)   Resp 16   Ht 5' 11\" (1.803 m)   Wt 227 lb 6.4 oz (103.1 kg)   SpO2 93%   BMI 31.72 kg/m²   General: Alert, awake . comfortable in bed, No distress. Head: Atraumatic , Normocephalic   Eyes: PERRL. No sclera icterus. No conjunctival injection. No discharge   ENT: No nasal  discharge. Pharynx clear. Neck:  Trachea midline. No thyromegaly, no JVD, No cervical adenopathy. Chest : Bilaterally symmetrical ,Normal effort,  No accessory muscle use  Lung : . Fair BS bilateral, decreased BS at bases. No Rales. No wheezing. No rhonchi. Heart[de-identified] Normal  rate. Regular rhythm. No mumur ,  Rub or gallop  ABD: Non-tender. Non-distended. No masses. No organmegaly. Normal bowel sounds. No hernia.   Ext : No Pitting both leg , No Cyanosis No clubbing  Neuro: no focal weakness          DATA:   Recent Labs     06/18/23  0713 06/19/23 0421   WBC 7.2 5.3   HGB 12.0* 11.9*   HCT 34.9* 34.0*   MCV 86.3 86.9    240     Recent Labs     06/18/23  0713 06/19/23 0421    140   K 3.3* 3.5    108*   CO2 21 22   BUN 13 11   CREATININE 0.66* 0.72   GLUCOSE 143* 107*   CALCIUM 7.7* 7.8*   PROT 5.7* 5.4*   LABALBU 3.4* 3.1*   BILITOT 0.8* 0.7   ALKPHOS 160* 151*   AST 20 30   ALT 30 30   LABGLOM >60.0 >60.0   GLOB 2.3 2.3     Lab Results   Component Value Date/Time    LACTA 1.0

## 2023-06-19 NOTE — PROGRESS NOTES
Hospitalist Progress Note      Date of Admission: 6/18/2023  Chief Complaint:    No chief complaint on file. Subjective:  No new complaints.   No nausea, vomiting, chest pain, or headache      Medications:    Infusion Medications    sodium chloride      dextrose      lactated ringers IV soln 100 mL/hr at 06/18/23 1748     Scheduled Medications    nystatin  5 mL Oral 4x Daily    chlordiazePOXIDE  25 mg Oral Q6H    sodium chloride flush  5-40 mL IntraVENous 2 times per day    enoxaparin  40 mg SubCUTAneous Daily    thiamine  100 mg IntraVENous Daily    folic acid IVPB  1 mg IntraVENous Daily    insulin glargine  5 Units SubCUTAneous BID    insulin lispro  0-8 Units SubCUTAneous TID WC    insulin lispro  0-4 Units SubCUTAneous Nightly    ampicillin-sulbactam  3,000 mg IntraVENous Q6H     PRN Meds: sodium chloride flush, sodium chloride, ondansetron **OR** ondansetron, polyethylene glycol, acetaminophen **OR** acetaminophen, LORazepam **OR** LORazepam **OR** LORazepam **OR** LORazepam **OR** LORazepam **OR** LORazepam **OR** LORazepam **OR** LORazepam, glucose, dextrose bolus **OR** dextrose bolus, glucagon (rDNA), dextrose    Intake/Output Summary (Last 24 hours) at 6/19/2023 1925  Last data filed at 6/19/2023 1507  Gross per 24 hour   Intake 290.87 ml   Output --   Net 290.87 ml     Exam:  /66   Pulse 78   Temp 98.6 °F (37 °C) (Oral)   Resp 16   Ht 5' 11\" (1.803 m)   Wt 227 lb 6.4 oz (103.1 kg)   SpO2 93%   BMI 31.72 kg/m²   Head: Normocephalic, atraumatic  Sclera clear  Neck JVD flat  Lungs: normal effort of breathing    Labs:   Recent Labs     06/18/23  0212 06/18/23  0713 06/19/23  0421   WBC 12.8* 7.2 5.3   HGB 14.6 12.0* 11.9*   HCT 41.4* 34.9* 34.0*   * 271 240     Recent Labs     06/18/23  0024 06/18/23  0317 06/18/23  0713 06/19/23  0421    139 139 140   K 4.1 4.0 3.3* 3.5   CL 97 104 106 108*   CO2 17* 19* 21 22   BUN 15 14 13 11   CREATININE 0.82 0.72 0.66* 0.72   CALCIUM 9.2 8.5

## 2023-06-19 NOTE — PROGRESS NOTES
Shift Summary:  1941: CIWA score at an 8, 1mg oral ativan given. 2041: Ciwa reassessment scoring at a 2, patient stated he feels better from the ativan. 2220: Phone report called to [facility], all questions answered. 2230: Patient placed on telemetry monitor. 2250: Transporter on unit transferring patient via wheelchair, with patient belongings, IV pole, and patient chart.

## 2023-06-19 NOTE — FLOWSHEET NOTE
Pt transferred from [facility] via wheelchair to room 284 2w. Pt walked to bed without incident, bed alarm placed for safety. Pt alert, oriented, calm, and responds appropriately to all questions. VSS. Admission assessment and skin check completed, documented in epic system  Pt denies any pain, tremors, or agitation at this time. home belongings accounted for, with pt at bedside.  Pt oriented to room and call system

## 2023-06-19 NOTE — PLAN OF CARE
Problem: Discharge Planning  Goal: Discharge to home or other facility with appropriate resources  Outcome: Progressing  Flowsheets  Taken 6/18/2023 2332  Discharge to home or other facility with appropriate resources: Identify barriers to discharge with patient and caregiver  Taken 6/18/2023 2322  Discharge to home or other facility with appropriate resources: Identify barriers to discharge with patient and caregiver

## 2023-06-20 LAB
ALBUMIN SERPL-MCNC: 3.4 G/DL (ref 3.5–4.6)
ALP SERPL-CCNC: 168 U/L (ref 35–104)
ALT SERPL-CCNC: 43 U/L (ref 0–41)
ANION GAP SERPL CALCULATED.3IONS-SCNC: 12 MEQ/L (ref 9–15)
AST SERPL-CCNC: 47 U/L (ref 0–40)
BASOPHILS # BLD: 0.1 K/UL (ref 0–0.2)
BASOPHILS NFR BLD: 1.2 %
BILIRUB SERPL-MCNC: 0.7 MG/DL (ref 0.2–0.7)
BUN SERPL-MCNC: 6 MG/DL (ref 6–20)
CALCIUM SERPL-MCNC: 8.3 MG/DL (ref 8.5–9.9)
CHLORIDE SERPL-SCNC: 105 MEQ/L (ref 95–107)
CO2 SERPL-SCNC: 22 MEQ/L (ref 20–31)
CREAT SERPL-MCNC: 0.79 MG/DL (ref 0.7–1.2)
EOSINOPHIL # BLD: 0.2 K/UL (ref 0–0.7)
EOSINOPHIL NFR BLD: 3.5 %
ERYTHROCYTE [DISTWIDTH] IN BLOOD BY AUTOMATED COUNT: 13 % (ref 11.5–14.5)
GLOBULIN SER CALC-MCNC: 2.4 G/DL (ref 2.3–3.5)
GLUCOSE BLD-MCNC: 107 MG/DL (ref 70–99)
GLUCOSE BLD-MCNC: 144 MG/DL (ref 70–99)
GLUCOSE BLD-MCNC: 153 MG/DL (ref 70–99)
GLUCOSE BLD-MCNC: 91 MG/DL (ref 70–99)
GLUCOSE SERPL-MCNC: 105 MG/DL (ref 70–99)
HCT VFR BLD AUTO: 35.6 % (ref 42–52)
HGB BLD-MCNC: 12.6 G/DL (ref 14–18)
LACTATE BLDV-SCNC: 0.8 MMOL/L (ref 0.5–2.2)
LYMPHOCYTES # BLD: 1.3 K/UL (ref 1–4.8)
LYMPHOCYTES NFR BLD: 26.6 %
MAGNESIUM SERPL-MCNC: 2 MG/DL (ref 1.7–2.4)
MCH RBC QN AUTO: 30.8 PG (ref 27–31.3)
MCHC RBC AUTO-ENTMCNC: 35.3 % (ref 33–37)
MCV RBC AUTO: 87.3 FL (ref 79–92.2)
MONOCYTES # BLD: 0.3 K/UL (ref 0.2–0.8)
MONOCYTES NFR BLD: 5.6 %
NEUTROPHILS # BLD: 3 K/UL (ref 1.4–6.5)
NEUTS SEG NFR BLD: 63.1 %
PERFORMED ON: ABNORMAL
PERFORMED ON: NORMAL
PHOSPHATE SERPL-MCNC: 3.5 MG/DL (ref 2.3–4.8)
PLATELET # BLD AUTO: 226 K/UL (ref 130–400)
POTASSIUM SERPL-SCNC: 3.3 MEQ/L (ref 3.4–4.9)
PROT SERPL-MCNC: 5.8 G/DL (ref 6.3–8)
RBC # BLD AUTO: 4.08 M/UL (ref 4.7–6.1)
SODIUM SERPL-SCNC: 139 MEQ/L (ref 135–144)
WBC # BLD AUTO: 4.8 K/UL (ref 4.8–10.8)

## 2023-06-20 PROCEDURE — 1210000000 HC MED SURG R&B

## 2023-06-20 PROCEDURE — 36415 COLL VENOUS BLD VENIPUNCTURE: CPT

## 2023-06-20 PROCEDURE — 2500000003 HC RX 250 WO HCPCS: Performed by: INTERNAL MEDICINE

## 2023-06-20 PROCEDURE — 83605 ASSAY OF LACTIC ACID: CPT

## 2023-06-20 PROCEDURE — 6370000000 HC RX 637 (ALT 250 FOR IP): Performed by: INTERNAL MEDICINE

## 2023-06-20 PROCEDURE — 6360000002 HC RX W HCPCS: Performed by: INTERNAL MEDICINE

## 2023-06-20 PROCEDURE — 84100 ASSAY OF PHOSPHORUS: CPT

## 2023-06-20 PROCEDURE — 85025 COMPLETE CBC W/AUTO DIFF WBC: CPT

## 2023-06-20 PROCEDURE — 2580000003 HC RX 258: Performed by: INTERNAL MEDICINE

## 2023-06-20 PROCEDURE — 83735 ASSAY OF MAGNESIUM: CPT

## 2023-06-20 PROCEDURE — 80053 COMPREHEN METABOLIC PANEL: CPT

## 2023-06-20 RX ORDER — CHLORDIAZEPOXIDE HYDROCHLORIDE 25 MG/1
25 CAPSULE, GELATIN COATED ORAL EVERY 6 HOURS
Status: DISCONTINUED | OUTPATIENT
Start: 2023-06-21 | End: 2023-06-21 | Stop reason: HOSPADM

## 2023-06-20 RX ORDER — CHLORDIAZEPOXIDE HYDROCHLORIDE 25 MG/1
50 CAPSULE, GELATIN COATED ORAL ONCE
Status: COMPLETED | OUTPATIENT
Start: 2023-06-20 | End: 2023-06-20

## 2023-06-20 RX ORDER — LORAZEPAM 2 MG/ML
2 INJECTION INTRAMUSCULAR ONCE
Status: DISCONTINUED | OUTPATIENT
Start: 2023-06-20 | End: 2023-06-21 | Stop reason: HOSPADM

## 2023-06-20 RX ORDER — LORAZEPAM 2 MG/ML
4 INJECTION INTRAMUSCULAR ONCE
Status: DISCONTINUED | OUTPATIENT
Start: 2023-06-20 | End: 2023-06-20

## 2023-06-20 RX ADMIN — Medication 5 ML: at 09:00

## 2023-06-20 RX ADMIN — SODIUM CHLORIDE 3000 MG: 900 INJECTION INTRAVENOUS at 01:26

## 2023-06-20 RX ADMIN — INSULIN GLARGINE 5 UNITS: 100 INJECTION, SOLUTION SUBCUTANEOUS at 08:49

## 2023-06-20 RX ADMIN — INSULIN GLARGINE 5 UNITS: 100 INJECTION, SOLUTION SUBCUTANEOUS at 21:17

## 2023-06-20 RX ADMIN — CHLORDIAZEPOXIDE HYDROCHLORIDE 25 MG: 25 CAPSULE ORAL at 01:17

## 2023-06-20 RX ADMIN — SODIUM CHLORIDE 3000 MG: 900 INJECTION INTRAVENOUS at 09:03

## 2023-06-20 RX ADMIN — THIAMINE HYDROCHLORIDE 100 MG: 100 INJECTION, SOLUTION INTRAMUSCULAR; INTRAVENOUS at 08:33

## 2023-06-20 RX ADMIN — Medication 10 ML: at 21:19

## 2023-06-20 RX ADMIN — ENOXAPARIN SODIUM 40 MG: 100 INJECTION SUBCUTANEOUS at 09:00

## 2023-06-20 RX ADMIN — LORAZEPAM 2 MG: 2 INJECTION INTRAMUSCULAR; INTRAVENOUS at 08:33

## 2023-06-20 RX ADMIN — FOLIC ACID 1 MG: 5 INJECTION, SOLUTION INTRAMUSCULAR; INTRAVENOUS; SUBCUTANEOUS at 09:50

## 2023-06-20 RX ADMIN — LORAZEPAM 2 MG: 1 TABLET ORAL at 08:54

## 2023-06-20 RX ADMIN — CHLORDIAZEPOXIDE HYDROCHLORIDE 25 MG: 25 CAPSULE ORAL at 06:52

## 2023-06-20 RX ADMIN — CHLORDIAZEPOXIDE HYDROCHLORIDE 25 MG: 25 CAPSULE ORAL at 18:14

## 2023-06-20 RX ADMIN — LORAZEPAM 1 MG: 1 TABLET ORAL at 18:15

## 2023-06-20 RX ADMIN — SODIUM CHLORIDE 3000 MG: 900 INJECTION INTRAVENOUS at 18:24

## 2023-06-20 RX ADMIN — CHLORDIAZEPOXIDE HYDROCHLORIDE 50 MG: 25 CAPSULE ORAL at 08:54

## 2023-06-20 RX ADMIN — SODIUM CHLORIDE, POTASSIUM CHLORIDE, SODIUM LACTATE AND CALCIUM CHLORIDE: 600; 310; 30; 20 INJECTION, SOLUTION INTRAVENOUS at 01:24

## 2023-06-20 ASSESSMENT — PAIN SCALES - GENERAL: PAINLEVEL_OUTOF10: 2

## 2023-06-20 NOTE — PROGRESS NOTES
Patient rested quietly during night. CIWA scored. Patient had Ativan prn x1 as per score. Patient also medicated with routine Librium as ordered. No seizure activity. Respirations are even and non-labored. Up to bathroom with upright and steady gait.

## 2023-06-20 NOTE — FLOWSHEET NOTE
Pt blood pressure was up high this AM also notified Dr about shakes and sweats and CWIAA score new orders received and were effective blood pressure is down and pt rested most of the day up to the bathroom and eating all meals. Pt scored a 10 on CWIAA so ativan 1mg given PO. Pt has been very pleasant and cooperative and Lets get real info given to Pt. Electronically signed by Emily Braun LPN on 1/99/3655 at 5:56 PM

## 2023-06-20 NOTE — CARE COORDINATION
Case Management Assessment  Initial Evaluation    Date/Time of Evaluation: 6/20/2023 1:48 PM  Assessment Completed by: Tashi Vale RN    If patient is discharged prior to next notation, then this note serves as note for discharge by case management. Patient Name: Julio Eisenberg                   YOB: 1975  Diagnosis: Lactic acidosis [E87.20]  Alcohol withdrawal hallucinosis ([facility] [address] [F10.932]                   Date / Time: 6/18/2023  5:30 AM    Patient Admission Status: Inpatient   Readmission Risk (Low < 19, Mod (19-27), High > 27): Readmission Risk Score: 18.9    Current PCP: [address] [place] [address],[address]  PCP verified by CM? Yes    Chart Reviewed: Yes      History Provided by: Patient  Patient Orientation: Alert and Oriented, Person, Place, Situation, Self    Patient Cognition: Other (see comment) (DROWSY)    Hospitalization in the last 30 days (Readmission):  No    If yes, Readmission Assessment in CM Navigator will be completed. Advance Directives:      Code Status: Full Code   Patient's Primary Decision Maker is: Legal Next of Kin      Discharge Planning:    Patient lives with: Children Type of Home: House  Primary Care Giver: Self  Patient Support Systems include: Children, Family Members   Current Financial resources:    Current community resources:    Current services prior to admission: C-pap            Current DME:              Type of Home Care services:  None    ADLS  Prior functional level: Independent in ADLs/IADLs  Current functional level: Independent in ADLs/IADLs    PT AM-PAC:   /24  OT AM-PAC:   /24    Family can provide assistance at DC: Yes  Would you like Case Management to discuss the discharge plan with any other family members/significant others, and if so, who?  Yes (PT SISTER)  Plans to Return to Present Housing: Yes  Other Identified Issues/Barriers to RETURNING to current housing: POSSIBLE ALCOHOL REHAB  Potential Assistance needed at discharge: N/A

## 2023-06-21 VITALS
TEMPERATURE: 98.4 F | HEIGHT: 71 IN | OXYGEN SATURATION: 95 % | WEIGHT: 220.7 LBS | BODY MASS INDEX: 30.9 KG/M2 | DIASTOLIC BLOOD PRESSURE: 95 MMHG | RESPIRATION RATE: 16 BRPM | SYSTOLIC BLOOD PRESSURE: 142 MMHG | HEART RATE: 69 BPM

## 2023-06-21 LAB
ALBUMIN SERPL-MCNC: 3.5 G/DL (ref 3.5–4.6)
ALP SERPL-CCNC: 168 U/L (ref 35–104)
ALT SERPL-CCNC: 52 U/L (ref 0–41)
ANION GAP SERPL CALCULATED.3IONS-SCNC: 14 MEQ/L (ref 9–15)
AST SERPL-CCNC: 48 U/L (ref 0–40)
BASOPHILS # BLD: 0 K/UL (ref 0–0.2)
BASOPHILS NFR BLD: 0.8 %
BILIRUB SERPL-MCNC: 0.7 MG/DL (ref 0.2–0.7)
BUN SERPL-MCNC: 6 MG/DL (ref 6–20)
CALCIUM SERPL-MCNC: 8.4 MG/DL (ref 8.5–9.9)
CHLORIDE SERPL-SCNC: 105 MEQ/L (ref 95–107)
CO2 SERPL-SCNC: 21 MEQ/L (ref 20–31)
CREAT SERPL-MCNC: 0.71 MG/DL (ref 0.7–1.2)
EOSINOPHIL # BLD: 0.3 K/UL (ref 0–0.7)
EOSINOPHIL NFR BLD: 5.6 %
ERYTHROCYTE [DISTWIDTH] IN BLOOD BY AUTOMATED COUNT: 13.5 % (ref 11.5–14.5)
GLOBULIN SER CALC-MCNC: 2.4 G/DL (ref 2.3–3.5)
GLUCOSE BLD-MCNC: 134 MG/DL (ref 70–99)
GLUCOSE BLD-MCNC: 86 MG/DL (ref 70–99)
GLUCOSE BLD-MCNC: 91 MG/DL (ref 70–99)
GLUCOSE SERPL-MCNC: 89 MG/DL (ref 70–99)
HCT VFR BLD AUTO: 38.1 % (ref 42–52)
HGB BLD-MCNC: 13.4 G/DL (ref 14–18)
LYMPHOCYTES # BLD: 1.5 K/UL (ref 1–4.8)
LYMPHOCYTES NFR BLD: 28.6 %
MAGNESIUM SERPL-MCNC: 2.2 MG/DL (ref 1.7–2.4)
MCH RBC QN AUTO: 30.3 PG (ref 27–31.3)
MCHC RBC AUTO-ENTMCNC: 35.1 % (ref 33–37)
MCV RBC AUTO: 86.4 FL (ref 79–92.2)
MONOCYTES # BLD: 0.3 K/UL (ref 0.2–0.8)
MONOCYTES NFR BLD: 6.5 %
NEUTROPHILS # BLD: 3.1 K/UL (ref 1.4–6.5)
NEUTS SEG NFR BLD: 58.5 %
PERFORMED ON: ABNORMAL
PERFORMED ON: NORMAL
PERFORMED ON: NORMAL
PHOSPHATE SERPL-MCNC: 3.9 MG/DL (ref 2.3–4.8)
PLATELET # BLD AUTO: 228 K/UL (ref 130–400)
POTASSIUM SERPL-SCNC: 3.3 MEQ/L (ref 3.4–4.9)
PROT SERPL-MCNC: 5.9 G/DL (ref 6.3–8)
RBC # BLD AUTO: 4.41 M/UL (ref 4.7–6.1)
SODIUM SERPL-SCNC: 140 MEQ/L (ref 135–144)
WBC # BLD AUTO: 5.3 K/UL (ref 4.8–10.8)

## 2023-06-21 PROCEDURE — 6370000000 HC RX 637 (ALT 250 FOR IP): Performed by: INTERNAL MEDICINE

## 2023-06-21 PROCEDURE — 83735 ASSAY OF MAGNESIUM: CPT

## 2023-06-21 PROCEDURE — 6360000002 HC RX W HCPCS: Performed by: INTERNAL MEDICINE

## 2023-06-21 PROCEDURE — 85025 COMPLETE CBC W/AUTO DIFF WBC: CPT

## 2023-06-21 PROCEDURE — 36415 COLL VENOUS BLD VENIPUNCTURE: CPT

## 2023-06-21 PROCEDURE — 80053 COMPREHEN METABOLIC PANEL: CPT

## 2023-06-21 PROCEDURE — 2580000003 HC RX 258: Performed by: INTERNAL MEDICINE

## 2023-06-21 PROCEDURE — 84100 ASSAY OF PHOSPHORUS: CPT

## 2023-06-21 RX ORDER — CHLORDIAZEPOXIDE HYDROCHLORIDE 10 MG/1
10 CAPSULE, GELATIN COATED ORAL 2 TIMES DAILY
Qty: 3 CAPSULE | Refills: 0 | Status: SHIPPED | OUTPATIENT
Start: 2023-06-21 | End: 2023-06-23

## 2023-06-21 RX ORDER — FOLIC ACID 1 MG/1
1 TABLET ORAL DAILY
Status: DISCONTINUED | OUTPATIENT
Start: 2023-06-21 | End: 2023-06-21 | Stop reason: HOSPADM

## 2023-06-21 RX ORDER — POTASSIUM CHLORIDE 20 MEQ/1
40 TABLET, EXTENDED RELEASE ORAL ONCE
Status: COMPLETED | OUTPATIENT
Start: 2023-06-21 | End: 2023-06-21

## 2023-06-21 RX ADMIN — INSULIN GLARGINE 5 UNITS: 100 INJECTION, SOLUTION SUBCUTANEOUS at 09:31

## 2023-06-21 RX ADMIN — LORAZEPAM 1 MG: 1 TABLET ORAL at 09:38

## 2023-06-21 RX ADMIN — THIAMINE HYDROCHLORIDE 100 MG: 100 INJECTION, SOLUTION INTRAMUSCULAR; INTRAVENOUS at 10:20

## 2023-06-21 RX ADMIN — SODIUM CHLORIDE 3000 MG: 900 INJECTION INTRAVENOUS at 06:41

## 2023-06-21 RX ADMIN — POTASSIUM CHLORIDE 40 MEQ: 1500 TABLET, EXTENDED RELEASE ORAL at 16:37

## 2023-06-21 RX ADMIN — CHLORDIAZEPOXIDE HYDROCHLORIDE 25 MG: 25 CAPSULE ORAL at 21:18

## 2023-06-21 RX ADMIN — ENOXAPARIN SODIUM 40 MG: 100 INJECTION SUBCUTANEOUS at 09:31

## 2023-06-21 RX ADMIN — SODIUM CHLORIDE 3000 MG: 900 INJECTION INTRAVENOUS at 00:50

## 2023-06-21 RX ADMIN — LORAZEPAM 2 MG: 1 TABLET ORAL at 01:52

## 2023-06-21 RX ADMIN — LORAZEPAM 2 MG: 1 TABLET ORAL at 00:47

## 2023-06-21 ASSESSMENT — PAIN SCALES - GENERAL: PAINLEVEL_OUTOF10: 1

## 2023-06-21 NOTE — PROGRESS NOTES
Hospitalist Progress Note      Date of Admission: 6/18/2023  Chief Complaint:    No chief complaint on file. Subjective:  No new complaints.   No nausea, vomiting, chest pain, or headache      Medications:    Infusion Medications    sodium chloride      dextrose       Scheduled Medications    folic acid  1 mg Oral Daily    LORazepam  2 mg IntraVENous Once    chlordiazePOXIDE  25 mg Oral Q6H    nystatin  5 mL Oral 4x Daily    sodium chloride flush  5-40 mL IntraVENous 2 times per day    enoxaparin  40 mg SubCUTAneous Daily    thiamine  100 mg IntraVENous Daily    insulin glargine  5 Units SubCUTAneous BID    insulin lispro  0-8 Units SubCUTAneous TID WC    insulin lispro  0-4 Units SubCUTAneous Nightly     PRN Meds: sodium chloride flush, sodium chloride, ondansetron **OR** ondansetron, polyethylene glycol, acetaminophen **OR** acetaminophen, LORazepam **OR** LORazepam **OR** LORazepam **OR** LORazepam **OR** LORazepam **OR** LORazepam **OR** LORazepam **OR** LORazepam, glucose, dextrose bolus **OR** dextrose bolus, glucagon (rDNA), dextrose    Intake/Output Summary (Last 24 hours) at 6/21/2023 1514  Last data filed at 6/20/2023 2021  Gross per 24 hour   Intake 240 ml   Output --   Net 240 ml       Exam:  BP (!) 142/95   Pulse 69   Temp 98.4 °F (36.9 °C) (Oral)   Resp 16   Ht 5' 11\" (1.803 m)   Wt 220 lb 11.2 oz (100.1 kg)   SpO2 95%   BMI 30.78 kg/m²   Head: Normocephalic, atraumatic  Sclera clear  Neck JVD flat  Lungs: normal effort of breathing    Labs:   Recent Labs     06/19/23  0421 06/20/23  0534 06/21/23  0510   WBC 5.3 4.8 5.3   HGB 11.9* 12.6* 13.4*   HCT 34.0* 35.6* 38.1*    226 228       Recent Labs     06/19/23  0421 06/20/23  0534 06/21/23  0510    139 140   K 3.5 3.3* 3.3*   * 105 105   CO2 22 22 21   BUN 11 6 6   CREATININE 0.72 0.79 0.71   CALCIUM 7.8* 8.3* 8.4*   PHOS 2.3 3.5 3.9   AST 30 47* 48*   ALT 30 43* 52*   BILITOT 0.7 0.7 0.7   ALKPHOS 151* 168* 168*       No

## 2023-06-21 NOTE — DISCHARGE SUMMARY
Hospital Medicine Discharge Summary    Peter Espana  :  1975  MRN:  [id number]    Admit date:  2023  Discharge date:  2023    Admitting Physician:  Ivanna Luna DO  Primary Care Physician:  [address] [facility],[address]    Peter Espana is a 46 y.o. male that was admitted and treated for the following medical issues:     Principal Problem:    Alcohol withdrawal hallucinosis ([address]  Active Problems:    Lactic acidosis  Resolved Problems:    * No resolved hospital problems. *      Discharge Diagnoses:    Principal Problem:    Alcohol withdrawal hallucinosis ([address]  Active Problems:    Lactic acidosis  Resolved Problems:    * No resolved hospital problems. *    No chief complaint on file. Hospital Course:   Peter Espana is a 46 y.o. male who was admitted to the hospital with fall. Had some soft tissue discomfort due to fall including by mouth. This subsequently resolved. Alcohol withdrawal due to daily vodka use, patient intends to quit. Symptoms controlled with Librium. Subsequently discharged with outpatient follow-up. Group therapy options including lets get real program were offered. Pt was discharge in a stable condition. BP (!) 142/95   Pulse 69   Temp 98.4 °F (36.9 °C) (Oral)   Resp 16   Ht 5' 11\" (1.803 m)   Wt 220 lb 11.2 oz (100.1 kg)   SpO2 95%   BMI 30.78 kg/m²     Patient was seen by the following consultants  Consults:  IP CONSULT TO SOCIAL WORK  IP CONSULT TO CRITICAL CARE    Significant Diagnostic Studies:    Refer to chart if  XR CHEST PORTABLE    Result Date: 2023  EXAMINATION: ONE XRAY VIEW OF THE CHEST 2023 12:45 am COMPARISON: None. HISTORY: ORDERING SYSTEM PROVIDED HISTORY: Alcohol withdrawal TECHNOLOGIST PROVIDED HISTORY: Reason for exam:->Alcohol withdrawal What reading provider will be dictating this exam?->CRC FINDINGS: The lungs are without acute focal process. There is no effusion or pneumothorax.  The cardiomediastinal silhouette

## 2023-06-21 NOTE — FLOWSHEET NOTE
Pt resting in bed at this time. Pt states his pain is about a 2. CIWAA  score now is a zero.   .Electronically signed by Blanco Martinez LPN on 9/20/9962 at 4:99 AM

## 2023-06-21 NOTE — PROGRESS NOTES
Hospitalist Progress Note      Date of Admission: 6/18/2023  Chief Complaint:    No chief complaint on file. Subjective:  No new complaints.   No nausea, vomiting, chest pain, or headache      Medications:    Infusion Medications    sodium chloride      dextrose       Scheduled Medications    LORazepam  2 mg IntraVENous Once    [START ON 6/21/2023] chlordiazePOXIDE  25 mg Oral Q6H    nystatin  5 mL Oral 4x Daily    sodium chloride flush  5-40 mL IntraVENous 2 times per day    enoxaparin  40 mg SubCUTAneous Daily    thiamine  100 mg IntraVENous Daily    folic acid IVPB  1 mg IntraVENous Daily    insulin glargine  5 Units SubCUTAneous BID    insulin lispro  0-8 Units SubCUTAneous TID WC    insulin lispro  0-4 Units SubCUTAneous Nightly    ampicillin-sulbactam  3,000 mg IntraVENous Q6H     PRN Meds: sodium chloride flush, sodium chloride, ondansetron **OR** ondansetron, polyethylene glycol, acetaminophen **OR** acetaminophen, LORazepam **OR** LORazepam **OR** LORazepam **OR** LORazepam **OR** LORazepam **OR** LORazepam **OR** LORazepam **OR** LORazepam, glucose, dextrose bolus **OR** dextrose bolus, glucagon (rDNA), dextrose  No intake or output data in the 24 hours ending 06/20/23 2242    Exam:  BP (!) 140/76   Pulse 75   Temp 97.6 °F (36.4 °C) (Oral)   Resp 18   Ht 5' 11\" (1.803 m)   Wt 228 lb 12.8 oz (103.8 kg)   SpO2 95%   BMI 31.91 kg/m²   Head: Normocephalic, atraumatic  Sclera clear  Neck JVD flat  Lungs: normal effort of breathing    Labs:   Recent Labs     06/18/23  0713 06/19/23  0421 06/20/23  0534   WBC 7.2 5.3 4.8   HGB 12.0* 11.9* 12.6*   HCT 34.9* 34.0* 35.6*    240 226       Recent Labs     06/18/23  0713 06/19/23  0421 06/20/23  0534    140 139   K 3.3* 3.5 3.3*    108* 105   CO2 21 22 22   BUN 13 11 6   CREATININE 0.66* 0.72 0.79   CALCIUM 7.7* 7.8* 8.3*   PHOS 1.6* 2.3 3.5   AST 20 30 47*   ALT 30 30 43*   BILITOT 0.8* 0.7 0.7   ALKPHOS 160* 151* 168*       No results for

## 2023-06-23 LAB
BACTERIA BLD CULT ORG #2: NORMAL
BACTERIA BLD CULT: NORMAL

## 2023-06-26 ENCOUNTER — HOSPITAL ENCOUNTER (EMERGENCY)
Age: 48
Discharge: LEFT BEFORE DISCHARGE | End: 2023-06-26

## 2023-06-26 ENCOUNTER — HOSPITAL ENCOUNTER (EMERGENCY)
Age: 48
Discharge: HOME | End: 2023-06-26
Attending: EMERGENCY MEDICINE
Payer: MEDICAID

## 2023-06-26 ENCOUNTER — APPOINTMENT (OUTPATIENT)
Dept: GENERAL RADIOLOGY | Age: 48
End: 2023-06-26
Payer: MEDICAID

## 2023-06-26 VITALS
OXYGEN SATURATION: 96 % | HEIGHT: 72 IN | HEART RATE: 114 BPM | DIASTOLIC BLOOD PRESSURE: 100 MMHG | RESPIRATION RATE: 24 BRPM | BODY MASS INDEX: 29.8 KG/M2 | SYSTOLIC BLOOD PRESSURE: 137 MMHG | WEIGHT: 220 LBS | TEMPERATURE: 97.8 F

## 2023-06-26 DIAGNOSIS — R25.1 ANXIETY RELATED TREMOR: ICD-10-CM

## 2023-06-26 DIAGNOSIS — F10.20 CHRONIC ALCOHOLISM (HCC): Primary | ICD-10-CM

## 2023-06-26 DIAGNOSIS — S91.311A LACERATION OF RIGHT FOOT, INITIAL ENCOUNTER: ICD-10-CM

## 2023-06-26 DIAGNOSIS — F41.9 ANXIETY RELATED TREMOR: ICD-10-CM

## 2023-06-26 LAB
ALBUMIN SERPL-MCNC: 4.6 G/DL (ref 3.5–4.6)
ALP SERPL-CCNC: 171 U/L (ref 35–104)
ALT SERPL-CCNC: 98 U/L (ref 0–41)
AMORPH SED URNS QL MICRO: ABNORMAL
AMPHET UR QL SCN: ABNORMAL
AMPHETAMINES UR QL SCN>500 NG/ML: ABNORMAL
AMYLASE SERPL-CCNC: 20 U/L (ref 22–93)
ANION GAP SERPL CALCULATED.3IONS-SCNC: 24 MEQ/L (ref 9–15)
AST SERPL-CCNC: 100 U/L (ref 0–40)
B-OH-BUTYR SERPL-SCNC: 27.2 MG/DL (ref 0.2–2.8)
BACTERIA URNS QL MICRO: ABNORMAL /HPF
BARBITURATES UR QL SCN: ABNORMAL
BARBITURATES UR QL SCN>200 NG/ML: ABNORMAL
BASE EXCESS ARTERIAL: -5
BASOPHILS # BLD: 0.1 K/UL (ref 0–0.1)
BASOPHILS NFR BLD: 1 % (ref 0.2–1.2)
BENZODIAZ UR QL SCN: POSITIVE
BENZODIAZ UR QL SCN: POSITIVE
BILIRUB SERPL-MCNC: 1 MG/DL (ref 0.2–0.7)
BILIRUB UR QL STRIP: ABNORMAL
BUN SERPL-MCNC: 16 MG/DL (ref 6–20)
CALCIUM SERPL-MCNC: 9.1 MG/DL (ref 8.5–9.9)
CANNABINOIDS UR QL SCN: POSITIVE
CHLORIDE SERPL-SCNC: 98 MEQ/L (ref 95–107)
CHP ED QC CHECK: YES
CLARITY UR: CLEAR
CO2 SERPL-SCNC: 17 MEQ/L (ref 20–31)
COARSE GRAN CASTS #/AREA URNS LPF: ABNORMAL /LPF (ref 0–5)
COCAINE UR QL SCN: ABNORMAL
COCAINE UR QL SCN: ABNORMAL
COLOR UR: YELLOW
CREAT SERPL-MCNC: 0.98 MG/DL (ref 0.7–1.2)
DRUG SCREEN COMMENT UR-IMP: ABNORMAL
DRUG SCREEN COMMENT UR-IMP: ABNORMAL
EOSINOPHIL # BLD: 0 K/UL (ref 0–0.5)
EOSINOPHIL NFR BLD: 0.2 % (ref 0.8–7)
EPI CELLS #/AREA URNS HPF: ABNORMAL /HPF
ERYTHROCYTE [DISTWIDTH] IN BLOOD BY AUTOMATED COUNT: 13.4 % (ref 11.6–14.4)
ETHANOL PERCENT: 0.14 G/DL
ETHANOLAMINE SERPL-MCNC: 160 MG/DL (ref 0–0.08)
FENTANYL SCREEN, URINE: ABNORMAL
FINE GRAN CASTS #/AREA URNS HPF: ABNORMAL /LPF (ref 0–5)
GLOBULIN SER CALC-MCNC: 3 G/DL (ref 2.3–3.5)
GLUCOSE BLD-MCNC: 126 MG/DL
GLUCOSE BLD-MCNC: 126 MG/DL (ref 70–99)
GLUCOSE SERPL-MCNC: ABNORMAL MG/DL (ref 70–99)
GLUCOSE UR STRIP-MCNC: NEGATIVE MG/DL
HCO3 ARTERIAL: 19.5 MMOL/L (ref 21–29)
HCT VFR BLD AUTO: 40.2 % (ref 42–52)
HGB BLD-MCNC: 14.1 G/DL (ref 13.7–17.5)
HGB UR QL STRIP: ABNORMAL
HYALINE CASTS #/AREA URNS LPF: ABNORMAL /LPF (ref 0–5)
IMM GRANULOCYTES # BLD: 0 K/UL
IMM GRANULOCYTES NFR BLD: 0.2 %
KETONES UR STRIP-MCNC: 40 MG/DL
LACTATE BLDV-SCNC: 3.2 MMOL/L (ref 0.5–2.2)
LACTATE BLDV-SCNC: 4.7 MMOL/L (ref 0.5–2.2)
LEUKOCYTE ESTERASE UR QL STRIP: NEGATIVE
LIPASE SERPL-CCNC: 17 U/L (ref 12–95)
LYMPHOCYTES # BLD: 1.2 K/UL (ref 1.3–3.6)
LYMPHOCYTES NFR BLD: 14.5 %
MAGNESIUM SERPL-MCNC: 2 MG/DL (ref 1.7–2.4)
MCH RBC QN AUTO: 29.9 PG (ref 25.7–32.2)
MCHC RBC AUTO-ENTMCNC: 35.1 % (ref 32.3–36.5)
MCV RBC AUTO: 85.2 FL (ref 79–92.2)
METHADONE UR QL SCN: ABNORMAL
MONOCYTES # BLD: 0.9 K/UL (ref 0.3–0.8)
MONOCYTES NFR BLD: 11.3 % (ref 5.3–12.2)
NEUTROPHILS # BLD: 6.1 K/UL (ref 1.8–5.4)
NEUTS SEG NFR BLD: 72.8 % (ref 34–67.9)
NITRITE UR QL STRIP: NEGATIVE
O2 SAT, ARTERIAL: 97 % (ref 93–100)
OPIATES UR QL SCN: ABNORMAL
OPIATES UR QL SCN: ABNORMAL
OXYCODONE UR QL SCN: ABNORMAL
PCO2 ARTERIAL: 30 MM HG (ref 35–45)
PCP UR QL SCN: ABNORMAL
PCP UR QL SCN>25 NG/ML: ABNORMAL
PERFORMED ON: ABNORMAL
PERFORMED ON: ABNORMAL
PH ARTERIAL: 7.42 (ref 7.35–7.45)
PH UR STRIP: 6 [PH] (ref 5–9)
PLATELET # BLD AUTO: 298 K/UL (ref 163–337)
PO2 ARTERIAL: 88 MM HG (ref 75–108)
POC SAMPLE TYPE: ABNORMAL
POTASSIUM SERPL-SCNC: 4.4 MEQ/L (ref 3.4–4.9)
PROPOXYPH UR QL SCN: ABNORMAL
PROT SERPL-MCNC: 7.6 G/DL (ref 6.3–8)
PROT UR STRIP-MCNC: 100 MG/DL
RBC # BLD AUTO: 4.72 M/UL (ref 4.63–6.08)
RBC #/AREA URNS HPF: ABNORMAL /HPF (ref 0–2)
SODIUM SERPL-SCNC: 139 MEQ/L (ref 135–144)
SP GR UR STRIP: >=1.03 (ref 1–1.03)
TCO2 ARTERIAL: 20 MMOL/L (ref 70–99)
THC UR QL SCN>50 NG/ML: POSITIVE
URINE REFLEX TO CULTURE: ABNORMAL
UROBILINOGEN UR STRIP-ACNC: 0.2 E.U./DL
WBC # BLD AUTO: 8.3 K/UL (ref 4.2–9)
WBC #/AREA URNS HPF: ABNORMAL /HPF (ref 0–5)

## 2023-06-26 PROCEDURE — 2580000003 HC RX 258: Performed by: EMERGENCY MEDICINE

## 2023-06-26 PROCEDURE — 82948 REAGENT STRIP/BLOOD GLUCOSE: CPT

## 2023-06-26 PROCEDURE — 99284 EMERGENCY DEPT VISIT MOD MDM: CPT

## 2023-06-26 PROCEDURE — 82803 BLOOD GASES ANY COMBINATION: CPT

## 2023-06-26 PROCEDURE — 80053 COMPREHEN METABOLIC PANEL: CPT

## 2023-06-26 PROCEDURE — 6370000000 HC RX 637 (ALT 250 FOR IP): Performed by: EMERGENCY MEDICINE

## 2023-06-26 PROCEDURE — 80306 DRUG TEST PRSMV INSTRMNT: CPT

## 2023-06-26 PROCEDURE — 82010 KETONE BODYS QUAN: CPT

## 2023-06-26 PROCEDURE — 96375 TX/PRO/DX INJ NEW DRUG ADDON: CPT

## 2023-06-26 PROCEDURE — 96374 THER/PROPH/DIAG INJ IV PUSH: CPT

## 2023-06-26 PROCEDURE — 82077 ASSAY SPEC XCP UR&BREATH IA: CPT

## 2023-06-26 PROCEDURE — 83735 ASSAY OF MAGNESIUM: CPT

## 2023-06-26 PROCEDURE — 2500000003 HC RX 250 WO HCPCS: Performed by: EMERGENCY MEDICINE

## 2023-06-26 PROCEDURE — 73630 X-RAY EXAM OF FOOT: CPT

## 2023-06-26 PROCEDURE — 83605 ASSAY OF LACTIC ACID: CPT

## 2023-06-26 PROCEDURE — 6360000002 HC RX W HCPCS: Performed by: EMERGENCY MEDICINE

## 2023-06-26 PROCEDURE — 85025 COMPLETE CBC W/AUTO DIFF WBC: CPT

## 2023-06-26 PROCEDURE — 81001 URINALYSIS AUTO W/SCOPE: CPT

## 2023-06-26 PROCEDURE — 96361 HYDRATE IV INFUSION ADD-ON: CPT

## 2023-06-26 PROCEDURE — 36600 WITHDRAWAL OF ARTERIAL BLOOD: CPT

## 2023-06-26 PROCEDURE — 36415 COLL VENOUS BLD VENIPUNCTURE: CPT

## 2023-06-26 PROCEDURE — 83690 ASSAY OF LIPASE: CPT

## 2023-06-26 PROCEDURE — 82150 ASSAY OF AMYLASE: CPT

## 2023-06-26 PROCEDURE — 80307 DRUG TEST PRSMV CHEM ANLYZR: CPT

## 2023-06-26 RX ORDER — THIAMINE HYDROCHLORIDE 100 MG/ML
100 INJECTION, SOLUTION INTRAMUSCULAR; INTRAVENOUS DAILY
Status: DISCONTINUED | OUTPATIENT
Start: 2023-06-26 | End: 2023-06-26 | Stop reason: HOSPADM

## 2023-06-26 RX ORDER — SODIUM CHLORIDE 9 MG/ML
INJECTION, SOLUTION INTRAVENOUS CONTINUOUS
Status: DISCONTINUED | OUTPATIENT
Start: 2023-06-26 | End: 2023-06-26 | Stop reason: HOSPADM

## 2023-06-26 RX ORDER — ONDANSETRON 4 MG/1
4 TABLET, FILM COATED ORAL EVERY 8 HOURS PRN
Qty: 20 TABLET | Refills: 0 | Status: SHIPPED | OUTPATIENT
Start: 2023-06-26

## 2023-06-26 RX ORDER — 0.9 % SODIUM CHLORIDE 0.9 %
1000 INTRAVENOUS SOLUTION INTRAVENOUS ONCE
Status: COMPLETED | OUTPATIENT
Start: 2023-06-26 | End: 2023-06-26

## 2023-06-26 RX ORDER — FOLIC ACID 5 MG/ML
1 INJECTION, SOLUTION INTRAMUSCULAR; INTRAVENOUS; SUBCUTANEOUS DAILY
Status: DISCONTINUED | OUTPATIENT
Start: 2023-06-26 | End: 2023-06-26 | Stop reason: HOSPADM

## 2023-06-26 RX ORDER — CEPHALEXIN 500 MG/1
500 CAPSULE ORAL ONCE
Status: COMPLETED | OUTPATIENT
Start: 2023-06-26 | End: 2023-06-26

## 2023-06-26 RX ORDER — GINSENG 100 MG
CAPSULE ORAL ONCE
Status: COMPLETED | OUTPATIENT
Start: 2023-06-26 | End: 2023-06-26

## 2023-06-26 RX ORDER — LORAZEPAM 2 MG/ML
2 INJECTION INTRAMUSCULAR ONCE
Status: COMPLETED | OUTPATIENT
Start: 2023-06-26 | End: 2023-06-26

## 2023-06-26 RX ORDER — CHLORDIAZEPOXIDE HYDROCHLORIDE 5 MG/1
25 CAPSULE, GELATIN COATED ORAL ONCE
Status: COMPLETED | OUTPATIENT
Start: 2023-06-26 | End: 2023-06-26

## 2023-06-26 RX ORDER — CHLORDIAZEPOXIDE HYDROCHLORIDE 25 MG/1
25 CAPSULE, GELATIN COATED ORAL 3 TIMES DAILY PRN
Qty: 30 CAPSULE | Refills: 3 | Status: SHIPPED | OUTPATIENT
Start: 2023-06-26 | End: 2023-07-06

## 2023-06-26 RX ADMIN — BACITRACIN: 500 OINTMENT TOPICAL at 12:20

## 2023-06-26 RX ADMIN — CEPHALEXIN 500 MG: 500 CAPSULE ORAL at 12:17

## 2023-06-26 RX ADMIN — FOLIC ACID 1 MG: 5 INJECTION, SOLUTION INTRAMUSCULAR; INTRAVENOUS; SUBCUTANEOUS at 11:01

## 2023-06-26 RX ADMIN — THIAMINE HYDROCHLORIDE 100 MG: 100 INJECTION, SOLUTION INTRAMUSCULAR; INTRAVENOUS at 11:01

## 2023-06-26 RX ADMIN — LORAZEPAM 2 MG: 2 INJECTION INTRAMUSCULAR; INTRAVENOUS at 10:59

## 2023-06-26 RX ADMIN — CHLORDIAZEPOXIDE HYDROCHLORIDE 25 MG: 5 CAPSULE ORAL at 12:17

## 2023-06-26 RX ADMIN — SODIUM CHLORIDE: 9 INJECTION, SOLUTION INTRAVENOUS at 13:08

## 2023-06-26 RX ADMIN — SODIUM CHLORIDE 1000 ML: 9 INJECTION, SOLUTION INTRAVENOUS at 11:01

## 2023-06-26 RX ADMIN — SODIUM CHLORIDE 1000 ML: 9 INJECTION, SOLUTION INTRAVENOUS at 12:07

## 2023-06-26 ASSESSMENT — LIFESTYLE VARIABLES
HOW MANY STANDARD DRINKS CONTAINING ALCOHOL DO YOU HAVE ON A TYPICAL DAY: 10 OR MORE
HOW OFTEN DO YOU HAVE A DRINK CONTAINING ALCOHOL: 4 OR MORE TIMES A WEEK

## 2023-06-26 ASSESSMENT — ENCOUNTER SYMPTOMS
SORE THROAT: 0
ABDOMINAL PAIN: 0
DIARRHEA: 0
SINUS PRESSURE: 0
VOICE CHANGE: 0
EYE PAIN: 0
CONSTIPATION: 0
VOMITING: 1
CHEST TIGHTNESS: 0
EYE DISCHARGE: 0
CHOKING: 0
EYE REDNESS: 0
SHORTNESS OF BREATH: 0
NAUSEA: 1
TROUBLE SWALLOWING: 0
STRIDOR: 0
BLOOD IN STOOL: 0
WHEEZING: 0
COUGH: 0
FACIAL SWELLING: 0
BACK PAIN: 0

## 2023-06-26 ASSESSMENT — PAIN - FUNCTIONAL ASSESSMENT: PAIN_FUNCTIONAL_ASSESSMENT: NONE - DENIES PAIN

## 2023-06-29 ENCOUNTER — HOSPITAL ENCOUNTER (EMERGENCY)
Age: 48
Discharge: HOME | End: 2023-06-29
Attending: EMERGENCY MEDICINE
Payer: MEDICAID

## 2023-06-29 VITALS
WEIGHT: 200 LBS | OXYGEN SATURATION: 99 % | SYSTOLIC BLOOD PRESSURE: 128 MMHG | BODY MASS INDEX: 28 KG/M2 | RESPIRATION RATE: 18 BRPM | HEIGHT: 71 IN | TEMPERATURE: 98.3 F | HEART RATE: 80 BPM | DIASTOLIC BLOOD PRESSURE: 75 MMHG

## 2023-06-29 DIAGNOSIS — F10.10 ALCOHOL ABUSE: Primary | ICD-10-CM

## 2023-06-29 LAB
AMPHET UR QL SCN: ABNORMAL
ANION GAP SERPL CALCULATED.3IONS-SCNC: 16 MEQ/L (ref 9–15)
BARBITURATES UR QL SCN: ABNORMAL
BENZODIAZ UR QL SCN: POSITIVE
BUN SERPL-MCNC: 7 MG/DL (ref 6–20)
CALCIUM SERPL-MCNC: 8.9 MG/DL (ref 8.5–9.9)
CANNABINOIDS UR QL SCN: POSITIVE
CHLORIDE SERPL-SCNC: 103 MEQ/L (ref 95–107)
CO2 SERPL-SCNC: 22 MEQ/L (ref 20–31)
COCAINE UR QL SCN: ABNORMAL
CREAT SERPL-MCNC: 0.69 MG/DL (ref 0.7–1.2)
DRUG SCREEN COMMENT UR-IMP: ABNORMAL
EKG ATRIAL RATE: 97 BPM
EKG P AXIS: 30 DEGREES
EKG P-R INTERVAL: 148 MS
EKG Q-T INTERVAL: 372 MS
EKG QRS DURATION: 94 MS
EKG QTC CALCULATION (BAZETT): 472 MS
EKG R AXIS: 14 DEGREES
EKG T AXIS: 10 DEGREES
EKG VENTRICULAR RATE: 97 BPM
ETHANOL PERCENT: NORMAL G/DL
ETHANOLAMINE SERPL-MCNC: <10 MG/DL (ref 0–0.08)
FENTANYL SCREEN, URINE: ABNORMAL
GLUCOSE SERPL-MCNC: 114 MG/DL (ref 70–99)
MAGNESIUM SERPL-MCNC: 2 MG/DL (ref 1.7–2.4)
METHADONE UR QL SCN: ABNORMAL
OPIATES UR QL SCN: ABNORMAL
OXYCODONE UR QL SCN: ABNORMAL
PCP UR QL SCN: ABNORMAL
POTASSIUM SERPL-SCNC: 3.4 MEQ/L (ref 3.4–4.9)
PROPOXYPH UR QL SCN: ABNORMAL
REASON FOR REJECTION: NORMAL
REJECTED TEST: NORMAL
SODIUM SERPL-SCNC: 141 MEQ/L (ref 135–144)

## 2023-06-29 PROCEDURE — 82077 ASSAY SPEC XCP UR&BREATH IA: CPT

## 2023-06-29 PROCEDURE — 80048 BASIC METABOLIC PNL TOTAL CA: CPT

## 2023-06-29 PROCEDURE — 93010 ELECTROCARDIOGRAM REPORT: CPT | Performed by: INTERNAL MEDICINE

## 2023-06-29 PROCEDURE — 80307 DRUG TEST PRSMV CHEM ANLYZR: CPT

## 2023-06-29 PROCEDURE — 36415 COLL VENOUS BLD VENIPUNCTURE: CPT

## 2023-06-29 PROCEDURE — 93005 ELECTROCARDIOGRAM TRACING: CPT | Performed by: EMERGENCY MEDICINE

## 2023-06-29 PROCEDURE — 99284 EMERGENCY DEPT VISIT MOD MDM: CPT

## 2023-06-29 PROCEDURE — 83735 ASSAY OF MAGNESIUM: CPT

## 2023-06-29 ASSESSMENT — PAIN - FUNCTIONAL ASSESSMENT
PAIN_FUNCTIONAL_ASSESSMENT: NONE - DENIES PAIN

## 2023-09-27 ENCOUNTER — HOSPITAL ENCOUNTER (EMERGENCY)
Age: 48
Discharge: HOME | End: 2023-09-28
Attending: EMERGENCY MEDICINE
Payer: MEDICAID

## 2023-09-27 DIAGNOSIS — R73.9 HYPERGLYCEMIA: ICD-10-CM

## 2023-09-27 DIAGNOSIS — R09.81 NASAL CONGESTION: ICD-10-CM

## 2023-09-27 DIAGNOSIS — R10.13 ABDOMINAL PAIN, EPIGASTRIC: ICD-10-CM

## 2023-09-27 DIAGNOSIS — R11.2 NAUSEA AND VOMITING, UNSPECIFIED VOMITING TYPE: Primary | ICD-10-CM

## 2023-09-27 LAB
ALBUMIN SERPL-MCNC: 4.5 G/DL (ref 3.5–4.6)
ALP SERPL-CCNC: 183 U/L (ref 35–104)
ALT SERPL-CCNC: 86 U/L (ref 0–41)
AMPHETAMINES UR QL SCN>500 NG/ML: ABNORMAL
AMYLASE SERPL-CCNC: 35 U/L (ref 22–93)
ANION GAP SERPL CALCULATED.3IONS-SCNC: 14 MEQ/L (ref 9–15)
AST SERPL-CCNC: 55 U/L (ref 0–40)
BACTERIA URNS QL MICRO: NEGATIVE /HPF
BARBITURATES UR QL SCN>200 NG/ML: ABNORMAL
BASOPHILS # BLD: 0.1 K/UL (ref 0–0.1)
BASOPHILS NFR BLD: 1 % (ref 0.2–1.2)
BENZODIAZ UR QL SCN: POSITIVE
BILIRUB SERPL-MCNC: 0.6 MG/DL (ref 0.2–0.7)
BILIRUB UR QL STRIP: NEGATIVE
BUN SERPL-MCNC: 18 MG/DL (ref 6–20)
BUPRENORPHINE QUAL, URINE: ABNORMAL
CALCIUM SERPL-MCNC: 9.3 MG/DL (ref 8.5–9.9)
CHLORIDE SERPL-SCNC: 102 MEQ/L (ref 95–107)
CLARITY UR: CLEAR
CO2 SERPL-SCNC: 21 MEQ/L (ref 20–31)
COCAINE UR QL SCN: ABNORMAL
COLOR UR: YELLOW
CREAT SERPL-MCNC: 0.84 MG/DL (ref 0.7–1.2)
DRUG SCREEN COMMENT UR-IMP: ABNORMAL
EOSINOPHIL # BLD: 0.4 K/UL (ref 0–0.5)
EOSINOPHIL NFR BLD: 6 % (ref 0.8–7)
EPI CELLS #/AREA URNS HPF: NORMAL /HPF
ERYTHROCYTE [DISTWIDTH] IN BLOOD BY AUTOMATED COUNT: 12.5 % (ref 11.6–14.4)
ETHANOL PERCENT: NORMAL G/DL
ETHANOLAMINE SERPL-MCNC: <10 MG/DL (ref 0–0.08)
GLOBULIN SER CALC-MCNC: 3.2 G/DL (ref 2.3–3.5)
GLUCOSE BLD-MCNC: 331 MG/DL (ref 70–99)
GLUCOSE SERPL-MCNC: 299 MG/DL (ref 70–99)
GLUCOSE UR STRIP-MCNC: 500 MG/DL
HCT VFR BLD AUTO: 44.1 % (ref 42–52)
HGB BLD-MCNC: 15.3 G/DL (ref 13.7–17.5)
HGB UR QL STRIP: NEGATIVE
IMM GRANULOCYTES # BLD: 0.1 K/UL
IMM GRANULOCYTES NFR BLD: 0.9 %
KETONES UR STRIP-MCNC: 15 MG/DL
LACTATE BLDV-SCNC: 1.5 MMOL/L (ref 0.5–2.2)
LEUKOCYTE ESTERASE UR QL STRIP: NEGATIVE
LIPASE SERPL-CCNC: 31 U/L (ref 12–95)
LYMPHOCYTES # BLD: 1.4 K/UL (ref 1.3–3.6)
LYMPHOCYTES NFR BLD: 20.4 %
MCH RBC QN AUTO: 28.1 PG (ref 25.7–32.2)
MCHC RBC AUTO-ENTMCNC: 34.7 % (ref 32.3–36.5)
MCV RBC AUTO: 81.1 FL (ref 79–92.2)
MONOCYTES # BLD: 0.4 K/UL (ref 0.3–0.8)
MONOCYTES NFR BLD: 5.7 % (ref 5.3–12.2)
NEUTROPHILS # BLD: 4.6 K/UL (ref 1.8–5.4)
NEUTS SEG NFR BLD: 66 % (ref 34–67.9)
NITRITE UR QL STRIP: NEGATIVE
OPIATES UR QL SCN: ABNORMAL
PCP UR QL SCN>25 NG/ML: ABNORMAL
PERFORMED ON: ABNORMAL
PH UR STRIP: 5.5 [PH] (ref 5–9)
PLATELET # BLD AUTO: 268 K/UL (ref 163–337)
POTASSIUM SERPL-SCNC: 4.3 MEQ/L (ref 3.4–4.9)
PROT SERPL-MCNC: 7.7 G/DL (ref 6.3–8)
PROT UR STRIP-MCNC: 30 MG/DL
RBC # BLD AUTO: 5.44 M/UL (ref 4.63–6.08)
RBC #/AREA URNS HPF: NORMAL /HPF (ref 0–2)
SARS-COV-2 RDRP RESP QL NAA+PROBE: NOT DETECTED
SODIUM SERPL-SCNC: 137 MEQ/L (ref 135–144)
SP GR UR STRIP: >=1.03 (ref 1–1.03)
THC UR QL SCN>50 NG/ML: POSITIVE
TRICYCLICS UR QL SCN: POSITIVE
URINE REFLEX TO CULTURE: ABNORMAL
UROBILINOGEN UR STRIP-ACNC: 0.2 E.U./DL
WBC # BLD AUTO: 7 K/UL (ref 4.2–9)
WBC #/AREA URNS HPF: NORMAL /HPF (ref 0–5)

## 2023-09-27 PROCEDURE — 83605 ASSAY OF LACTIC ACID: CPT

## 2023-09-27 PROCEDURE — 36415 COLL VENOUS BLD VENIPUNCTURE: CPT

## 2023-09-27 PROCEDURE — 81001 URINALYSIS AUTO W/SCOPE: CPT

## 2023-09-27 PROCEDURE — 82010 KETONE BODYS QUAN: CPT

## 2023-09-27 PROCEDURE — 85025 COMPLETE CBC W/AUTO DIFF WBC: CPT

## 2023-09-27 PROCEDURE — 2580000003 HC RX 258: Performed by: EMERGENCY MEDICINE

## 2023-09-27 PROCEDURE — 82077 ASSAY SPEC XCP UR&BREATH IA: CPT

## 2023-09-27 PROCEDURE — 2500000003 HC RX 250 WO HCPCS: Performed by: EMERGENCY MEDICINE

## 2023-09-27 PROCEDURE — 96375 TX/PRO/DX INJ NEW DRUG ADDON: CPT

## 2023-09-27 PROCEDURE — 80306 DRUG TEST PRSMV INSTRMNT: CPT

## 2023-09-27 PROCEDURE — A4216 STERILE WATER/SALINE, 10 ML: HCPCS | Performed by: EMERGENCY MEDICINE

## 2023-09-27 PROCEDURE — 99284 EMERGENCY DEPT VISIT MOD MDM: CPT

## 2023-09-27 PROCEDURE — 6360000002 HC RX W HCPCS: Performed by: EMERGENCY MEDICINE

## 2023-09-27 PROCEDURE — 83690 ASSAY OF LIPASE: CPT

## 2023-09-27 PROCEDURE — 96374 THER/PROPH/DIAG INJ IV PUSH: CPT

## 2023-09-27 PROCEDURE — 82150 ASSAY OF AMYLASE: CPT

## 2023-09-27 PROCEDURE — 80053 COMPREHEN METABOLIC PANEL: CPT

## 2023-09-27 PROCEDURE — 87635 SARS-COV-2 COVID-19 AMP PRB: CPT

## 2023-09-27 RX ORDER — METOCLOPRAMIDE HYDROCHLORIDE 5 MG/ML
10 INJECTION INTRAMUSCULAR; INTRAVENOUS ONCE
Status: COMPLETED | OUTPATIENT
Start: 2023-09-27 | End: 2023-09-27

## 2023-09-27 RX ORDER — PROMETHAZINE HYDROCHLORIDE 25 MG/1
25 TABLET ORAL 4 TIMES DAILY PRN
Qty: 20 TABLET | Refills: 0 | Status: SHIPPED | OUTPATIENT
Start: 2023-09-27 | End: 2023-10-02

## 2023-09-27 RX ORDER — 0.9 % SODIUM CHLORIDE 0.9 %
1000 INTRAVENOUS SOLUTION INTRAVENOUS ONCE
Status: COMPLETED | OUTPATIENT
Start: 2023-09-27 | End: 2023-09-28

## 2023-09-27 RX ORDER — GUAIFENESIN AND PSEUDOEPHEDRINE HCL 1200; 120 MG/1; MG/1
1 TABLET, EXTENDED RELEASE ORAL 2 TIMES DAILY
Qty: 20 TABLET | Refills: 0 | Status: SHIPPED | OUTPATIENT
Start: 2023-09-27

## 2023-09-27 RX ORDER — ONDANSETRON 2 MG/ML
4 INJECTION INTRAMUSCULAR; INTRAVENOUS ONCE
Status: COMPLETED | OUTPATIENT
Start: 2023-09-27 | End: 2023-09-27

## 2023-09-27 RX ADMIN — SODIUM CHLORIDE 1000 ML: 9 INJECTION, SOLUTION INTRAVENOUS at 22:55

## 2023-09-27 RX ADMIN — FAMOTIDINE 20 MG: 10 INJECTION, SOLUTION INTRAVENOUS at 23:13

## 2023-09-27 RX ADMIN — METOCLOPRAMIDE HYDROCHLORIDE 10 MG: 5 INJECTION INTRAMUSCULAR; INTRAVENOUS at 23:13

## 2023-09-27 RX ADMIN — ONDANSETRON 4 MG: 2 INJECTION INTRAMUSCULAR; INTRAVENOUS at 23:13

## 2023-09-27 ASSESSMENT — ENCOUNTER SYMPTOMS
BACK PAIN: 0
ABDOMINAL PAIN: 1
WHEEZING: 0
DIARRHEA: 0
SORE THROAT: 0
STRIDOR: 0
EYE REDNESS: 0
FACIAL SWELLING: 0
VOICE CHANGE: 0
SINUS PRESSURE: 0
CHOKING: 0
COUGH: 0
NAUSEA: 1
EYE PAIN: 0
VOMITING: 1
TROUBLE SWALLOWING: 0
CHEST TIGHTNESS: 0
CONSTIPATION: 0
EYE DISCHARGE: 0
SHORTNESS OF BREATH: 0
BLOOD IN STOOL: 0

## 2023-09-27 ASSESSMENT — LIFESTYLE VARIABLES
HOW OFTEN DO YOU HAVE A DRINK CONTAINING ALCOHOL: NEVER
HOW MANY STANDARD DRINKS CONTAINING ALCOHOL DO YOU HAVE ON A TYPICAL DAY: PATIENT DOES NOT DRINK

## 2023-09-27 ASSESSMENT — PAIN DESCRIPTION - LOCATION: LOCATION: ABDOMEN

## 2023-09-27 ASSESSMENT — PAIN - FUNCTIONAL ASSESSMENT: PAIN_FUNCTIONAL_ASSESSMENT: 0-10

## 2023-09-27 ASSESSMENT — PAIN DESCRIPTION - FREQUENCY: FREQUENCY: CONTINUOUS

## 2023-09-27 ASSESSMENT — PAIN DESCRIPTION - PAIN TYPE: TYPE: ACUTE PAIN

## 2023-09-28 VITALS
RESPIRATION RATE: 16 BRPM | DIASTOLIC BLOOD PRESSURE: 100 MMHG | HEART RATE: 75 BPM | SYSTOLIC BLOOD PRESSURE: 134 MMHG | BODY MASS INDEX: 30.8 KG/M2 | WEIGHT: 220 LBS | HEIGHT: 71 IN | OXYGEN SATURATION: 97 % | TEMPERATURE: 98.3 F

## 2023-09-28 LAB — B-OH-BUTYR SERPL-SCNC: 4.1 MG/DL (ref 0.2–2.8)

## 2023-09-28 NOTE — ED PROVIDER NOTES
Breath sounds: Normal breath sounds. No wheezing. Abdominal:      General: Bowel sounds are normal. There is no distension or abdominal bruit. There are no signs of injury. Palpations: Abdomen is soft. There is no shifting dullness, fluid wave, hepatomegaly, splenomegaly, mass or pulsatile mass. Tenderness: There is abdominal tenderness in the epigastric area. There is no right CVA tenderness, guarding or rebound. Negative signs include Saldana's sign, McBurney's sign and obturator sign. Comments: Patient good abdomen has no distention no guarding no rebound tenderness patient does have minimal epigastric tenderness on deep palpation has with no Saldana sign noted no McBurney's point tenderness no CVA tenderness noted   Musculoskeletal:         General: No tenderness. Normal range of motion. Cervical back: Neck supple. Skin:     General: Skin is warm. Coloration: Skin is not jaundiced, mottled or pale. Findings: No rash. Neurological:      Mental Status: He is alert and oriented to person, place, and time. Cranial Nerves: No cranial nerve deficit. Motor: No abnormal muscle tone. Psychiatric:         Behavior: Behavior normal.         Thought Content:  Thought content normal.         DIAGNOSTIC RESULTS     EKG: All EKG's are interpreted by the Emergency Department Physician who either signs or Co-signsthis chart in the absence of a cardiologist.        RADIOLOGY:   Hillary Jose Antonio such as CT, Ultrasound and MRI are read by the radiologist. Plain radiographic images are visualized and preliminarily interpreted by the emergency physician with the below findings:    terpretation per the Radiologist below, if available at the time ofthis note:    No orders to display         ED BEDSIDE ULTRASOUND:   Performed by ED Physician - none    LABS:  Labs Reviewed   URINALYSIS WITH REFLEX TO CULTURE - Abnormal; Notable for the following components:       Result Value    Glucose,

## 2023-10-26 ENCOUNTER — TELEPHONE (OUTPATIENT)
Dept: GASTROENTEROLOGY | Facility: CLINIC | Age: 48
End: 2023-10-26

## 2024-02-19 ENCOUNTER — APPOINTMENT (OUTPATIENT)
Dept: GENERAL RADIOLOGY | Age: 49
DRG: 422 | End: 2024-02-19
Payer: MEDICAID

## 2024-02-19 ENCOUNTER — APPOINTMENT (OUTPATIENT)
Dept: CT IMAGING | Age: 49
DRG: 422 | End: 2024-02-19
Payer: MEDICAID

## 2024-02-19 ENCOUNTER — HOSPITAL ENCOUNTER (INPATIENT)
Age: 49
LOS: 3 days | Discharge: HOME | DRG: 422 | End: 2024-02-22
Attending: EMERGENCY MEDICINE | Admitting: INTERNAL MEDICINE
Payer: MEDICAID

## 2024-02-19 DIAGNOSIS — E86.0 DEHYDRATION: ICD-10-CM

## 2024-02-19 DIAGNOSIS — N17.9 ACUTE KIDNEY INJURY (HCC): Primary | ICD-10-CM

## 2024-02-19 DIAGNOSIS — R10.13 ABDOMINAL PAIN, EPIGASTRIC: ICD-10-CM

## 2024-02-19 LAB
ALBUMIN SERPL-MCNC: 5.2 G/DL (ref 3.5–4.6)
ALP SERPL-CCNC: 143 U/L (ref 35–104)
ALT SERPL-CCNC: 47 U/L (ref 0–41)
AMORPH SED URNS QL MICRO: ABNORMAL
ANION GAP SERPL CALCULATED.3IONS-SCNC: 15 MEQ/L (ref 9–15)
ANION GAP SERPL CALCULATED.3IONS-SCNC: 17 MEQ/L (ref 9–15)
ANION GAP SERPL CALCULATED.3IONS-SCNC: 29 MEQ/L (ref 9–15)
AST SERPL-CCNC: 41 U/L (ref 0–40)
B-OH-BUTYR SERPL-SCNC: 11.4 MG/DL (ref 0.2–2.8)
BACTERIA URNS QL MICRO: NEGATIVE /HPF
BASOPHILS # BLD: 0 K/UL (ref 0–0.2)
BASOPHILS NFR BLD: 0.2 %
BILIRUB SERPL-MCNC: 1.2 MG/DL (ref 0.2–0.7)
BILIRUB UR QL STRIP: NEGATIVE
BUN SERPL-MCNC: 76 MG/DL (ref 6–20)
BUN SERPL-MCNC: 82 MG/DL (ref 6–20)
BUN SERPL-MCNC: 91 MG/DL (ref 6–20)
CALCIUM SERPL-MCNC: 10.1 MG/DL (ref 8.5–9.9)
CALCIUM SERPL-MCNC: 8.5 MG/DL (ref 8.5–9.9)
CALCIUM SERPL-MCNC: 8.7 MG/DL (ref 8.5–9.9)
CHLORIDE SERPL-SCNC: 103 MEQ/L (ref 95–107)
CHLORIDE SERPL-SCNC: 104 MEQ/L (ref 95–107)
CHLORIDE SERPL-SCNC: 93 MEQ/L (ref 95–107)
CLARITY UR: ABNORMAL
CO2 SERPL-SCNC: 13 MEQ/L (ref 20–31)
CO2 SERPL-SCNC: 19 MEQ/L (ref 20–31)
CO2 SERPL-SCNC: 19 MEQ/L (ref 20–31)
COLOR UR: YELLOW
CREAT SERPL-MCNC: 2.71 MG/DL (ref 0.7–1.2)
CREAT SERPL-MCNC: 2.97 MG/DL (ref 0.7–1.2)
CREAT SERPL-MCNC: 4.53 MG/DL (ref 0.7–1.2)
EOSINOPHIL # BLD: 0 K/UL (ref 0–0.7)
EOSINOPHIL NFR BLD: 0.1 %
EPI CELLS #/AREA URNS AUTO: ABNORMAL /HPF (ref 0–5)
ERYTHROCYTE [DISTWIDTH] IN BLOOD BY AUTOMATED COUNT: 13.2 % (ref 11.5–14.5)
ETHANOL PERCENT: NORMAL G/DL
ETHANOLAMINE SERPL-MCNC: <10 MG/DL (ref 0–0.08)
GLOBULIN SER CALC-MCNC: 3.1 G/DL (ref 2.3–3.5)
GLUCOSE BLD-MCNC: 121 MG/DL (ref 70–99)
GLUCOSE BLD-MCNC: 126 MG/DL (ref 70–99)
GLUCOSE BLD-MCNC: 143 MG/DL (ref 70–99)
GLUCOSE BLD-MCNC: 158 MG/DL (ref 70–99)
GLUCOSE BLD-MCNC: 178 MG/DL (ref 70–99)
GLUCOSE BLD-MCNC: 186 MG/DL (ref 70–99)
GLUCOSE BLD-MCNC: 267 MG/DL (ref 70–99)
GLUCOSE SERPL-MCNC: 112 MG/DL (ref 70–99)
GLUCOSE SERPL-MCNC: 181 MG/DL (ref 70–99)
GLUCOSE SERPL-MCNC: 316 MG/DL (ref 70–99)
GLUCOSE UR STRIP-MCNC: >=1000 MG/DL
HBA1C MFR BLD: 7.2 % (ref 4.8–5.9)
HCT VFR BLD AUTO: 45.2 % (ref 42–52)
HGB BLD-MCNC: 16.2 G/DL (ref 14–18)
HGB UR QL STRIP: ABNORMAL
KETONES UR STRIP-MCNC: 15 MG/DL
LEUKOCYTE ESTERASE UR QL STRIP: NEGATIVE
LIPASE SERPL-CCNC: 40 U/L (ref 12–95)
LYMPHOCYTES # BLD: 1.8 K/UL (ref 1–4.8)
LYMPHOCYTES NFR BLD: 11 %
MCH RBC QN AUTO: 28.4 PG (ref 27–31.3)
MCHC RBC AUTO-ENTMCNC: 35.8 % (ref 33–37)
MCV RBC AUTO: 79.3 FL (ref 79–92.2)
MONOCYTES # BLD: 1.1 K/UL (ref 0.2–0.8)
MONOCYTES NFR BLD: 6.8 %
NEUTROPHILS # BLD: 13.1 K/UL (ref 1.4–6.5)
NEUTS SEG NFR BLD: 81.5 %
NITRITE UR QL STRIP: NEGATIVE
PERFORMED ON: ABNORMAL
PH UR STRIP: 5 [PH] (ref 5–9)
PHOSPHATE SERPL-MCNC: 4.4 MG/DL (ref 2.3–4.8)
PHOSPHATE SERPL-MCNC: 4.5 MG/DL (ref 2.3–4.8)
PLATELET # BLD AUTO: 478 K/UL (ref 130–400)
POTASSIUM SERPL-SCNC: 3.8 MEQ/L (ref 3.4–4.9)
POTASSIUM SERPL-SCNC: 3.8 MEQ/L (ref 3.4–4.9)
POTASSIUM SERPL-SCNC: 4.3 MEQ/L (ref 3.4–4.9)
PROCALCITONIN SERPL IA-MCNC: 0.2 NG/ML (ref 0–0.15)
PROT SERPL-MCNC: 8.3 G/DL (ref 6.3–8)
PROT UR STRIP-MCNC: 100 MG/DL
RBC # BLD AUTO: 5.7 M/UL (ref 4.7–6.1)
RBC #/AREA URNS HPF: ABNORMAL /HPF (ref 0–2)
SODIUM SERPL-SCNC: 135 MEQ/L (ref 135–144)
SODIUM SERPL-SCNC: 138 MEQ/L (ref 135–144)
SODIUM SERPL-SCNC: 139 MEQ/L (ref 135–144)
SP GR UR STRIP: 1.02 (ref 1–1.03)
URINE REFLEX TO CULTURE: ABNORMAL
UROBILINOGEN UR STRIP-ACNC: 0.2 E.U./DL
WBC # BLD AUTO: 16.1 K/UL (ref 4.8–10.8)
WBC #/AREA URNS AUTO: ABNORMAL /HPF (ref 0–5)

## 2024-02-19 PROCEDURE — 84100 ASSAY OF PHOSPHORUS: CPT

## 2024-02-19 PROCEDURE — 1210000000 HC MED SURG R&B

## 2024-02-19 PROCEDURE — 6360000002 HC RX W HCPCS: Performed by: INTERNAL MEDICINE

## 2024-02-19 PROCEDURE — 83036 HEMOGLOBIN GLYCOSYLATED A1C: CPT

## 2024-02-19 PROCEDURE — 6370000000 HC RX 637 (ALT 250 FOR IP): Performed by: INTERNAL MEDICINE

## 2024-02-19 PROCEDURE — 74022 RADEX COMPL AQT ABD SERIES: CPT

## 2024-02-19 PROCEDURE — A4216 STERILE WATER/SALINE, 10 ML: HCPCS | Performed by: EMERGENCY MEDICINE

## 2024-02-19 PROCEDURE — 2580000003 HC RX 258: Performed by: INTERNAL MEDICINE

## 2024-02-19 PROCEDURE — 2500000003 HC RX 250 WO HCPCS: Performed by: EMERGENCY MEDICINE

## 2024-02-19 PROCEDURE — 36415 COLL VENOUS BLD VENIPUNCTURE: CPT

## 2024-02-19 PROCEDURE — 80053 COMPREHEN METABOLIC PANEL: CPT

## 2024-02-19 PROCEDURE — 96375 TX/PRO/DX INJ NEW DRUG ADDON: CPT

## 2024-02-19 PROCEDURE — 74176 CT ABD & PELVIS W/O CONTRAST: CPT

## 2024-02-19 PROCEDURE — 99222 1ST HOSP IP/OBS MODERATE 55: CPT | Performed by: PHYSICIAN ASSISTANT

## 2024-02-19 PROCEDURE — 84145 PROCALCITONIN (PCT): CPT

## 2024-02-19 PROCEDURE — 82010 KETONE BODYS QUAN: CPT

## 2024-02-19 PROCEDURE — 85025 COMPLETE CBC W/AUTO DIFF WBC: CPT

## 2024-02-19 PROCEDURE — 96374 THER/PROPH/DIAG INJ IV PUSH: CPT

## 2024-02-19 PROCEDURE — 82077 ASSAY SPEC XCP UR&BREATH IA: CPT

## 2024-02-19 PROCEDURE — 83690 ASSAY OF LIPASE: CPT

## 2024-02-19 PROCEDURE — 81001 URINALYSIS AUTO W/SCOPE: CPT

## 2024-02-19 PROCEDURE — 2580000003 HC RX 258: Performed by: EMERGENCY MEDICINE

## 2024-02-19 PROCEDURE — 99285 EMERGENCY DEPT VISIT HI MDM: CPT

## 2024-02-19 PROCEDURE — 96361 HYDRATE IV INFUSION ADD-ON: CPT

## 2024-02-19 PROCEDURE — 6360000002 HC RX W HCPCS: Performed by: EMERGENCY MEDICINE

## 2024-02-19 RX ORDER — INSULIN LISPRO 100 [IU]/ML
0-4 INJECTION, SOLUTION INTRAVENOUS; SUBCUTANEOUS NIGHTLY
Status: DISCONTINUED | OUTPATIENT
Start: 2024-02-19 | End: 2024-02-19

## 2024-02-19 RX ORDER — SODIUM CHLORIDE 9 MG/ML
INJECTION, SOLUTION INTRAVENOUS PRN
Status: DISCONTINUED | OUTPATIENT
Start: 2024-02-19 | End: 2024-02-22 | Stop reason: HOSPADM

## 2024-02-19 RX ORDER — ONDANSETRON 2 MG/ML
4 INJECTION INTRAMUSCULAR; INTRAVENOUS ONCE
Status: COMPLETED | OUTPATIENT
Start: 2024-02-19 | End: 2024-02-19

## 2024-02-19 RX ORDER — POTASSIUM CHLORIDE 20 MEQ/1
40 TABLET, EXTENDED RELEASE ORAL ONCE
Status: COMPLETED | OUTPATIENT
Start: 2024-02-19 | End: 2024-02-19

## 2024-02-19 RX ORDER — POTASSIUM CHLORIDE 7.45 MG/ML
10 INJECTION INTRAVENOUS PRN
Status: DISCONTINUED | OUTPATIENT
Start: 2024-02-19 | End: 2024-02-22 | Stop reason: HOSPADM

## 2024-02-19 RX ORDER — SODIUM CHLORIDE 9 MG/ML
INJECTION, SOLUTION INTRAVENOUS CONTINUOUS
Status: DISCONTINUED | OUTPATIENT
Start: 2024-02-19 | End: 2024-02-19

## 2024-02-19 RX ORDER — MAGNESIUM SULFATE IN WATER 40 MG/ML
2000 INJECTION, SOLUTION INTRAVENOUS PRN
Status: DISCONTINUED | OUTPATIENT
Start: 2024-02-19 | End: 2024-02-22 | Stop reason: HOSPADM

## 2024-02-19 RX ORDER — HYDROMORPHONE HYDROCHLORIDE 1 MG/ML
1 INJECTION, SOLUTION INTRAMUSCULAR; INTRAVENOUS; SUBCUTANEOUS ONCE
Status: COMPLETED | OUTPATIENT
Start: 2024-02-19 | End: 2024-02-19

## 2024-02-19 RX ORDER — 0.9 % SODIUM CHLORIDE 0.9 %
1000 INTRAVENOUS SOLUTION INTRAVENOUS ONCE
Status: COMPLETED | OUTPATIENT
Start: 2024-02-19 | End: 2024-02-19

## 2024-02-19 RX ORDER — INSULIN LISPRO 100 [IU]/ML
0-8 INJECTION, SOLUTION INTRAVENOUS; SUBCUTANEOUS
Status: DISCONTINUED | OUTPATIENT
Start: 2024-02-19 | End: 2024-02-19

## 2024-02-19 RX ORDER — ACETAMINOPHEN 650 MG/1
650 SUPPOSITORY RECTAL EVERY 6 HOURS PRN
Status: DISCONTINUED | OUTPATIENT
Start: 2024-02-19 | End: 2024-02-22 | Stop reason: HOSPADM

## 2024-02-19 RX ORDER — MORPHINE SULFATE 4 MG/ML
4 INJECTION, SOLUTION INTRAMUSCULAR; INTRAVENOUS ONCE
Status: COMPLETED | OUTPATIENT
Start: 2024-02-19 | End: 2024-02-19

## 2024-02-19 RX ORDER — DICYCLOMINE HCL 20 MG
20 TABLET ORAL 4 TIMES DAILY PRN
Status: DISCONTINUED | OUTPATIENT
Start: 2024-02-19 | End: 2024-02-20

## 2024-02-19 RX ORDER — SODIUM CHLORIDE 0.9 % (FLUSH) 0.9 %
5-40 SYRINGE (ML) INJECTION PRN
Status: DISCONTINUED | OUTPATIENT
Start: 2024-02-19 | End: 2024-02-22 | Stop reason: HOSPADM

## 2024-02-19 RX ORDER — ONDANSETRON 4 MG/1
4 TABLET, ORALLY DISINTEGRATING ORAL EVERY 8 HOURS PRN
Status: DISCONTINUED | OUTPATIENT
Start: 2024-02-19 | End: 2024-02-22 | Stop reason: HOSPADM

## 2024-02-19 RX ORDER — ACETAMINOPHEN 325 MG/1
650 TABLET ORAL EVERY 4 HOURS PRN
Status: DISCONTINUED | OUTPATIENT
Start: 2024-02-19 | End: 2024-02-22 | Stop reason: HOSPADM

## 2024-02-19 RX ORDER — ONDANSETRON 2 MG/ML
4 INJECTION INTRAMUSCULAR; INTRAVENOUS EVERY 6 HOURS PRN
Status: DISCONTINUED | OUTPATIENT
Start: 2024-02-19 | End: 2024-02-22 | Stop reason: HOSPADM

## 2024-02-19 RX ORDER — INSULIN LISPRO 100 [IU]/ML
0-8 INJECTION, SOLUTION INTRAVENOUS; SUBCUTANEOUS
Status: DISCONTINUED | OUTPATIENT
Start: 2024-02-19 | End: 2024-02-22

## 2024-02-19 RX ORDER — DEXTROSE MONOHYDRATE 100 MG/ML
INJECTION, SOLUTION INTRAVENOUS CONTINUOUS PRN
Status: DISCONTINUED | OUTPATIENT
Start: 2024-02-19 | End: 2024-02-22 | Stop reason: HOSPADM

## 2024-02-19 RX ORDER — LORAZEPAM 2 MG/ML
1 INJECTION INTRAMUSCULAR NIGHTLY
Status: COMPLETED | OUTPATIENT
Start: 2024-02-19 | End: 2024-02-19

## 2024-02-19 RX ORDER — LORAZEPAM 2 MG/ML
2 INJECTION INTRAMUSCULAR ONCE
Status: COMPLETED | OUTPATIENT
Start: 2024-02-19 | End: 2024-02-19

## 2024-02-19 RX ORDER — INSULIN GLARGINE 100 [IU]/ML
10 INJECTION, SOLUTION SUBCUTANEOUS NIGHTLY
Status: DISCONTINUED | OUTPATIENT
Start: 2024-02-19 | End: 2024-02-19

## 2024-02-19 RX ORDER — SODIUM CHLORIDE 9 MG/ML
INJECTION, SOLUTION INTRAVENOUS CONTINUOUS
Status: DISPENSED | OUTPATIENT
Start: 2024-02-19 | End: 2024-02-20

## 2024-02-19 RX ORDER — DEXTROSE AND SODIUM CHLORIDE 5; .45 G/100ML; G/100ML
INJECTION, SOLUTION INTRAVENOUS CONTINUOUS PRN
Status: DISCONTINUED | OUTPATIENT
Start: 2024-02-19 | End: 2024-02-22 | Stop reason: HOSPADM

## 2024-02-19 RX ORDER — ACETAMINOPHEN 325 MG/1
650 TABLET ORAL EVERY 6 HOURS PRN
Status: DISCONTINUED | OUTPATIENT
Start: 2024-02-19 | End: 2024-02-19 | Stop reason: SDUPTHER

## 2024-02-19 RX ORDER — INSULIN GLARGINE 100 [IU]/ML
10 INJECTION, SOLUTION SUBCUTANEOUS DAILY
Status: DISCONTINUED | OUTPATIENT
Start: 2024-02-19 | End: 2024-02-20

## 2024-02-19 RX ORDER — TRAMADOL HYDROCHLORIDE 50 MG/1
50 TABLET ORAL EVERY 8 HOURS PRN
Status: DISCONTINUED | OUTPATIENT
Start: 2024-02-19 | End: 2024-02-22 | Stop reason: HOSPADM

## 2024-02-19 RX ORDER — INSULIN LISPRO 100 [IU]/ML
0-4 INJECTION, SOLUTION INTRAVENOUS; SUBCUTANEOUS NIGHTLY
Status: DISCONTINUED | OUTPATIENT
Start: 2024-02-19 | End: 2024-02-22

## 2024-02-19 RX ORDER — LORAZEPAM 2 MG/ML
0.5 INJECTION INTRAMUSCULAR ONCE
Status: COMPLETED | OUTPATIENT
Start: 2024-02-19 | End: 2024-02-19

## 2024-02-19 RX ORDER — ENOXAPARIN SODIUM 100 MG/ML
30 INJECTION SUBCUTANEOUS DAILY
Status: DISCONTINUED | OUTPATIENT
Start: 2024-02-19 | End: 2024-02-20

## 2024-02-19 RX ORDER — CYCLOBENZAPRINE HCL 10 MG
10 TABLET ORAL 3 TIMES DAILY PRN
Status: DISCONTINUED | OUTPATIENT
Start: 2024-02-19 | End: 2024-02-22 | Stop reason: HOSPADM

## 2024-02-19 RX ORDER — GLUCAGON 1 MG/ML
1 KIT INJECTION PRN
Status: DISCONTINUED | OUTPATIENT
Start: 2024-02-19 | End: 2024-02-22 | Stop reason: HOSPADM

## 2024-02-19 RX ORDER — SODIUM CHLORIDE 0.9 % (FLUSH) 0.9 %
5-40 SYRINGE (ML) INJECTION EVERY 12 HOURS SCHEDULED
Status: DISCONTINUED | OUTPATIENT
Start: 2024-02-19 | End: 2024-02-22 | Stop reason: HOSPADM

## 2024-02-19 RX ADMIN — Medication 10 ML: at 21:44

## 2024-02-19 RX ADMIN — INSULIN LISPRO 4 UNITS: 100 INJECTION, SOLUTION INTRAVENOUS; SUBCUTANEOUS at 08:17

## 2024-02-19 RX ADMIN — INSULIN GLARGINE 10 UNITS: 100 INJECTION, SOLUTION SUBCUTANEOUS at 16:55

## 2024-02-19 RX ADMIN — MORPHINE SULFATE 4 MG: 4 INJECTION, SOLUTION INTRAMUSCULAR; INTRAVENOUS at 03:27

## 2024-02-19 RX ADMIN — Medication 2 MG: at 03:26

## 2024-02-19 RX ADMIN — SODIUM CHLORIDE 1.18 UNITS/HR: 9 INJECTION, SOLUTION INTRAVENOUS at 12:25

## 2024-02-19 RX ADMIN — ACETAMINOPHEN 325MG 650 MG: 325 TABLET ORAL at 09:20

## 2024-02-19 RX ADMIN — CYCLOBENZAPRINE 10 MG: 10 TABLET, FILM COATED ORAL at 15:16

## 2024-02-19 RX ADMIN — DICYCLOMINE HYDROCHLORIDE 20 MG: 20 TABLET ORAL at 09:20

## 2024-02-19 RX ADMIN — SODIUM CHLORIDE 1000 ML: 9 INJECTION, SOLUTION INTRAVENOUS at 03:26

## 2024-02-19 RX ADMIN — SODIUM CHLORIDE: 9 INJECTION, SOLUTION INTRAVENOUS at 15:10

## 2024-02-19 RX ADMIN — HYDROMORPHONE HYDROCHLORIDE 1 MG: 1 INJECTION, SOLUTION INTRAMUSCULAR; INTRAVENOUS; SUBCUTANEOUS at 05:01

## 2024-02-19 RX ADMIN — POTASSIUM CHLORIDE 40 MEQ: 1500 TABLET, EXTENDED RELEASE ORAL at 06:35

## 2024-02-19 RX ADMIN — TRAMADOL HYDROCHLORIDE 50 MG: 50 TABLET ORAL at 15:16

## 2024-02-19 RX ADMIN — SODIUM CHLORIDE: 9 INJECTION, SOLUTION INTRAVENOUS at 06:35

## 2024-02-19 RX ADMIN — ENOXAPARIN SODIUM 30 MG: 100 INJECTION SUBCUTANEOUS at 08:16

## 2024-02-19 RX ADMIN — SODIUM CHLORIDE 1000 ML: 900 INJECTION, SOLUTION INTRAVENOUS at 12:59

## 2024-02-19 RX ADMIN — Medication 0.5 MG: at 15:11

## 2024-02-19 RX ADMIN — ONDANSETRON 4 MG: 2 INJECTION INTRAMUSCULAR; INTRAVENOUS at 03:26

## 2024-02-19 RX ADMIN — FAMOTIDINE 20 MG: 10 INJECTION, SOLUTION INTRAVENOUS at 05:58

## 2024-02-19 RX ADMIN — Medication 1 MG: at 21:44

## 2024-02-19 RX ADMIN — SODIUM CHLORIDE: 9 INJECTION, SOLUTION INTRAVENOUS at 12:19

## 2024-02-19 RX ADMIN — Medication 0.5 MG: at 09:20

## 2024-02-19 RX ADMIN — DICYCLOMINE HYDROCHLORIDE 20 MG: 20 TABLET ORAL at 15:16

## 2024-02-19 ASSESSMENT — ENCOUNTER SYMPTOMS
CHEST TIGHTNESS: 0
WHEEZING: 0
EYE DISCHARGE: 0
RHINORRHEA: 0
SINUS PRESSURE: 0
NAUSEA: 0
VOMITING: 0
PHOTOPHOBIA: 0
SORE THROAT: 0
ABDOMINAL PAIN: 1
BACK PAIN: 0
COUGH: 0
DIARRHEA: 0
SHORTNESS OF BREATH: 0
ABDOMINAL DISTENTION: 0

## 2024-02-19 ASSESSMENT — PAIN DESCRIPTION - LOCATION
LOCATION: OTHER (COMMENT)
LOCATION: ABDOMEN

## 2024-02-19 ASSESSMENT — PAIN SCALES - GENERAL
PAINLEVEL_OUTOF10: 0
PAINLEVEL_OUTOF10: 6
PAINLEVEL_OUTOF10: 5
PAINLEVEL_OUTOF10: 0
PAINLEVEL_OUTOF10: 0
PAINLEVEL_OUTOF10: 5
PAINLEVEL_OUTOF10: 0
PAINLEVEL_OUTOF10: 10

## 2024-02-19 ASSESSMENT — PAIN - FUNCTIONAL ASSESSMENT: PAIN_FUNCTIONAL_ASSESSMENT: 0-10

## 2024-02-19 NOTE — PLAN OF CARE
Nutrition Problem #1: Inadequate oral intake  Intervention: Food and/or Nutrient Delivery: Continue Current Diet, Modify Oral Nutrition Supplement  Nutritional  Goals: PO intake >50%, GI symptoms to subside, improved renal labs

## 2024-02-19 NOTE — H&P
DEPARTMENT OF HOSPITAL MEDICINE    HISTORY AND PHYSICAL EXAM    PATIENT NAME:  Sang Escalona    MRN:  [id number]  SERVICE DATE:  2/19/2024   SERVICE TIME:  5:45 AM    Primary Care Physician: Hemant Torres [facility]     SUBJECTIVE  CHIEF COMPLAINT:  N/V, Abdominal pain    HPI:  Sang Escalona is a 49 y.o. male who presents with above complaints.    Patient is a 49-year-old male presenting to the ED with complaints of abdominal pain, nausea/vomiting, decreased p.o. intake for the past several days.  He attributes this immediately to having used some marijuana a few days ago even though he has known history of cannabinoid hyperemesis syndrome.  He is found to have a fairly severe JANETH, creatinine 4.53 up from normal range previously.  BUN also severely elevated >90.  BUN:Creatinine ratio at time of presentation to ED ~20:1.  He complained of ~1 episode of vomiting/day for the past few days.  He additionally has history of alcoholic liver disease with transaminitis and prior episodes of alcoholic pancreatitis.  He is found to have mild transaminase elevation but no lipase elevation today, though his abdominal pain is at the epigastric position.  Anion gap was found to be elevated to 29.  Patient was administered 2 L IV fluid in the ED, and hospitalist service was consulted for admission.  UA and beta hydroxybutyrate were not yet completed in the ED at time of call for admission.  Patient is a full code.    On examination, patient reports he has been taking hot showers at home until he runs out of water, which did help for a while but seem to have diminishing efficacy over time.  He still has erythema from a borderline first-degree scald of his back from standing in the hot shower so much.  He states that the last time he had marijuana was on his [birth year] on [birth year] and that he had onset of the nausea/vomiting approximately a week after that.  That seems to be a significant delay compared to typical

## 2024-02-19 NOTE — ACP (ADVANCE CARE PLANNING)
Advance Care Planning   Healthcare Decision Maker:    Primary Decision Maker: Dinorah Escalona - Brother/Sister - [phone]    Secondary Decision Maker: Caridad Escalona - Brother/Sister - [phone]    Click here to complete Healthcare Decision Makers including selection of the Healthcare Decision Maker Relationship (ie \"Primary\").

## 2024-02-19 NOTE — ED PROVIDER NOTES
pain.   Skin:  Negative for pallor and rash.   Allergic/Immunologic: Negative for immunocompromised state.   Neurological:  Negative for dizziness and syncope.   Hematological:  Negative for adenopathy.   Psychiatric/Behavioral:  Negative for agitation and hallucinations.    All other systems reviewed and are negative.      Except as noted above the remainder of the review of systems was reviewed and negative.       PAST MEDICAL HISTORY     Past Medical History:   Diagnosis Date    Depression     Diabetes mellitus (HCC)     ETOH abuse     Sleep apnea          SURGICALHISTORY     History reviewed. No pertinent surgical history.      CURRENT MEDICATIONS       Previous Medications    ALCOHOL SWABS 70 % PADS    1 box by Does not apply route daily    BLOOD GLUCOSE MONITORING SUPPL (ACCU-CHEK COMPACT CARE KIT) KIT    1 kit by Does not apply route daily    BLOOD GLUCOSE MONITORING SUPPL (ONETOUCH VERIO) W/DEVICE KIT    As directed    BUSPIRONE (BUSPAR) 15 MG TABLET    Take 15 mg by mouth 3 times daily    HYDROXYZINE PAMOATE (VISTARIL) 50 MG CAPSULE    Take 1 capsule by mouth every 8 hours as needed    INSULIN LISPRO (HUMALOG) 100 UNIT/ML SOLN INJECTION VIAL    Inject 0-4 Units into the skin 4 times daily (before meals and nightly)    INSULIN PEN NEEDLE (PEN NEEDLES) 33G X 4 MM MISC    Use 1 daily with insulin    LANTUS SOLOSTAR 100 UNIT/ML INJECTION PEN    Inject 20 Units into the skin nightly    METFORMIN (GLUCOPHAGE) 500 MG TABLET    Take 1 tablet by mouth 2 times daily (with meals)    MIRTAZAPINE (REMERON) 15 MG TABLET    Take 1 tablet by mouth nightly at bedtime.    ONDANSETRON (ZOFRAN) 4 MG TABLET    Take 1 tablet by mouth every 8 hours as needed for Nausea    ONETOUCH DELICA LANCETS 33G MISC    bid    PAROXETINE (PAXIL) 10 MG TABLET    Take 1 tablet by mouth daily    PSEUDOEPHEDRINE-GUAIFENESIN (MUCINEX D MAX STRENGTH) 120-1200 MG TB12    Take 1 tablet by mouth in the morning and at bedtime    QUETIAPINE (SEROQUEL)

## 2024-02-19 NOTE — FLOWSHEET NOTE
Am nursing  assessment completed.    Pt :    awake and resting in bed           Alert and oriented.      Diet: declines  RESP:  even and non labored             Lung sounds:   clear       Oxygen: room air   Complaints of:    abdominal discomfort and cramping                     Pain:  IV:   ml/hr            patent/ flushed/ capped, no signs of infiltration noted, dressing clean/dry/intact.  TELE: sr                Dressings:                           Precautions:              Falls:  15      Juan Ramon:20   Chart and meds reviewed.           Noted Labs: na 135 k 3.8 bun 91 cr4.53 w 16.1 h 16.2   Plan for today:    Bed wheels locked and in lowest position. Call light and bedside table within reach.   NOTES:  020 prn tylenol and bentyl for complaints of pain. Restless at times. Electronically signed by Sarah Alexander RN on 2/19/2024 at 10:17 AM    1225 insulin per order.    1516 prn bentyl and ultram per request and continued abd discomfort.   1545 resting with no complaints.   1725 Endocrine by for rounds.   1900  Report given to RN, handoff of care complete at bedside.  Electronically signed by Sarah Alexander RN

## 2024-02-20 LAB
ANION GAP SERPL CALCULATED.3IONS-SCNC: 14 MEQ/L (ref 9–15)
BUN SERPL-MCNC: 55 MG/DL (ref 6–20)
CALCIUM SERPL-MCNC: 8.4 MG/DL (ref 8.5–9.9)
CHLORIDE SERPL-SCNC: 107 MEQ/L (ref 95–107)
CO2 SERPL-SCNC: 20 MEQ/L (ref 20–31)
CREAT SERPL-MCNC: 1.61 MG/DL (ref 0.7–1.2)
GLUCOSE BLD-MCNC: 101 MG/DL (ref 70–99)
GLUCOSE BLD-MCNC: 120 MG/DL (ref 70–99)
GLUCOSE BLD-MCNC: 202 MG/DL (ref 70–99)
GLUCOSE BLD-MCNC: 248 MG/DL (ref 70–99)
GLUCOSE SERPL-MCNC: 164 MG/DL (ref 70–99)
MAGNESIUM SERPL-MCNC: 2.1 MG/DL (ref 1.7–2.4)
PERFORMED ON: ABNORMAL
PHOSPHATE SERPL-MCNC: 2.6 MG/DL (ref 2.3–4.8)
POTASSIUM SERPL-SCNC: 3.5 MEQ/L (ref 3.4–4.9)
SODIUM SERPL-SCNC: 141 MEQ/L (ref 135–144)

## 2024-02-20 PROCEDURE — 1210000000 HC MED SURG R&B

## 2024-02-20 PROCEDURE — C9113 INJ PANTOPRAZOLE SODIUM, VIA: HCPCS | Performed by: INTERNAL MEDICINE

## 2024-02-20 PROCEDURE — 2500000003 HC RX 250 WO HCPCS: Performed by: INTERNAL MEDICINE

## 2024-02-20 PROCEDURE — 83735 ASSAY OF MAGNESIUM: CPT

## 2024-02-20 PROCEDURE — 6360000002 HC RX W HCPCS: Performed by: INTERNAL MEDICINE

## 2024-02-20 PROCEDURE — 2580000003 HC RX 258: Performed by: INTERNAL MEDICINE

## 2024-02-20 PROCEDURE — 80048 BASIC METABOLIC PNL TOTAL CA: CPT

## 2024-02-20 PROCEDURE — 36415 COLL VENOUS BLD VENIPUNCTURE: CPT

## 2024-02-20 PROCEDURE — 99232 SBSQ HOSP IP/OBS MODERATE 35: CPT | Performed by: INTERNAL MEDICINE

## 2024-02-20 PROCEDURE — A4216 STERILE WATER/SALINE, 10 ML: HCPCS | Performed by: INTERNAL MEDICINE

## 2024-02-20 PROCEDURE — 6370000000 HC RX 637 (ALT 250 FOR IP): Performed by: INTERNAL MEDICINE

## 2024-02-20 PROCEDURE — 84100 ASSAY OF PHOSPHORUS: CPT

## 2024-02-20 RX ORDER — QUETIAPINE FUMARATE 50 MG/1
25 TABLET, FILM COATED ORAL 2 TIMES DAILY PRN
Status: DISCONTINUED | OUTPATIENT
Start: 2024-02-20 | End: 2024-02-22 | Stop reason: HOSPADM

## 2024-02-20 RX ORDER — LORAZEPAM 2 MG/ML
0.5 INJECTION INTRAMUSCULAR EVERY 8 HOURS
Status: COMPLETED | OUTPATIENT
Start: 2024-02-20 | End: 2024-02-20

## 2024-02-20 RX ORDER — INSULIN GLARGINE 100 [IU]/ML
8 INJECTION, SOLUTION SUBCUTANEOUS 2 TIMES DAILY
Status: DISCONTINUED | OUTPATIENT
Start: 2024-02-21 | End: 2024-02-22

## 2024-02-20 RX ORDER — DICYCLOMINE HYDROCHLORIDE 10 MG/ML
20 INJECTION INTRAMUSCULAR 4 TIMES DAILY
Status: DISCONTINUED | OUTPATIENT
Start: 2024-02-20 | End: 2024-02-22 | Stop reason: HOSPADM

## 2024-02-20 RX ORDER — BUSPIRONE HYDROCHLORIDE 10 MG/1
20 TABLET ORAL 3 TIMES DAILY
Status: DISCONTINUED | OUTPATIENT
Start: 2024-02-20 | End: 2024-02-22 | Stop reason: HOSPADM

## 2024-02-20 RX ORDER — METOCLOPRAMIDE HYDROCHLORIDE 5 MG/ML
10 INJECTION INTRAMUSCULAR; INTRAVENOUS EVERY 8 HOURS
Status: COMPLETED | OUTPATIENT
Start: 2024-02-20 | End: 2024-02-21

## 2024-02-20 RX ORDER — ENOXAPARIN SODIUM 100 MG/ML
30 INJECTION SUBCUTANEOUS 2 TIMES DAILY
Status: DISCONTINUED | OUTPATIENT
Start: 2024-02-20 | End: 2024-02-22 | Stop reason: HOSPADM

## 2024-02-20 RX ORDER — MIRTAZAPINE 15 MG/1
15 TABLET, FILM COATED ORAL NIGHTLY
Status: DISCONTINUED | OUTPATIENT
Start: 2024-02-20 | End: 2024-02-22 | Stop reason: HOSPADM

## 2024-02-20 RX ORDER — LORAZEPAM 2 MG/ML
1 INJECTION INTRAMUSCULAR NIGHTLY
Status: COMPLETED | OUTPATIENT
Start: 2024-02-20 | End: 2024-02-20

## 2024-02-20 RX ORDER — QUETIAPINE FUMARATE 50 MG/1
25 TABLET, FILM COATED ORAL 2 TIMES DAILY
Status: DISCONTINUED | OUTPATIENT
Start: 2024-02-20 | End: 2024-02-20

## 2024-02-20 RX ADMIN — Medication 5 ML: at 08:22

## 2024-02-20 RX ADMIN — INSULIN LISPRO 2 UNITS: 100 INJECTION, SOLUTION INTRAVENOUS; SUBCUTANEOUS at 08:12

## 2024-02-20 RX ADMIN — BUSPIRONE HYDROCHLORIDE 20 MG: 10 TABLET ORAL at 14:20

## 2024-02-20 RX ADMIN — CYCLOBENZAPRINE 10 MG: 10 TABLET, FILM COATED ORAL at 14:20

## 2024-02-20 RX ADMIN — METOCLOPRAMIDE HYDROCHLORIDE 10 MG: 5 INJECTION INTRAMUSCULAR; INTRAVENOUS at 20:44

## 2024-02-20 RX ADMIN — TRAMADOL HYDROCHLORIDE 50 MG: 50 TABLET ORAL at 23:39

## 2024-02-20 RX ADMIN — TRAMADOL HYDROCHLORIDE 50 MG: 50 TABLET ORAL at 14:20

## 2024-02-20 RX ADMIN — BUSPIRONE HYDROCHLORIDE 20 MG: 10 TABLET ORAL at 09:19

## 2024-02-20 RX ADMIN — FAMOTIDINE 20 MG: 10 INJECTION, SOLUTION INTRAVENOUS at 20:44

## 2024-02-20 RX ADMIN — METOCLOPRAMIDE HYDROCHLORIDE 10 MG: 5 INJECTION INTRAMUSCULAR; INTRAVENOUS at 14:08

## 2024-02-20 RX ADMIN — SODIUM CHLORIDE: 9 INJECTION, SOLUTION INTRAVENOUS at 05:30

## 2024-02-20 RX ADMIN — INSULIN LISPRO 2 UNITS: 100 INJECTION, SOLUTION INTRAVENOUS; SUBCUTANEOUS at 17:23

## 2024-02-20 RX ADMIN — POTASSIUM BICARBONATE 25 MEQ: 978 TABLET, EFFERVESCENT ORAL at 09:19

## 2024-02-20 RX ADMIN — Medication 1 MG: at 20:44

## 2024-02-20 RX ADMIN — FAMOTIDINE 20 MG: 10 INJECTION, SOLUTION INTRAVENOUS at 09:34

## 2024-02-20 RX ADMIN — ENOXAPARIN SODIUM 30 MG: 100 INJECTION SUBCUTANEOUS at 08:12

## 2024-02-20 RX ADMIN — INSULIN GLARGINE 10 UNITS: 100 INJECTION, SOLUTION SUBCUTANEOUS at 08:12

## 2024-02-20 RX ADMIN — PANTOPRAZOLE SODIUM 40 MG: 40 INJECTION, POWDER, FOR SOLUTION INTRAVENOUS at 14:09

## 2024-02-20 RX ADMIN — MIRTAZAPINE 15 MG: 15 TABLET, FILM COATED ORAL at 20:44

## 2024-02-20 RX ADMIN — ALUMINUM HYDROXIDE, MAGNESIUM HYDROXIDE, AND SIMETHICONE: 200; 200; 20 SUSPENSION ORAL at 20:42

## 2024-02-20 RX ADMIN — DICYCLOMINE HYDROCHLORIDE 20 MG: 10 INJECTION, SOLUTION INTRAMUSCULAR at 14:08

## 2024-02-20 RX ADMIN — Medication 0.5 MG: at 09:57

## 2024-02-20 RX ADMIN — Medication 0.5 MG: at 17:55

## 2024-02-20 RX ADMIN — ALUMINUM HYDROXIDE, MAGNESIUM HYDROXIDE, AND SIMETHICONE: 200; 200; 20 SUSPENSION ORAL at 17:23

## 2024-02-20 RX ADMIN — QUETIAPINE FUMARATE 25 MG: 50 TABLET ORAL at 09:18

## 2024-02-20 RX ADMIN — TRAMADOL HYDROCHLORIDE 50 MG: 50 TABLET ORAL at 08:12

## 2024-02-20 RX ADMIN — DICYCLOMINE HYDROCHLORIDE 20 MG: 10 INJECTION, SOLUTION INTRAMUSCULAR at 20:44

## 2024-02-20 RX ADMIN — DICYCLOMINE HYDROCHLORIDE 20 MG: 20 TABLET ORAL at 08:11

## 2024-02-20 RX ADMIN — BUSPIRONE HYDROCHLORIDE 20 MG: 10 TABLET ORAL at 20:44

## 2024-02-20 ASSESSMENT — PAIN DESCRIPTION - FREQUENCY
FREQUENCY: CONTINUOUS

## 2024-02-20 ASSESSMENT — PAIN DESCRIPTION - LOCATION
LOCATION: ABDOMEN

## 2024-02-20 ASSESSMENT — PAIN DESCRIPTION - DESCRIPTORS
DESCRIPTORS: CRUSHING
DESCRIPTORS: THROBBING
DESCRIPTORS: DISCOMFORT
DESCRIPTORS: THROBBING

## 2024-02-20 ASSESSMENT — PAIN DESCRIPTION - ONSET
ONSET: ON-GOING

## 2024-02-20 ASSESSMENT — PAIN DESCRIPTION - ORIENTATION
ORIENTATION: MID
ORIENTATION: RIGHT;LEFT
ORIENTATION: MID
ORIENTATION: RIGHT;LEFT

## 2024-02-20 ASSESSMENT — PAIN DESCRIPTION - DIRECTION
RADIATING_TOWARDS: ABD
RADIATING_TOWARDS: ABD

## 2024-02-20 ASSESSMENT — PAIN - FUNCTIONAL ASSESSMENT
PAIN_FUNCTIONAL_ASSESSMENT: ACTIVITIES ARE NOT PREVENTED

## 2024-02-20 ASSESSMENT — PAIN DESCRIPTION - PAIN TYPE
TYPE: ACUTE PAIN

## 2024-02-20 ASSESSMENT — PAIN SCALES - GENERAL
PAINLEVEL_OUTOF10: 6
PAINLEVEL_OUTOF10: 8
PAINLEVEL_OUTOF10: 4
PAINLEVEL_OUTOF10: 10
PAINLEVEL_OUTOF10: 5

## 2024-02-20 ASSESSMENT — ENCOUNTER SYMPTOMS: ABDOMINAL PAIN: 1

## 2024-02-20 NOTE — CONSULTS
Endocrinology Consult      Sang Escalona  1975  [id number]    Date of Admission:  2/19/2024  3:14 AM  Date of Consultation:2/19/2024    Consultant:Sim Menendez PA-C  Supervising Physician: Lj Lopez MD  PCP:  [facility]       Reason for Consultation: Diabetes    C/C:   Chief Complaint   Patient presents with    Abdominal Pain       Assessment-  Type 2 diabetes with hyperglycemia  Cannabis hyperemesis  DKA-resolved  JANETH  Abdominal pain    Plan-  Lantus 10 units injected daily  Humalog medium dose sliding scale coverage  Monitor glycemic control closely  Diabetic education ordered  Possible discharge home on insulin      Abdominal Pain  Pertinent negatives include no arthralgias, diarrhea, fever, headaches, nausea or vomiting.     History of Present Illness: This patient is a 49-year-old type II diabetic male admitted following 3 to 4 days of nausea and vomiting.  Diagnosed with DKA and started on insulin drip with IV fluids.  Acidosis resolved quickly and he was transitioned to basal and bolus insulin dosing.  He has a history of cannabis hyperemesis, used marijuana and had subsequent emesis with inability to eat or drink anything.  Has a history of alcoholism, patient denies a history of pancreatitis however there is a reported episode of alcoholic pancreatitis and transaminitis previously.  Patient states he was taking metformin, glipizide, and some diabetic medication that starts with the letter a that he cannot remember.  Diabetic education has been ordered, for insulin training, the patient will likely need to go home on insulin due to the necessitation of discontinuing oral antihyperglycemic's due to his acute kidney injury.     Allergies: No Known Allergies    PMH: Patient has a past medical history of Depression, Diabetes mellitus (HCC), ETOH abuse, and Sleep apnea.    PSH: Patient has no past surgical history on file.    Social History: Patient reports that he has been smoking

## 2024-02-21 LAB
ANION GAP SERPL CALCULATED.3IONS-SCNC: 13 MEQ/L (ref 9–15)
BASOPHILS # BLD: 0 K/UL (ref 0–0.2)
BASOPHILS NFR BLD: 0.5 %
BUN SERPL-MCNC: 30 MG/DL (ref 6–20)
CALCIUM SERPL-MCNC: 9 MG/DL (ref 8.5–9.9)
CHLORIDE SERPL-SCNC: 105 MEQ/L (ref 95–107)
CO2 SERPL-SCNC: 21 MEQ/L (ref 20–31)
CREAT SERPL-MCNC: 1.15 MG/DL (ref 0.7–1.2)
EOSINOPHIL # BLD: 0 K/UL (ref 0–0.7)
EOSINOPHIL NFR BLD: 0.6 %
ERYTHROCYTE [DISTWIDTH] IN BLOOD BY AUTOMATED COUNT: 13 % (ref 11.5–14.5)
GLUCOSE BLD-MCNC: 118 MG/DL (ref 70–99)
GLUCOSE BLD-MCNC: 144 MG/DL (ref 70–99)
GLUCOSE BLD-MCNC: 176 MG/DL (ref 70–99)
GLUCOSE BLD-MCNC: 264 MG/DL (ref 70–99)
GLUCOSE SERPL-MCNC: 145 MG/DL (ref 70–99)
HCT VFR BLD AUTO: 36.8 % (ref 42–52)
HGB BLD-MCNC: 12.8 G/DL (ref 14–18)
LYMPHOCYTES # BLD: 1 K/UL (ref 1–4.8)
LYMPHOCYTES NFR BLD: 16.7 %
MCH RBC QN AUTO: 27.9 PG (ref 27–31.3)
MCHC RBC AUTO-ENTMCNC: 34.8 % (ref 33–37)
MCV RBC AUTO: 80.2 FL (ref 79–92.2)
MONOCYTES # BLD: 0.4 K/UL (ref 0.2–0.8)
MONOCYTES NFR BLD: 6.3 %
NEUTROPHILS # BLD: 4.7 K/UL (ref 1.4–6.5)
NEUTS SEG NFR BLD: 75.4 %
PERFORMED ON: ABNORMAL
PHOSPHATE SERPL-MCNC: 2.2 MG/DL (ref 2.3–4.8)
PLATELET # BLD AUTO: 244 K/UL (ref 130–400)
POTASSIUM SERPL-SCNC: 3.8 MEQ/L (ref 3.4–4.9)
RBC # BLD AUTO: 4.59 M/UL (ref 4.7–6.1)
SODIUM SERPL-SCNC: 139 MEQ/L (ref 135–144)
WBC # BLD AUTO: 6.2 K/UL (ref 4.8–10.8)

## 2024-02-21 PROCEDURE — 6370000000 HC RX 637 (ALT 250 FOR IP): Performed by: INTERNAL MEDICINE

## 2024-02-21 PROCEDURE — 80048 BASIC METABOLIC PNL TOTAL CA: CPT

## 2024-02-21 PROCEDURE — 2580000003 HC RX 258: Performed by: INTERNAL MEDICINE

## 2024-02-21 PROCEDURE — 2500000003 HC RX 250 WO HCPCS: Performed by: INTERNAL MEDICINE

## 2024-02-21 PROCEDURE — 85025 COMPLETE CBC W/AUTO DIFF WBC: CPT

## 2024-02-21 PROCEDURE — C9113 INJ PANTOPRAZOLE SODIUM, VIA: HCPCS | Performed by: INTERNAL MEDICINE

## 2024-02-21 PROCEDURE — 99231 SBSQ HOSP IP/OBS SF/LOW 25: CPT | Performed by: INTERNAL MEDICINE

## 2024-02-21 PROCEDURE — 6360000002 HC RX W HCPCS: Performed by: INTERNAL MEDICINE

## 2024-02-21 PROCEDURE — A4216 STERILE WATER/SALINE, 10 ML: HCPCS | Performed by: INTERNAL MEDICINE

## 2024-02-21 PROCEDURE — 1210000000 HC MED SURG R&B

## 2024-02-21 PROCEDURE — 84100 ASSAY OF PHOSPHORUS: CPT

## 2024-02-21 PROCEDURE — 36415 COLL VENOUS BLD VENIPUNCTURE: CPT

## 2024-02-21 PROCEDURE — 6360000002 HC RX W HCPCS: Performed by: PAIN MEDICINE

## 2024-02-21 PROCEDURE — 99221 1ST HOSP IP/OBS SF/LOW 40: CPT | Performed by: PAIN MEDICINE

## 2024-02-21 RX ORDER — LORAZEPAM 0.5 MG/1
0.5 TABLET ORAL ONCE
Status: COMPLETED | OUTPATIENT
Start: 2024-02-21 | End: 2024-02-21

## 2024-02-21 RX ORDER — LORAZEPAM 0.5 MG/1
0.5 TABLET ORAL NIGHTLY
Status: DISCONTINUED | OUTPATIENT
Start: 2024-02-21 | End: 2024-02-22 | Stop reason: HOSPADM

## 2024-02-21 RX ORDER — SODIUM CHLORIDE, SODIUM LACTATE, POTASSIUM CHLORIDE, CALCIUM CHLORIDE 600; 310; 30; 20 MG/100ML; MG/100ML; MG/100ML; MG/100ML
INJECTION, SOLUTION INTRAVENOUS CONTINUOUS
Status: DISPENSED | OUTPATIENT
Start: 2024-02-21 | End: 2024-02-21

## 2024-02-21 RX ORDER — HYDROXYZINE HYDROCHLORIDE 25 MG/1
25 TABLET, FILM COATED ORAL EVERY 6 HOURS PRN
Status: DISCONTINUED | OUTPATIENT
Start: 2024-02-21 | End: 2024-02-22 | Stop reason: HOSPADM

## 2024-02-21 RX ADMIN — MIRTAZAPINE 15 MG: 15 TABLET, FILM COATED ORAL at 21:40

## 2024-02-21 RX ADMIN — ALUMINUM HYDROXIDE, MAGNESIUM HYDROXIDE, AND SIMETHICONE: 200; 200; 20 SUSPENSION ORAL at 09:05

## 2024-02-21 RX ADMIN — SODIUM PHOSPHATE, MONOBASIC, MONOHYDRATE AND SODIUM PHOSPHATE, DIBASIC, ANHYDROUS 10 MMOL: 142; 276 INJECTION, SOLUTION INTRAVENOUS at 15:01

## 2024-02-21 RX ADMIN — QUETIAPINE FUMARATE 25 MG: 50 TABLET ORAL at 05:35

## 2024-02-21 RX ADMIN — DICYCLOMINE HYDROCHLORIDE 20 MG: 10 INJECTION, SOLUTION INTRAMUSCULAR at 21:39

## 2024-02-21 RX ADMIN — FAMOTIDINE 20 MG: 10 INJECTION, SOLUTION INTRAVENOUS at 09:10

## 2024-02-21 RX ADMIN — METOCLOPRAMIDE HYDROCHLORIDE 10 MG: 5 INJECTION INTRAMUSCULAR; INTRAVENOUS at 13:18

## 2024-02-21 RX ADMIN — INSULIN GLARGINE 8 UNITS: 100 INJECTION, SOLUTION SUBCUTANEOUS at 09:09

## 2024-02-21 RX ADMIN — BUSPIRONE HYDROCHLORIDE 20 MG: 10 TABLET ORAL at 13:18

## 2024-02-21 RX ADMIN — Medication 5 ML: at 21:50

## 2024-02-21 RX ADMIN — LORAZEPAM 0.5 MG: 0.5 TABLET ORAL at 21:40

## 2024-02-21 RX ADMIN — BUSPIRONE HYDROCHLORIDE 20 MG: 10 TABLET ORAL at 09:10

## 2024-02-21 RX ADMIN — PANTOPRAZOLE SODIUM 40 MG: 40 INJECTION, POWDER, FOR SOLUTION INTRAVENOUS at 09:11

## 2024-02-21 RX ADMIN — INSULIN GLARGINE 8 UNITS: 100 INJECTION, SOLUTION SUBCUTANEOUS at 21:49

## 2024-02-21 RX ADMIN — SODIUM CHLORIDE, POTASSIUM CHLORIDE, SODIUM LACTATE AND CALCIUM CHLORIDE: 600; 310; 30; 20 INJECTION, SOLUTION INTRAVENOUS at 13:24

## 2024-02-21 RX ADMIN — LORAZEPAM 0.5 MG: 0.5 TABLET ORAL at 11:11

## 2024-02-21 RX ADMIN — DICYCLOMINE HYDROCHLORIDE 20 MG: 10 INJECTION, SOLUTION INTRAMUSCULAR at 16:57

## 2024-02-21 RX ADMIN — INSULIN LISPRO 2 UNITS: 100 INJECTION, SOLUTION INTRAVENOUS; SUBCUTANEOUS at 13:18

## 2024-02-21 RX ADMIN — DICYCLOMINE HYDROCHLORIDE 20 MG: 10 INJECTION, SOLUTION INTRAMUSCULAR at 13:56

## 2024-02-21 RX ADMIN — METOCLOPRAMIDE HYDROCHLORIDE 10 MG: 5 INJECTION INTRAMUSCULAR; INTRAVENOUS at 05:34

## 2024-02-21 RX ADMIN — DICYCLOMINE HYDROCHLORIDE 20 MG: 10 INJECTION, SOLUTION INTRAMUSCULAR at 09:17

## 2024-02-21 RX ADMIN — HYDROMORPHONE HYDROCHLORIDE 0.5 MG: 1 INJECTION, SOLUTION INTRAMUSCULAR; INTRAVENOUS; SUBCUTANEOUS at 16:59

## 2024-02-21 RX ADMIN — CYCLOBENZAPRINE 10 MG: 10 TABLET, FILM COATED ORAL at 05:35

## 2024-02-21 RX ADMIN — Medication 10 ML: at 09:09

## 2024-02-21 RX ADMIN — BUSPIRONE HYDROCHLORIDE 20 MG: 10 TABLET ORAL at 21:40

## 2024-02-21 ASSESSMENT — PAIN SCALES - GENERAL
PAINLEVEL_OUTOF10: 7
PAINLEVEL_OUTOF10: 6
PAINLEVEL_OUTOF10: 5

## 2024-02-21 ASSESSMENT — PAIN DESCRIPTION - ORIENTATION: ORIENTATION: UPPER

## 2024-02-21 ASSESSMENT — PAIN DESCRIPTION - LOCATION
LOCATION: ABDOMEN

## 2024-02-21 NOTE — CONSULTS
Department of Chronic Pain Managment  Attending Progress Note      SUBJECTIVE  Pain abdomen. Epigastric,    OBJECTIVE: Sang Escalona is a 49 y.o. male who presents to the emergency department for evaluation of epigastric abdominal pain that is severe.  Patient has history of hyperemesis syndrome from THC.  He states he has not smoked in a while and then he did smoke 3 weeks ago. About 5 days ago epigastric pain started and has been intermittent until tonight and the pain severe has not been relieved.   Last emisis was 2 days ago still has pain abdomen.    Medications  Current Facility-Administered Medications: lactated ringers IV soln infusion, , IntraVENous, Continuous  hydrOXYzine HCl (ATARAX) tablet 25 mg, 25 mg, Oral, Q6H PRN  LORazepam (ATIVAN) tablet 0.5 mg, 0.5 mg, Oral, Nightly  sodium phosphate 10 mmol in sodium chloride 0.9 % 250 mL IVPB, 10 mmol, IntraVENous, Once  HYDROmorphone (DILAUDID) injection 0.5 mg, 0.5 mg, IntraVENous, Once  busPIRone (BUSPAR) tablet 20 mg, 20 mg, Oral, TID  mirtazapine (REMERON) tablet 15 mg, 15 mg, Oral, Nightly  QUEtiapine (SEROQUEL) tablet 25 mg, 25 mg, Oral, BID PRN  pantoprazole (PROTONIX) 40 mg in sodium chloride (PF) 0.9 % 10 mL injection, 40 mg, IntraVENous, Daily  dicyclomine (BENTYL) injection 20 mg, 20 mg, IntraMUSCular, 4x Daily  enoxaparin Sodium (LOVENOX) injection 30 mg, 30 mg, SubCUTAneous, BID  insulin glargine (LANTUS) injection vial 8 Units, 8 Units, SubCUTAneous, BID  sodium chloride flush 0.9 % injection 5-40 mL, 5-40 mL, IntraVENous, 2 times per day  sodium chloride flush 0.9 % injection 5-40 mL, 5-40 mL, IntraVENous, PRN  0.9 % sodium chloride infusion, , IntraVENous, PRN  ondansetron (ZOFRAN-ODT) disintegrating tablet 4 mg, 4 mg, Oral, Q8H PRN **OR** ondansetron (ZOFRAN) injection 4 mg, 4 mg, IntraVENous, Q6H PRN  [DISCONTINUED] acetaminophen (TYLENOL) tablet 650 mg, 650 mg, Oral, Q6H PRN **OR** acetaminophen (TYLENOL) suppository 650 mg, 650 mg, Rectal,

## 2024-02-22 VITALS
HEART RATE: 80 BPM | SYSTOLIC BLOOD PRESSURE: 153 MMHG | TEMPERATURE: 98.4 F | WEIGHT: 222 LBS | BODY MASS INDEX: 31.78 KG/M2 | HEIGHT: 70 IN | OXYGEN SATURATION: 95 % | RESPIRATION RATE: 18 BRPM | DIASTOLIC BLOOD PRESSURE: 96 MMHG

## 2024-02-22 LAB
ANION GAP SERPL CALCULATED.3IONS-SCNC: 13 MEQ/L (ref 9–15)
BUN SERPL-MCNC: 17 MG/DL (ref 6–20)
CALCIUM SERPL-MCNC: 8.7 MG/DL (ref 8.5–9.9)
CHLORIDE SERPL-SCNC: 105 MEQ/L (ref 95–107)
CO2 SERPL-SCNC: 24 MEQ/L (ref 20–31)
CREAT SERPL-MCNC: 0.93 MG/DL (ref 0.7–1.2)
GLUCOSE BLD-MCNC: 113 MG/DL (ref 70–99)
GLUCOSE BLD-MCNC: 123 MG/DL (ref 70–99)
GLUCOSE SERPL-MCNC: 78 MG/DL (ref 70–99)
PERFORMED ON: ABNORMAL
PERFORMED ON: ABNORMAL
PHOSPHATE SERPL-MCNC: 2.3 MG/DL (ref 2.3–4.8)
POTASSIUM SERPL-SCNC: 3.7 MEQ/L (ref 3.4–4.9)
SODIUM SERPL-SCNC: 142 MEQ/L (ref 135–144)

## 2024-02-22 PROCEDURE — 84100 ASSAY OF PHOSPHORUS: CPT

## 2024-02-22 PROCEDURE — A4216 STERILE WATER/SALINE, 10 ML: HCPCS | Performed by: INTERNAL MEDICINE

## 2024-02-22 PROCEDURE — C9113 INJ PANTOPRAZOLE SODIUM, VIA: HCPCS | Performed by: INTERNAL MEDICINE

## 2024-02-22 PROCEDURE — 80048 BASIC METABOLIC PNL TOTAL CA: CPT

## 2024-02-22 PROCEDURE — 6370000000 HC RX 637 (ALT 250 FOR IP): Performed by: INTERNAL MEDICINE

## 2024-02-22 PROCEDURE — 2580000003 HC RX 258: Performed by: INTERNAL MEDICINE

## 2024-02-22 PROCEDURE — 99231 SBSQ HOSP IP/OBS SF/LOW 25: CPT | Performed by: INTERNAL MEDICINE

## 2024-02-22 PROCEDURE — 6360000002 HC RX W HCPCS: Performed by: INTERNAL MEDICINE

## 2024-02-22 PROCEDURE — 36415 COLL VENOUS BLD VENIPUNCTURE: CPT

## 2024-02-22 RX ORDER — BUSPIRONE HYDROCHLORIDE 15 MG/1
20 TABLET ORAL 3 TIMES DAILY
COMMUNITY
Start: 2024-02-22

## 2024-02-22 RX ORDER — INSULIN LISPRO 100 [IU]/ML
0-4 INJECTION, SOLUTION INTRAVENOUS; SUBCUTANEOUS
Status: DISCONTINUED | OUTPATIENT
Start: 2024-02-22 | End: 2024-02-22 | Stop reason: HOSPADM

## 2024-02-22 RX ORDER — INSULIN LISPRO 100 [IU]/ML
0-4 INJECTION, SOLUTION INTRAVENOUS; SUBCUTANEOUS NIGHTLY
Status: DISCONTINUED | OUTPATIENT
Start: 2024-02-22 | End: 2024-02-22 | Stop reason: HOSPADM

## 2024-02-22 RX ADMIN — Medication 10 ML: at 08:15

## 2024-02-22 RX ADMIN — BUSPIRONE HYDROCHLORIDE 20 MG: 10 TABLET ORAL at 08:14

## 2024-02-22 RX ADMIN — DICYCLOMINE HYDROCHLORIDE 20 MG: 10 INJECTION, SOLUTION INTRAMUSCULAR at 08:14

## 2024-02-22 RX ADMIN — PANTOPRAZOLE SODIUM 40 MG: 40 INJECTION, POWDER, FOR SOLUTION INTRAVENOUS at 08:14

## 2024-02-22 ASSESSMENT — PAIN SCALES - GENERAL: PAINLEVEL_OUTOF10: 1

## 2024-02-22 ASSESSMENT — ENCOUNTER SYMPTOMS: ABDOMINAL PAIN: 1

## 2024-02-22 ASSESSMENT — PAIN DESCRIPTION - LOCATION: LOCATION: ABDOMEN

## 2024-02-22 NOTE — CARE COORDINATION
DC PLAN REMAINS HOME ONCE MEDICALLY CLEARED.   
DC PLAN REMAINS HOME ONCE MEDICALLY CLEARED.       
I called [facility] to report that pt thinks he left phone in ambulance. They will contact crew that brought him and I gave them number to 1wt to call and let them know if found.   
I spoke to Daisy from Medical Services and pt needs order from doctor to decrease the pressure that is at 14cm. She states he has never called in or reported any issues with his Cpap. I let pt know that his machine is not broke but needs pressure lowered.   
ON IVF, STILL C/O PAIN.  DC PLAN REMAINS HOME ONCE CLEARED BY DRS.   
care/goals and shares the quality data associated with the providers was provided to:     Patient Representative Name:       The Patient and/or Patient Representative Agree with the Discharge Plan?      Natalie Tinsley  Case Management Department

## 2024-02-22 NOTE — DISCHARGE INSTR - DIET

## 2024-02-22 NOTE — PROGRESS NOTES
Pharmacy Dose Adjustment Per Protocol    Sang Escalona is a 49 y.o. male.     Recent Labs     02/19/24  1434 02/20/24  0542   BUN 76* 55*   CREATININE 2.71* 1.61*   Estimated Creatinine Clearance: 65 mL/min (A) (based on SCr of 1.61 mg/dL (H)).  .    Height: 177.8 cm (5' 10\"), Weight - Scale: 101 kg (222 lb 9.6 oz)    If yes to following, excluded from auto adjustment in Table 1 of policy - please contact provider with recommendations as appropriate.  Include condition/exception in scratch notes. Yes No   Trauma Service or Ortho Surgery []  [x]    COVID []  [x]    Pregnancy []  [x]        Current order:  Enoxaparin 30 mg SUBQ once daily      Plan:  Pharmacologic VTE prophylaxis modified based on patient weight and renal function per [facility]/P&T approved protocol     Patient Weight (kg)      50.9 and below .9 101-150.9 151-174.9 175 or greater   Estimated   CrCl  (ml/min) 30 or greater []   30 mg   SUBQ daily   []   40 mg   SUBQ daily (or 30 mg BID for orthopedic cases) [x]  30 mg SUBQ   BID  []  40 mg   SUBQ   BID []  60mg SUBQ BID    15-29.9 []  UFH 5000   units SUBQ BID []  30 mg   SUBQ daily [] 30 mg SUBQ   daily []  40 mg SUBQ   daily [] 60 mg SUBQ   daily    Less than 15 or dialysis []  UFH 5000   units SUBQ BID [] UFH 5000 units SUBQ TID []  UFH 7500   units   SUBQ TID       Thank you,  Jennifer Lerma, PharmD  PGY1 Pharmacy Resident  2/20/2024 1:21 PM  
0800: assessment completed. Patient requesting to restart his home medications. Dr. Trevino sent secure message. See new order. Patient also will be medicated with PRN for abd pain from cannabis per patient.     0920: patient doubled over in pain. Stating its getting worse. Secure message sent to Dr. Trevino. See new orders.     1000: Patient medicated with IV ativan for abd pain. Will continue to assess.     1055: patient still has complaint of abd pain and moaning in pain when nurse enters or walks by room. Dr. Trevino made aware.     1205: patient complaint of pain increasing and wanting something more. Dr. Trevino made aware and also made aware that patient wants to speak with him.   
Comprehensive Nutrition Assessment    Type and Reason for Visit:  Initial    Nutrition Recommendations/Plan:   Modify supplements to diabetic version   Continue carb control 4  May need to consider parenteral nutrition if symptoms do not subside  Antiemetic PRN     Malnutrition Assessment:  Malnutrition Status:  At risk for malnutrition (Comment) (02/19/24 1118)    Context:  Social/Environmental Circumstances     Findings of the 6 clinical characteristics of malnutrition:  Energy Intake:  Mild decrease in energy intake (Comment)  Weight Loss:  No significant weight loss     Body Fat Loss:  No significant body fat loss     Muscle Mass Loss:  No significant muscle mass loss    Fluid Accumulation:  No significant fluid accumulation     Strength:  Not Performed    Nutrition Assessment:    Pt nutritional compromised d/t cannabinoid hyperemesis with decreased PO intake x2-3 weeks now with JANETH. Stable wts per EMR. DM dx noted- RD to modify current supplements to diabetic version. Will continue to monitor. May need to consider parenteral nutrition if symptoms do not subside.    Nutrition Related Findings:    Pt presents with abd pain dx JANETH. Hx of cannabinoid hyperemesis Pt reports using marijuana 3 weeks ago. pmhx: ETOTH abuse sober x 1 yr, DM, depression. Reports decreased PO intake and wt loss. BM not documented. No edema. Declining food at this time d/t N/V. NS @200ml/hr. Zofran ordered PRN not given yet. Gluc >300; Bun/Cr significantly elevated. Wound Type: None       Current Nutrition Intake & Therapies:    Average Meal Intake: 0%  Average Supplements Intake: 0%  ADULT DIET; Regular; 4 carb choices (60 gm/meal)  ADULT ORAL NUTRITION SUPPLEMENT; Breakfast, Lunch, Dinner; Standard High Calorie/High Protein Oral Supplement    Anthropometric Measures:  Height: 177.8 cm (5' 10\")  Ideal Body Weight (IBW): 166 lbs (75 kg)    Admission Body Weight: 95.3 kg (210 lb) (stated)  Current Body Weight: 104 kg (229 lb 5 oz) 
Diabetes Education : SN in to see patient , introduced myself and purpose of visit. Education and insulin teaching. Sang says he is familiar with insulin, has used a pen for Lantus in the past , reviewed 2 units with each new needle and counting to 10 with injections, he stated understanding and has declined in patient and outpatient education .   
Patient arrived to [facility] room 185 from ER. Able to ambulate to room from transport cot in brock. A&O x 4.     Skin to back is red and sore, and patient states this is from a hot shower, which he states he took a very hot shower to try to treat his canniboid hyperemesis symptoms.    Patient arrived on continuous tele monitor, on RA, and has NSS infusig at 200 cc/hr.      Stefanie MEYER, RN, [facility]  2/19/2024 6:51 AM    
Physician Progress Note    2/21/2024   3:53 PM    Name:  Sang Escalona  MRN:    [id number]     IP Day: 2     Admit Date: 2/19/2024  3:14 AM  [facility]: [facility]    Code Status:  Full Code    Subjective:     He is worried about going home today because of his abdominal discomfort.      Current Facility-Administered Medications   Medication Dose Route Frequency Provider Last Rate Last Admin    lactated ringers IV soln infusion   IntraVENous Continuous Uriel, Neal R,  mL/hr at 02/21/24 1324 New Bag at 02/21/24 1324    hydrOXYzine HCl (ATARAX) tablet 25 mg  25 mg Oral Q6H PRN Uriel, Neal R, DO        LORazepam (ATIVAN) tablet 0.5 mg  0.5 mg Oral Nightly Uriel, Neal R, DO        sodium phosphate 10 mmol in sodium chloride 0.9 % 250 mL IVPB  10 mmol IntraVENous Once Uriel, Neal R, DO 62.5 mL/hr at 02/21/24 1501 10 mmol at 02/21/24 1501    busPIRone (BUSPAR) tablet 20 mg  20 mg Oral TID Uriel, Neal R, DO   20 mg at 02/21/24 1318    mirtazapine (REMERON) tablet 15 mg  15 mg Oral Nightly Uriel, Swapnil R, DO   15 mg at 02/20/24 2044    QUEtiapine (SEROQUEL) tablet 25 mg  25 mg Oral BID PRN Uriel, Neal R, DO   25 mg at 02/21/24 0535    pantoprazole (PROTONIX) 40 mg in sodium chloride (PF) 0.9 % 10 mL injection  40 mg IntraVENous Daily Uriel, Neal R, DO   40 mg at 02/21/24 0911    dicyclomine (BENTYL) injection 20 mg  20 mg IntraMUSCular 4x Daily Uriel, Neal R, DO   20 mg at 02/21/24 1356    enoxaparin Sodium (LOVENOX) injection 30 mg  30 mg SubCUTAneous BID Malinda Horan DO        insulin glargine (LANTUS) injection vial 8 Units  8 Units SubCUTAneous BID Lj Lopez MD   8 Units at 02/21/24 0909    sodium chloride flush 0.9 % injection 5-40 mL  5-40 mL IntraVENous 2 times per day Malinda Horan, DO   10 mL at 02/21/24 0909    sodium chloride flush 0.9 % injection 5-40 mL  5-40 mL IntraVENous PRN Malinda Horan,         0.9 % sodium chloride infusion   IntraVENous PRN 
Progress Note  Date:2024       Room:85/W185-01  Patient Name:Sang Escalona     YOB: 1975     Age:49 y.o.    Chief complaint uncontrolled type 2 diabetes    Subjective    Subjective:  Symptoms:  Stable.    Diet:  Poor intake.    Activity level: Impaired due to weakness.    Pain:  He complains of pain that is mild.       Review of Systems   Gastrointestinal:  Positive for abdominal pain.   All other systems reviewed and are negative.    Objective         Vitals Last 24 Hours:  TEMPERATURE:  Temp  Av.1 °F (36.7 °C)  Min: 97.7 °F (36.5 °C)  Max: 99 °F (37.2 °C)  RESPIRATIONS RANGE: Resp  Av  Min: 18  Max: 18  PULSE OXIMETRY RANGE: SpO2  Av.5 %  Min: 94 %  Max: 95 %  PULSE RANGE: Pulse  Av.3  Min: 68  Max: 77  BLOOD PRESSURE RANGE: Systolic (24hrs), Av , Min:113 , Max:131   ; Diastolic (24hrs), Av, Min:59, Max:88    I/O (24Hr):    Intake/Output Summary (Last 24 hours) at 2024 0757  Last data filed at 2024 2234  Gross per 24 hour   Intake 1840 ml   Output --   Net 1840 ml     Objective:  General Appearance:  Comfortable.    Vital signs: (most recent): Blood pressure 126/88, pulse 77, temperature 97.7 °F (36.5 °C), temperature source Oral, resp. rate 18, height 1.778 m (5' 10\"), weight 100.7 kg (222 lb), SpO2 94 %.  Vital signs are normal.    HEENT: Normal HEENT exam.    Lungs:  Normal effort and normal respiratory rate.    Heart: Normal rate.    Abdomen: Abdomen is soft.    Extremities: Normal range of motion.    Neurological: Patient is alert.    Skin:  No rash.     Labs/Imaging/Diagnostics    Labs:  CBC:  Recent Labs     24  0546   WBC 6.2   RBC 4.59*   HGB 12.8*   HCT 36.8*   MCV 80.2   RDW 13.0        CHEMISTRIES:  Recent Labs     24  0542 24  0546 24  0522    139 142   K 3.5 3.8 3.7    105 105   CO2 20 21 24   BUN 55* 30* 17   CREATININE 1.61* 1.15 0.93   GLUCOSE 164* 145* 78   PHOS 2.6 2.2* 2.3   MG 2.1  --   --  
Progress Note  Date:2024       Room:85/W185-01  Patient Name:Sang Escalona     YOB: 1975     Age:49 y.o.    Chief complaint uncontrolled type 2 diabetes    Subjective    Subjective:  Symptoms:  Stable.    Diet:  Poor intake.    Activity level: Impaired due to weakness.    Pain:  He complains of pain that is moderate.       Review of Systems   Gastrointestinal:  Positive for abdominal pain.   All other systems reviewed and are negative.    Objective         Vitals Last 24 Hours:  TEMPERATURE:  Temp  Av °F (36.7 °C)  Min: 97.5 °F (36.4 °C)  Max: 98.6 °F (37 °C)  RESPIRATIONS RANGE: Resp  Av.3  Min: 18  Max: 20  PULSE OXIMETRY RANGE: SpO2  Av %  Min: 95 %  Max: 100 %  PULSE RANGE: Pulse  Av.3  Min: 39  Max: 76  BLOOD PRESSURE RANGE: Systolic (24hrs), Av , Min:123 , Max:168   ; Diastolic (24hrs), Av, Min:80, Max:106    I/O (24Hr):    Intake/Output Summary (Last 24 hours) at 2024 2116  Last data filed at 2024 1604  Gross per 24 hour   Intake 2517.39 ml   Output --   Net 2517.39 ml     Objective:  General Appearance:  Ill-appearing.    Vital signs: (most recent): Blood pressure (!) 168/106, pulse (!) 39, temperature 97.9 °F (36.6 °C), temperature source Oral, resp. rate 18, height 1.778 m (5' 10\"), weight 101 kg (222 lb 9.6 oz), SpO2 100 %.  Vital signs are normal.    HEENT: Normal HEENT exam.    Lungs:  Normal effort.    Heart: Normal rate.    Abdomen: Abdomen is soft.  There is epigastric tenderness.    Extremities: Normal range of motion.    Neurological: Patient is alert.    Skin:  No rash.     Labs/Imaging/Diagnostics    Labs:  CBC:  Recent Labs     24  0315   WBC 16.1*   RBC 5.70   HGB 16.2   HCT 45.2   MCV 79.3   RDW 13.2   *     CHEMISTRIES:  Recent Labs     24  0600 24  1206 24  1434 24  0542   NA  --  139 138 141   K  --  3.8 4.3 3.5   CL  --  103 104 107   CO2  --  19* 19* 20   BUN  --  82* 76* 55*   CREATININE  --  
Progress Note  Date:2024       [facility]  Patient Name:Sang Escalona     YOB: 1975     Age:49 y.o.    Chief complaint uncontrolled type 2 diabetes    Subjective    Subjective:  Symptoms:  Stable.    Diet:  Adequate intake.    Activity level: Returning to normal.    Pain:  He reports no pain.       Review of Systems  Objective         Vitals Last 24 Hours:  TEMPERATURE:  Temp  Av.2 °F (36.8 °C)  Min: 97.7 °F (36.5 °C)  Max: 99 °F (37.2 °C)  RESPIRATIONS RANGE: Resp  Av  Min: 18  Max: 18  PULSE OXIMETRY RANGE: SpO2  Av.3 %  Min: 94 %  Max: 97 %  PULSE RANGE: Pulse  Av.6  Min: 68  Max: 80  BLOOD PRESSURE RANGE: Systolic (24hrs), Av , Min:113 , Max:157   ; Diastolic (24hrs), Av, Min:59, Max:98    I/O (24Hr):    Intake/Output Summary (Last 24 hours) at 2024 1309  Last data filed at 2024 2234  Gross per 24 hour   Intake 1360 ml   Output --   Net 1360 ml     Objective:  General Appearance:  Comfortable.    Vital signs: (most recent): Blood pressure (!) 153/96, pulse 80, temperature 98.4 °F (36.9 °C), temperature source Oral, resp. rate 18, height 1.778 m (5' 10\"), weight 100.7 kg (222 lb), SpO2 95 %.  Vital signs are normal.    HEENT: Normal HEENT exam.    Lungs:  Normal effort.    Heart: Normal rate.    Abdomen: Abdomen is soft.    Extremities: Normal range of motion.    Neurological: Patient is alert and oriented to person, place and time.    Skin:  No rash.     Labs/Imaging/Diagnostics    Labs:  CBC:  Recent Labs     24  0546   WBC 6.2   RBC 4.59*   HGB 12.8*   HCT 36.8*   MCV 80.2   RDW 13.0        CHEMISTRIES:  Recent Labs     24  0542 24  0546 24  0522    139 142   K 3.5 3.8 3.7    105 105   CO2 20 21 24   BUN 55* 30* 17   CREATININE 1.61* 1.15 0.93   GLUCOSE 164* 145* 78   PHOS 2.6 2.2* 2.3   MG 2.1  --   --      PT/INR:No results for input(s): \"PROTIME\", \"INR\" in the last 72 hours.  APTT:No results for 
lispro (HUMALOG) injection vial 0-8 Units  0-8 Units SubCUTAneous TID WC Uriel, Swapnil R, DO   2 Units at 24 0812    insulin lispro (HUMALOG) injection vial 0-4 Units  0-4 Units SubCUTAneous Nightly Uriel, Swapnil R, DO        0.9 % sodium chloride infusion   IntraVENous Continuous Uriel, Swapnil R,  mL/hr at 24 0530 New Bag at 24 0530       Physical Examination:      Vitals:  /80   Pulse 76   Temp 97.5 °F (36.4 °C) (Oral)   Resp 20   Ht 1.778 m (5' 10\")   Wt 101 kg (222 lb 9.6 oz)   SpO2 99%   BMI 31.94 kg/m²   Temp (24hrs), Av °F (36.7 °C), Min:97.3 °F (36.3 °C), Max:98.6 °F (37 °C)      General appearance: alert, cooperative and no distress  Mental Status: oriented to person, place and time and normal affect  Lungs: clear to auscultation bilaterally, normal effort  Heart: regular rate and rhythm, no murmur  Abdomen: soft, nontender, nondistended, bowel sounds present, no masses  Extremities: no edema, redness, tenderness in the calves. Cap refill <2s  Skin: no gross lesions, rashes    Data:     Labs:  Recent Labs     24  0315   WBC 16.1*   HGB 16.2   *     Recent Labs     24  1434 24  0542    141   K 4.3 3.5    107   CO2 19* 20   BUN 76* 55*   CREATININE 2.71* 1.61*   GLUCOSE 112* 164*     Recent Labs     24  0315   AST 41*   ALT 47*   BILITOT 1.2*   ALKPHOS 143*       Assessment and Plan:        1.  JANETH due to severe dehydration possibly related to cyclical vomiting syndrome from cannabinoid use: Improving  -IVF and supportive care with antiemetic, benzodiazepine, nonopioid pain control    2.  Type 2 diabetes with mild ketoacidosis  -Low-dose Lantus with sliding scale insulin.  Endocrinology following    3.  Severe alcohol use disorder last use 1 month ago    Fatty liver disease  EMANUEL  Obesity  Depression    Diet: ADULT DIET; Regular; 4 carb choices (60 gm/meal)  Ppx: lovenox  Full Code    37 minutes in total care

## 2024-02-22 NOTE — DISCHARGE SUMMARY
[facility] Medicine Discharge Summary    Sang Escalona  :  1975  MRN:  [id number]    Admit date:  2024  Discharge date:  2024    Admitting Physician:  Malinda Horan DO  Primary Care Physician:  [facility]    Discharge Diagnoses:    Principal Problem:    JANETH (acute kidney injury) (HCC)  Active Problems:    Inadequately controlled diabetes mellitus (HCC)  Resolved Problems:    * No resolved hospital problems. *    Chief Complaint   Patient presents with    Abdominal Pain       Condition: improved   Activity: no restrictions   Diet: diabetic  Disposition: home  Functional Status: ambulatory    Significant Findings:         Latest Ref Rng & Units 2024     5:22 AM 2024     5:46 AM 2024     5:42 AM 2024     2:34 PM 2024    12:06 PM   Labs Renal   BUN 6 - 20 mg/dL 17  30  55  76  82    Cr 0.70 - 1.20 mg/dL 0.93  1.15  1.61  2.71  2.97    K 3.4 - 4.9 mEq/L 3.7  3.8  3.5  4.3  3.8    Na 135 - 144 mEq/L 142  139  141  138  139        Hospital Course:   49-year-old male with type 2 diabetes, severe alcohol use disorder in remission (last use 1 month ago), fatty liver disease, EMANUEL, obesity was hospitalized after experiencing cyclical vomiting for several days.  He was found to have JANETH due to severe dehydration.  He ultimately improved with IV fluid and supportive care.  He was discharged home in stable condition on .  Endocrinology did not recommend any insulin upon discharge.  He will follow-up after discharge with PCP through [facility] and dentistry.      Exam on Discharge:   BP (!) 153/96   Pulse 80   Temp 98.4 °F (36.9 °C) (Oral)   Resp 18   Ht 1.778 m (5' 10\")   Wt 100.7 kg (222 lb)   SpO2 95%   BMI 31.85 kg/m²   General appearance: alert, cooperative and no distress  Mental Status: oriented to person, place and time and normal affect  Lungs: clear to auscultation bilaterally, normal effort  Heart: regular

## 2024-02-22 NOTE — FLOWSHEET NOTE
Nurse reviewed discharge paperwork with patient. Patient states they have no further questions regarding discharge plan. IV and heart monitor removed. Transport called.Electronically signed by Winifred Walker RN on 2/22/2024 at 12:55 PM

## 2024-02-25 ENCOUNTER — APPOINTMENT (OUTPATIENT)
Dept: GENERAL RADIOLOGY | Age: 49
End: 2024-02-25
Payer: MEDICAID

## 2024-02-25 ENCOUNTER — HOSPITAL ENCOUNTER (EMERGENCY)
Age: 49
Discharge: HOME | End: 2024-02-25
Attending: STUDENT IN AN ORGANIZED HEALTH CARE EDUCATION/TRAINING PROGRAM
Payer: MEDICAID

## 2024-02-25 VITALS
BODY MASS INDEX: 32.58 KG/M2 | DIASTOLIC BLOOD PRESSURE: 103 MMHG | HEART RATE: 80 BPM | RESPIRATION RATE: 20 BRPM | HEIGHT: 68 IN | TEMPERATURE: 98.3 F | OXYGEN SATURATION: 99 % | WEIGHT: 215 LBS | SYSTOLIC BLOOD PRESSURE: 168 MMHG

## 2024-02-25 DIAGNOSIS — F12.90 CANNABINOID HYPEREMESIS SYNDROME: Primary | ICD-10-CM

## 2024-02-25 DIAGNOSIS — R10.84 GENERALIZED ABDOMINAL PAIN: ICD-10-CM

## 2024-02-25 DIAGNOSIS — R11.2 CANNABINOID HYPEREMESIS SYNDROME: Primary | ICD-10-CM

## 2024-02-25 DIAGNOSIS — R11.2 NAUSEA AND VOMITING, UNSPECIFIED VOMITING TYPE: ICD-10-CM

## 2024-02-25 LAB
ALBUMIN SERPL-MCNC: 4.2 G/DL (ref 3.5–4.6)
ALP SERPL-CCNC: 118 U/L (ref 35–104)
ALT SERPL-CCNC: 71 U/L (ref 0–41)
ANION GAP SERPL CALCULATED.3IONS-SCNC: 16 MEQ/L (ref 9–15)
AST SERPL-CCNC: 25 U/L (ref 0–40)
BASOPHILS # BLD: 0.1 K/UL (ref 0–0.2)
BASOPHILS NFR BLD: 0.6 %
BILIRUB SERPL-MCNC: 0.7 MG/DL (ref 0.2–0.7)
BUN SERPL-MCNC: 12 MG/DL (ref 6–20)
CHLORIDE SERPL-SCNC: 100 MEQ/L (ref 95–107)
CO2 SERPL-SCNC: 18 MEQ/L (ref 20–31)
CREAT SERPL-MCNC: 1.19 MG/DL (ref 0.7–1.2)
EOSINOPHIL # BLD: 0.1 K/UL (ref 0–0.7)
EOSINOPHIL NFR BLD: 1.7 %
ERYTHROCYTE [DISTWIDTH] IN BLOOD BY AUTOMATED COUNT: 12.9 % (ref 11.5–14.5)
ETHANOL PERCENT: NORMAL G/DL
ETHANOLAMINE SERPL-MCNC: <10 MG/DL (ref 0–0.08)
GLOBULIN SER CALC-MCNC: 2.4 G/DL (ref 2.3–3.5)
GLUCOSE SERPL-MCNC: 209 MG/DL (ref 70–99)
HCT VFR BLD AUTO: 38.7 % (ref 42–52)
HGB BLD-MCNC: 14 G/DL (ref 14–18)
LACTATE BLDV-SCNC: 1.9 MMOL/L (ref 0.5–2.2)
LACTATE BLDV-SCNC: 2.8 MMOL/L (ref 0.5–2.2)
LIPASE SERPL-CCNC: 34 U/L (ref 12–95)
LYMPHOCYTES # BLD: 3 K/UL (ref 1–4.8)
LYMPHOCYTES NFR BLD: 36 %
MAGNESIUM SERPL-MCNC: 2.6 MG/DL (ref 1.7–2.4)
MCH RBC QN AUTO: 28.5 PG (ref 27–31.3)
MCHC RBC AUTO-ENTMCNC: 36.2 % (ref 33–37)
MCV RBC AUTO: 78.7 FL (ref 79–92.2)
MONOCYTES # BLD: 0.8 K/UL (ref 0.2–0.8)
MONOCYTES NFR BLD: 9.1 %
NEUTROPHILS # BLD: 4.4 K/UL (ref 1.4–6.5)
NEUTS SEG NFR BLD: 52 %
PLATELET # BLD AUTO: 312 K/UL (ref 130–400)
POTASSIUM SERPL-SCNC: 3.4 MEQ/L (ref 3.4–4.9)
PROT SERPL-MCNC: 6.6 G/DL (ref 6.3–8)
RBC # BLD AUTO: 4.92 M/UL (ref 4.7–6.1)
SODIUM SERPL-SCNC: 134 MEQ/L (ref 135–144)
WBC # BLD AUTO: 8.4 K/UL (ref 4.8–10.8)

## 2024-02-25 PROCEDURE — 36415 COLL VENOUS BLD VENIPUNCTURE: CPT

## 2024-02-25 PROCEDURE — 6370000000 HC RX 637 (ALT 250 FOR IP): Performed by: STUDENT IN AN ORGANIZED HEALTH CARE EDUCATION/TRAINING PROGRAM

## 2024-02-25 PROCEDURE — 83690 ASSAY OF LIPASE: CPT

## 2024-02-25 PROCEDURE — 83735 ASSAY OF MAGNESIUM: CPT

## 2024-02-25 PROCEDURE — 2580000003 HC RX 258: Performed by: STUDENT IN AN ORGANIZED HEALTH CARE EDUCATION/TRAINING PROGRAM

## 2024-02-25 PROCEDURE — A4216 STERILE WATER/SALINE, 10 ML: HCPCS | Performed by: STUDENT IN AN ORGANIZED HEALTH CARE EDUCATION/TRAINING PROGRAM

## 2024-02-25 PROCEDURE — 83605 ASSAY OF LACTIC ACID: CPT

## 2024-02-25 PROCEDURE — 74022 RADEX COMPL AQT ABD SERIES: CPT

## 2024-02-25 PROCEDURE — 6360000002 HC RX W HCPCS: Performed by: STUDENT IN AN ORGANIZED HEALTH CARE EDUCATION/TRAINING PROGRAM

## 2024-02-25 PROCEDURE — 96365 THER/PROPH/DIAG IV INF INIT: CPT

## 2024-02-25 PROCEDURE — 2500000003 HC RX 250 WO HCPCS: Performed by: STUDENT IN AN ORGANIZED HEALTH CARE EDUCATION/TRAINING PROGRAM

## 2024-02-25 PROCEDURE — 99285 EMERGENCY DEPT VISIT HI MDM: CPT

## 2024-02-25 PROCEDURE — 96372 THER/PROPH/DIAG INJ SC/IM: CPT

## 2024-02-25 PROCEDURE — 85025 COMPLETE CBC W/AUTO DIFF WBC: CPT

## 2024-02-25 PROCEDURE — 93005 ELECTROCARDIOGRAM TRACING: CPT | Performed by: STUDENT IN AN ORGANIZED HEALTH CARE EDUCATION/TRAINING PROGRAM

## 2024-02-25 PROCEDURE — 80053 COMPREHEN METABOLIC PANEL: CPT

## 2024-02-25 PROCEDURE — 82077 ASSAY SPEC XCP UR&BREATH IA: CPT

## 2024-02-25 PROCEDURE — 96375 TX/PRO/DX INJ NEW DRUG ADDON: CPT

## 2024-02-25 RX ORDER — DIPHENHYDRAMINE HYDROCHLORIDE 50 MG/ML
25 INJECTION INTRAMUSCULAR; INTRAVENOUS ONCE
Status: COMPLETED | OUTPATIENT
Start: 2024-02-25 | End: 2024-02-25

## 2024-02-25 RX ORDER — 0.9 % SODIUM CHLORIDE 0.9 %
2000 INTRAVENOUS SOLUTION INTRAVENOUS ONCE
Status: COMPLETED | OUTPATIENT
Start: 2024-02-25 | End: 2024-02-25

## 2024-02-25 RX ORDER — MAGNESIUM SULFATE IN WATER 40 MG/ML
2000 INJECTION, SOLUTION INTRAVENOUS ONCE
Status: COMPLETED | OUTPATIENT
Start: 2024-02-25 | End: 2024-02-25

## 2024-02-25 RX ORDER — ONDANSETRON 4 MG/1
4 TABLET, ORALLY DISINTEGRATING ORAL 3 TIMES DAILY PRN
Qty: 21 TABLET | Refills: 0 | Status: SHIPPED | OUTPATIENT
Start: 2024-02-25

## 2024-02-25 RX ORDER — CAPSAICIN 0.025 %
CREAM (GRAM) TOPICAL ONCE
Status: COMPLETED | OUTPATIENT
Start: 2024-02-25 | End: 2024-02-25

## 2024-02-25 RX ORDER — FAMOTIDINE 20 MG/1
20 TABLET, FILM COATED ORAL 2 TIMES DAILY
Qty: 60 TABLET | Refills: 0 | Status: SHIPPED | OUTPATIENT
Start: 2024-02-25

## 2024-02-25 RX ORDER — ACETAMINOPHEN 500 MG
1000 TABLET ORAL EVERY 6 HOURS PRN
Qty: 60 TABLET | Refills: 0 | Status: SHIPPED | OUTPATIENT
Start: 2024-02-25

## 2024-02-25 RX ORDER — 0.9 % SODIUM CHLORIDE 0.9 %
1000 INTRAVENOUS SOLUTION INTRAVENOUS ONCE
Status: COMPLETED | OUTPATIENT
Start: 2024-02-25 | End: 2024-02-25

## 2024-02-25 RX ORDER — KETOROLAC TROMETHAMINE 15 MG/ML
15 INJECTION, SOLUTION INTRAMUSCULAR; INTRAVENOUS ONCE
Status: COMPLETED | OUTPATIENT
Start: 2024-02-25 | End: 2024-02-25

## 2024-02-25 RX ORDER — HALOPERIDOL 5 MG/ML
5 INJECTION INTRAMUSCULAR ONCE
Status: COMPLETED | OUTPATIENT
Start: 2024-02-25 | End: 2024-02-25

## 2024-02-25 RX ADMIN — DIPHENHYDRAMINE HYDROCHLORIDE 25 MG: 50 INJECTION INTRAMUSCULAR; INTRAVENOUS at 11:15

## 2024-02-25 RX ADMIN — KETOROLAC TROMETHAMINE 15 MG: 15 INJECTION, SOLUTION INTRAMUSCULAR; INTRAVENOUS at 14:22

## 2024-02-25 RX ADMIN — SODIUM CHLORIDE 1000 ML: 9 INJECTION, SOLUTION INTRAVENOUS at 14:26

## 2024-02-25 RX ADMIN — MAGNESIUM SULFATE HEPTAHYDRATE 2000 MG: 40 INJECTION, SOLUTION INTRAVENOUS at 11:13

## 2024-02-25 RX ADMIN — HALOPERIDOL LACTATE 5 MG: 5 INJECTION, SOLUTION INTRAMUSCULAR at 11:16

## 2024-02-25 RX ADMIN — SODIUM CHLORIDE 2000 ML: 9 INJECTION, SOLUTION INTRAVENOUS at 11:13

## 2024-02-25 RX ADMIN — HALOPERIDOL LACTATE 5 MG: 5 INJECTION, SOLUTION INTRAMUSCULAR at 12:09

## 2024-02-25 RX ADMIN — CAPSAICIN: 0.25 CREAM TOPICAL at 14:22

## 2024-02-25 RX ADMIN — FAMOTIDINE 20 MG: 10 INJECTION, SOLUTION INTRAVENOUS at 11:19

## 2024-02-25 ASSESSMENT — PAIN SCALES - GENERAL
PAINLEVEL_OUTOF10: 10

## 2024-02-25 ASSESSMENT — PAIN DESCRIPTION - PAIN TYPE: TYPE: CHRONIC PAIN

## 2024-02-25 ASSESSMENT — PAIN DESCRIPTION - LOCATION
LOCATION: ABDOMEN

## 2024-02-25 ASSESSMENT — PAIN DESCRIPTION - FREQUENCY: FREQUENCY: CONTINUOUS

## 2024-02-25 ASSESSMENT — PAIN DESCRIPTION - DESCRIPTORS
DESCRIPTORS: ACHING
DESCRIPTORS: ACHING

## 2024-02-25 ASSESSMENT — PAIN - FUNCTIONAL ASSESSMENT
PAIN_FUNCTIONAL_ASSESSMENT: 0-10
PAIN_FUNCTIONAL_ASSESSMENT: 0-10

## 2024-02-25 ASSESSMENT — PAIN DESCRIPTION - ONSET: ONSET: ON-GOING

## 2024-02-25 NOTE — ED NOTES
When giving the pt the Haldol ordered he stated, \"That's not going to help, they always treat me with pain medication when I come here.\"  The pt was advised we don't treat emesis with pain medication.

## 2024-02-25 NOTE — ED NOTES
The pt is continually dry heaving and being very loud when doing it, and he yells out that he's in pain.

## 2024-02-25 NOTE — ED PROVIDER NOTES
[facility] ED  eMERGENCY dEPARTMENT eNCOUnter      Pt Name: Sang Escalona  MRN: [id number]  Birthdate 1975  Date of evaluation: 2/25/2024  Provider: Gunner Peguero MD  Note Started: 2/25/24 10:59 AM EST    HISTORY OF PRESENT ILLNESS      Chief Complaint   Patient presents with    Abdominal Pain       The history is provided by the Patient and EMS.  Sang Escalona is a 49 y.o. male with a PMH clinically significant for HTN, DM II, Pancreatitis, Cannabinoid Hyperemesis Syndrome, Anxiety, MDD, Tobacco Smoking, Etoh Abuse, and THC abuse presenting to the ED via EMS c/o diffuse abdominal pain worst in the epigastrium associated with nausea, multiple episodes of nonbloody nonbilious emesis and inability to tolerate p.o. intake.  Patient stating symptoms started the night after he was discharged from the hospital.  Have been ongoing since then.  Was taking hot showers with some relief, but then showers stopped helping as much.  So came to the ED.  Denies any radiation of the pain.  Denies any associated: Fevers, Chills, HA, Numbness, Focal Weakness, Cough, Hemoptysis, SOB, CP, Diarrhea, Constipation, Hematemesis, Dysuria, Hematuria, or Difficulty urinating.  Precipitating Factors: None.   States he has not smoked marijuana for the past month.  Denies EtOH use recently. No hx of surgeries on the abd.  States history of similar previous episodes.  States they have otherwise been feeling well.    EMS reporting patient mildly tachycardic with max HR of 110. VSS otherwise stable.    Per Chart Review: PMH as noted above obtained via outpatient chart review.   Recent admission to the hospital from 2/19 - 2/22 appreciated.  Admitted for JANETH, poorly controlled diabetes and cyclical vomiting.  JANETH thought secondary to dehydration.  Not discharged on insulin, but was evaluated by endocrinology.  Noting history of severe alcohol use disorder, but in remission with last use 1 month prior to admission.  CT abdomen and

## 2024-02-26 LAB
EKG ATRIAL RATE: 108 BPM
EKG P AXIS: 70 DEGREES
EKG P-R INTERVAL: 144 MS
EKG Q-T INTERVAL: 350 MS
EKG QRS DURATION: 84 MS
EKG QTC CALCULATION (BAZETT): 469 MS
EKG R AXIS: 4 DEGREES
EKG T AXIS: 67 DEGREES
EKG VENTRICULAR RATE: 108 BPM

## 2024-02-26 NOTE — ED NOTES
Patient discharged by other RN prior to writers shift. Patient being taken out of charting system at this time by writer.

## 2024-03-05 NOTE — FLOWSHEET NOTE
Pt co pain in mouth when eating and frothy feeling, thrush noted when assessing.  Hospitalist notified Statement Selected